# Patient Record
Sex: FEMALE | Race: OTHER | NOT HISPANIC OR LATINO | Employment: UNEMPLOYED | URBAN - METROPOLITAN AREA
[De-identification: names, ages, dates, MRNs, and addresses within clinical notes are randomized per-mention and may not be internally consistent; named-entity substitution may affect disease eponyms.]

---

## 2017-03-31 ENCOUNTER — HISTORICAL (OUTPATIENT)
Dept: INTERNAL MEDICINE | Facility: CLINIC | Age: 72
End: 2017-03-31

## 2017-04-18 ENCOUNTER — HISTORICAL (OUTPATIENT)
Dept: INTERNAL MEDICINE | Facility: CLINIC | Age: 72
End: 2017-04-18

## 2018-05-01 ENCOUNTER — HISTORICAL (OUTPATIENT)
Dept: INTERNAL MEDICINE | Facility: CLINIC | Age: 73
End: 2018-05-01

## 2018-09-27 ENCOUNTER — HISTORICAL (OUTPATIENT)
Dept: ADMINISTRATIVE | Facility: HOSPITAL | Age: 73
End: 2018-09-27

## 2019-06-28 ENCOUNTER — HISTORICAL (OUTPATIENT)
Dept: INTERNAL MEDICINE | Facility: CLINIC | Age: 74
End: 2019-06-28

## 2019-06-28 LAB
ABS NEUT (OLG): 5.13 X10(3)/MCL (ref 2.1–9.2)
ALBUMIN SERPL-MCNC: 3.7 GM/DL (ref 3.4–5)
ALBUMIN/GLOB SERPL: 1.1 RATIO (ref 1.1–2)
ALP SERPL-CCNC: 109 UNIT/L (ref 45–117)
ALT SERPL-CCNC: 16 UNIT/L (ref 12–78)
APPEARANCE, UA: CLEAR
AST SERPL-CCNC: 16 UNIT/L (ref 15–37)
BACTERIA #/AREA URNS AUTO: ABNORMAL /[HPF]
BASOPHILS # BLD AUTO: 0.06 X10(3)/MCL
BASOPHILS NFR BLD AUTO: 1 %
BILIRUB SERPL-MCNC: 0.7 MG/DL (ref 0.2–1)
BILIRUB UR QL STRIP: NEGATIVE
BILIRUBIN DIRECT+TOT PNL SERPL-MCNC: 0.2 MG/DL
BILIRUBIN DIRECT+TOT PNL SERPL-MCNC: 0.5 MG/DL
BUN SERPL-MCNC: 8 MG/DL (ref 7–18)
CALCIUM SERPL-MCNC: 9 MG/DL (ref 8.5–10.1)
CHLORIDE SERPL-SCNC: 98 MMOL/L (ref 98–107)
CHOLEST SERPL-MCNC: 161 MG/DL
CHOLEST/HDLC SERPL: 2.5 {RATIO} (ref 0–4.4)
CO2 SERPL-SCNC: 32 MMOL/L (ref 21–32)
COLOR UR: ABNORMAL
CREAT SERPL-MCNC: 0.7 MG/DL (ref 0.6–1.3)
EOSINOPHIL # BLD AUTO: 0.2 X10(3)/MCL
EOSINOPHIL NFR BLD AUTO: 2 %
ERYTHROCYTE [DISTWIDTH] IN BLOOD BY AUTOMATED COUNT: 12.2 % (ref 11.5–14.5)
EST. AVERAGE GLUCOSE BLD GHB EST-MCNC: 128 MG/DL
GLOBULIN SER-MCNC: 3.5 GM/ML (ref 2.3–3.5)
GLUCOSE (UA): NORMAL
GLUCOSE SERPL-MCNC: 107 MG/DL (ref 74–106)
HBA1C MFR BLD: 6.1 % (ref 4.2–6.3)
HCT VFR BLD AUTO: 42.1 % (ref 35–46)
HDLC SERPL-MCNC: 64 MG/DL
HGB BLD-MCNC: 14.1 GM/DL (ref 12–16)
HGB UR QL STRIP: NEGATIVE
HYALINE CASTS #/AREA URNS LPF: ABNORMAL /[LPF]
IMM GRANULOCYTES # BLD AUTO: 0.02 10*3/UL
IMM GRANULOCYTES NFR BLD AUTO: 0 %
KETONES UR QL STRIP: NEGATIVE
LDLC SERPL CALC-MCNC: 75 MG/DL (ref 0–130)
LEUKOCYTE ESTERASE UR QL STRIP: 500 LEU/UL
LYMPHOCYTES # BLD AUTO: 2.04 X10(3)/MCL
LYMPHOCYTES NFR BLD AUTO: 25 % (ref 13–40)
MCH RBC QN AUTO: 30.7 PG (ref 26–34)
MCHC RBC AUTO-ENTMCNC: 33.5 GM/DL (ref 31–37)
MCV RBC AUTO: 91.7 FL (ref 80–100)
MONOCYTES # BLD AUTO: 0.72 X10(3)/MCL
MONOCYTES NFR BLD AUTO: 9 % (ref 4–12)
NEUTROPHILS # BLD AUTO: 5.13 X10(3)/MCL
NEUTROPHILS NFR BLD AUTO: 63 X10(3)/MCL
NITRITE UR QL STRIP: NEGATIVE
PH UR STRIP: 7 [PH] (ref 4.5–8)
PLATELET # BLD AUTO: 343 X10(3)/MCL (ref 130–400)
PMV BLD AUTO: 9.2 FL (ref 7.4–10.4)
POTASSIUM SERPL-SCNC: 3.6 MMOL/L (ref 3.5–5.1)
PROT SERPL-MCNC: 7.2 GM/DL (ref 6.4–8.2)
PROT UR QL STRIP: NEGATIVE
RBC # BLD AUTO: 4.59 X10(6)/MCL (ref 4–5.2)
RBC #/AREA URNS AUTO: ABNORMAL /[HPF]
SODIUM SERPL-SCNC: 133 MMOL/L (ref 136–145)
SP GR UR STRIP: 1.01 (ref 1–1.03)
SQUAMOUS #/AREA URNS LPF: >100 /[LPF]
TRIGL SERPL-MCNC: 109 MG/DL
TSH SERPL-ACNC: 1.37 MIU/L (ref 0.36–3.74)
UROBILINOGEN UR STRIP-ACNC: NORMAL
VLDLC SERPL CALC-MCNC: 22 MG/DL
WBC # SPEC AUTO: 8.2 X10(3)/MCL (ref 4.5–11)
WBC #/AREA URNS AUTO: ABNORMAL /HPF

## 2019-07-23 ENCOUNTER — HISTORICAL (OUTPATIENT)
Dept: RADIOLOGY | Facility: HOSPITAL | Age: 74
End: 2019-07-23

## 2019-09-30 LAB
BILIRUB SERPL-MCNC: NEGATIVE MG/DL
BLOOD URINE, POC: NORMAL
CLARITY, POC UA: CLEAR
COLOR, POC UA: YELLOW
GLUCOSE UR QL STRIP: NEGATIVE
KETONES UR QL STRIP: NEGATIVE
LEUKOCYTE EST, POC UA: NEGATIVE
NITRITE, POC UA: NEGATIVE
PH, POC UA: 7
PROTEIN, POC: NORMAL
SPECIFIC GRAVITY, POC UA: 1
UROBILINOGEN, POC UA: NORMAL

## 2020-05-28 ENCOUNTER — HISTORICAL (OUTPATIENT)
Dept: LAB | Facility: HOSPITAL | Age: 75
End: 2020-05-28

## 2020-05-28 LAB
ABS NEUT (OLG): 3.9 X10(3)/MCL (ref 2.1–9.2)
APPEARANCE, UA: CLEAR
BACTERIA #/AREA URNS AUTO: ABNORMAL /HPF
BASOPHILS # BLD AUTO: 0.1 X10(3)/MCL (ref 0–0.2)
BASOPHILS NFR BLD AUTO: 1 %
BILIRUB UR QL STRIP: NEGATIVE
BUN SERPL-MCNC: 12 MG/DL (ref 7–18)
CALCIUM SERPL-MCNC: 9.1 MG/DL (ref 8.5–10.1)
CHLORIDE SERPL-SCNC: 98 MMOL/L (ref 98–107)
CHOLEST SERPL-MCNC: 174 MG/DL
CHOLEST/HDLC SERPL: 2.7 {RATIO} (ref 0–4.4)
CO2 SERPL-SCNC: 31 MMOL/L (ref 21–32)
COLOR UR: NORMAL
CREAT SERPL-MCNC: 0.8 MG/DL (ref 0.6–1.3)
CREAT/UREA NIT SERPL: 15
EOSINOPHIL # BLD AUTO: 0.3 X10(3)/MCL (ref 0–0.9)
EOSINOPHIL NFR BLD AUTO: 5 %
ERYTHROCYTE [DISTWIDTH] IN BLOOD BY AUTOMATED COUNT: 12.1 % (ref 11.5–14.5)
EST. AVERAGE GLUCOSE BLD GHB EST-MCNC: 108 MG/DL
GLUCOSE (UA): NEGATIVE
GLUCOSE SERPL-MCNC: 99 MG/DL (ref 74–106)
HBA1C MFR BLD: 5.4 % (ref 4.2–6.3)
HCT VFR BLD AUTO: 41 % (ref 35–46)
HDLC SERPL-MCNC: 64 MG/DL (ref 40–59)
HGB BLD-MCNC: 13.9 GM/DL (ref 12–16)
HGB UR QL STRIP: NEGATIVE
HYALINE CASTS #/AREA URNS LPF: ABNORMAL /LPF
IMM GRANULOCYTES # BLD AUTO: 0.03 10*3/UL
IMM GRANULOCYTES NFR BLD AUTO: 0 %
KETONES UR QL STRIP: NEGATIVE
LDLC SERPL CALC-MCNC: 95 MG/DL
LEUKOCYTE ESTERASE UR QL STRIP: NEGATIVE
LYMPHOCYTES # BLD AUTO: 2.2 X10(3)/MCL (ref 0.6–4.6)
LYMPHOCYTES NFR BLD AUTO: 31 %
MCH RBC QN AUTO: 30.5 PG (ref 26–34)
MCHC RBC AUTO-ENTMCNC: 33.9 GM/DL (ref 31–37)
MCV RBC AUTO: 89.9 FL (ref 80–100)
MONOCYTES # BLD AUTO: 0.7 X10(3)/MCL (ref 0.1–1.3)
MONOCYTES NFR BLD AUTO: 10 %
MUCOUS THREADS URNS QL MICRO: SLIGHT
NEUTROPHILS # BLD AUTO: 3.9 X10(3)/MCL (ref 2.1–9.2)
NEUTROPHILS NFR BLD AUTO: 54 %
NITRITE UR QL STRIP: NEGATIVE
PH UR STRIP: 7.5 [PH] (ref 4.5–8)
PLATELET # BLD AUTO: 324 X10(3)/MCL (ref 130–400)
PMV BLD AUTO: 9 FL (ref 7.4–10.4)
POTASSIUM SERPL-SCNC: 3.5 MMOL/L (ref 3.5–5.1)
PROT UR QL STRIP: NEGATIVE
RBC # BLD AUTO: 4.56 X10(6)/MCL (ref 4–5.2)
RBC #/AREA URNS AUTO: ABNORMAL /HPF
SODIUM SERPL-SCNC: 133 MMOL/L (ref 136–145)
SP GR UR STRIP: 1 (ref 1–1.03)
SQUAMOUS #/AREA URNS LPF: ABNORMAL /LPF
TRIGL SERPL-MCNC: 77 MG/DL
TSH SERPL-ACNC: 2.16 MIU/L (ref 0.36–3.74)
UROBILINOGEN UR STRIP-ACNC: NORMAL
VLDLC SERPL CALC-MCNC: 15 MG/DL
WBC # SPEC AUTO: 7.3 X10(3)/MCL (ref 4.5–11)
WBC #/AREA URNS AUTO: ABNORMAL /HPF

## 2020-07-27 ENCOUNTER — HISTORICAL (OUTPATIENT)
Dept: RADIOLOGY | Facility: HOSPITAL | Age: 75
End: 2020-07-27

## 2020-09-01 ENCOUNTER — HISTORICAL (OUTPATIENT)
Dept: INTERNAL MEDICINE | Facility: CLINIC | Age: 75
End: 2020-09-01

## 2020-09-01 LAB
ALBUMIN SERPL-MCNC: 3.6 GM/DL (ref 3.4–5)
ALBUMIN/GLOB SERPL: 1 RATIO (ref 1.1–2)
ALP SERPL-CCNC: 90 UNIT/L (ref 45–117)
ALT SERPL-CCNC: 17 UNIT/L (ref 12–78)
AST SERPL-CCNC: 20 UNIT/L (ref 15–37)
BILIRUB SERPL-MCNC: 0.4 MG/DL (ref 0.2–1)
BILIRUBIN DIRECT+TOT PNL SERPL-MCNC: 0.1 MG/DL (ref 0–0.2)
BILIRUBIN DIRECT+TOT PNL SERPL-MCNC: 0.3 MG/DL
BUN SERPL-MCNC: 12 MG/DL (ref 7–18)
CALCIUM SERPL-MCNC: 9 MG/DL (ref 8.5–10.1)
CHLORIDE SERPL-SCNC: 100 MMOL/L (ref 98–107)
CO2 SERPL-SCNC: 30 MMOL/L (ref 21–32)
CREAT SERPL-MCNC: 0.7 MG/DL (ref 0.6–1.3)
GLOBULIN SER-MCNC: 3.7 GM/ML (ref 2.3–3.5)
GLUCOSE SERPL-MCNC: 95 MG/DL (ref 74–106)
POTASSIUM SERPL-SCNC: 4.2 MMOL/L (ref 3.5–5.1)
PROT SERPL-MCNC: 7.3 GM/DL (ref 6.4–8.2)
SODIUM SERPL-SCNC: 136 MMOL/L (ref 136–145)

## 2020-09-24 ENCOUNTER — HISTORICAL (OUTPATIENT)
Dept: SURGERY | Facility: HOSPITAL | Age: 75
End: 2020-09-24

## 2020-12-22 ENCOUNTER — HISTORICAL (OUTPATIENT)
Dept: INTERNAL MEDICINE | Facility: CLINIC | Age: 75
End: 2020-12-22

## 2020-12-22 LAB
ABS NEUT (OLG): 2.95 X10(3)/MCL (ref 2.1–9.2)
ALBUMIN SERPL-MCNC: 4 GM/DL (ref 3.4–4.8)
ALBUMIN/GLOB SERPL: 1.2 RATIO (ref 1.1–2)
ALP SERPL-CCNC: 95 UNIT/L (ref 40–150)
ALT SERPL-CCNC: 14 UNIT/L (ref 0–55)
APPEARANCE, UA: ABNORMAL
AST SERPL-CCNC: 23 UNIT/L (ref 5–34)
BACTERIA #/AREA URNS AUTO: ABNORMAL /HPF
BASOPHILS # BLD AUTO: 0 X10(3)/MCL (ref 0–0.2)
BASOPHILS NFR BLD AUTO: 1 %
BILIRUB SERPL-MCNC: 0.6 MG/DL
BILIRUB UR QL STRIP: NEGATIVE
BILIRUBIN DIRECT+TOT PNL SERPL-MCNC: 0.3 MG/DL (ref 0–0.5)
BILIRUBIN DIRECT+TOT PNL SERPL-MCNC: 0.3 MG/DL (ref 0–0.8)
BUN SERPL-MCNC: 10 MG/DL (ref 9.8–20.1)
CALCIUM SERPL-MCNC: 9.2 MG/DL (ref 8.4–10.2)
CHLORIDE SERPL-SCNC: 96 MMOL/L (ref 98–107)
CHOLEST SERPL-MCNC: 180 MG/DL
CHOLEST/HDLC SERPL: 3 {RATIO} (ref 0–5)
CO2 SERPL-SCNC: 29 MMOL/L (ref 23–31)
COLOR UR: YELLOW
CREAT SERPL-MCNC: 0.73 MG/DL (ref 0.55–1.02)
EOSINOPHIL # BLD AUTO: 0.1 X10(3)/MCL (ref 0–0.9)
EOSINOPHIL NFR BLD AUTO: 2 %
ERYTHROCYTE [DISTWIDTH] IN BLOOD BY AUTOMATED COUNT: 12.1 % (ref 11.5–14.5)
EST. AVERAGE GLUCOSE BLD GHB EST-MCNC: 116.9 MG/DL
GLOBULIN SER-MCNC: 3.2 GM/DL (ref 2.4–3.5)
GLUCOSE (UA): NEGATIVE
GLUCOSE SERPL-MCNC: 140 MG/DL (ref 82–115)
HBA1C MFR BLD: 5.7 %
HCT VFR BLD AUTO: 40.2 % (ref 35–46)
HDLC SERPL-MCNC: 70 MG/DL (ref 35–60)
HGB BLD-MCNC: 13.2 GM/DL (ref 12–16)
HGB UR QL STRIP: NEGATIVE
HYALINE CASTS #/AREA URNS LPF: ABNORMAL /LPF
IMM GRANULOCYTES # BLD AUTO: 0.02 10*3/UL
IMM GRANULOCYTES NFR BLD AUTO: 0 %
KETONES UR QL STRIP: NEGATIVE
LDLC SERPL CALC-MCNC: 96 MG/DL (ref 50–140)
LEUKOCYTE ESTERASE UR QL STRIP: 25 LEU/UL
LYMPHOCYTES # BLD AUTO: 1.2 X10(3)/MCL (ref 0.6–4.6)
LYMPHOCYTES NFR BLD AUTO: 25 %
MCH RBC QN AUTO: 30.5 PG (ref 26–34)
MCHC RBC AUTO-ENTMCNC: 32.8 GM/DL (ref 31–37)
MCV RBC AUTO: 92.8 FL (ref 80–100)
MONOCYTES # BLD AUTO: 0.6 X10(3)/MCL (ref 0.1–1.3)
MONOCYTES NFR BLD AUTO: 12 %
NEUTROPHILS # BLD AUTO: 2.95 X10(3)/MCL (ref 2.1–9.2)
NEUTROPHILS NFR BLD AUTO: 59 %
NITRITE UR QL STRIP: NEGATIVE
PH UR STRIP: 6.5 [PH] (ref 4.5–8)
PLATELET # BLD AUTO: 338 X10(3)/MCL (ref 130–400)
PMV BLD AUTO: 9.4 FL (ref 7.4–10.4)
POTASSIUM SERPL-SCNC: 4.2 MMOL/L (ref 3.5–5.1)
PROT SERPL-MCNC: 7.2 GM/DL (ref 5.8–7.6)
PROT UR QL STRIP: NEGATIVE
RBC # BLD AUTO: 4.33 X10(6)/MCL (ref 4–5.2)
RBC #/AREA URNS AUTO: ABNORMAL /HPF
SODIUM SERPL-SCNC: 134 MMOL/L (ref 136–145)
SP GR UR STRIP: 1.01 (ref 1–1.03)
SQUAMOUS #/AREA URNS LPF: >100 /LPF
TRIGL SERPL-MCNC: 72 MG/DL (ref 37–140)
UROBILINOGEN UR STRIP-ACNC: NORMAL
VLDLC SERPL CALC-MCNC: 14 MG/DL
WBC # SPEC AUTO: 5 X10(3)/MCL (ref 4.5–11)
WBC #/AREA URNS AUTO: ABNORMAL /HPF

## 2020-12-24 LAB — FINAL CULTURE: NORMAL

## 2021-02-02 ENCOUNTER — HISTORICAL (OUTPATIENT)
Dept: ADMINISTRATIVE | Facility: HOSPITAL | Age: 76
End: 2021-02-02

## 2021-07-20 ENCOUNTER — HISTORICAL (OUTPATIENT)
Dept: INTERNAL MEDICINE | Facility: CLINIC | Age: 76
End: 2021-07-20

## 2021-07-20 LAB
ABS NEUT (OLG): 4.63 X10(3)/MCL (ref 2.1–9.2)
ALBUMIN SERPL-MCNC: 4 GM/DL (ref 3.4–4.8)
ALBUMIN/GLOB SERPL: 1.4 RATIO (ref 1.1–2)
ALP SERPL-CCNC: 78 UNIT/L (ref 40–150)
ALT SERPL-CCNC: 11 UNIT/L (ref 0–55)
AST SERPL-CCNC: 17 UNIT/L (ref 5–34)
BASOPHILS # BLD AUTO: 0 X10(3)/MCL (ref 0–0.2)
BASOPHILS NFR BLD AUTO: 0 %
BILIRUB SERPL-MCNC: 0.8 MG/DL
BILIRUBIN DIRECT+TOT PNL SERPL-MCNC: 0.3 MG/DL (ref 0–0.5)
BILIRUBIN DIRECT+TOT PNL SERPL-MCNC: 0.5 MG/DL (ref 0–0.8)
BUN SERPL-MCNC: 13.6 MG/DL (ref 9.8–20.1)
CALCIUM SERPL-MCNC: 9.7 MG/DL (ref 8.4–10.2)
CHLORIDE SERPL-SCNC: 99 MMOL/L (ref 98–107)
CHOLEST SERPL-MCNC: 183 MG/DL
CHOLEST/HDLC SERPL: 3 {RATIO} (ref 0–5)
CO2 SERPL-SCNC: 32 MMOL/L (ref 23–31)
CREAT SERPL-MCNC: 0.83 MG/DL (ref 0.55–1.02)
EOSINOPHIL # BLD AUTO: 0.2 X10(3)/MCL (ref 0–0.9)
EOSINOPHIL NFR BLD AUTO: 3 %
ERYTHROCYTE [DISTWIDTH] IN BLOOD BY AUTOMATED COUNT: 12.4 % (ref 11.5–14.5)
EST. AVERAGE GLUCOSE BLD GHB EST-MCNC: 111.2 MG/DL
GLOBULIN SER-MCNC: 2.9 GM/DL (ref 2.4–3.5)
GLUCOSE SERPL-MCNC: 101 MG/DL (ref 82–115)
HBA1C MFR BLD: 5.5 %
HCT VFR BLD AUTO: 41.6 % (ref 35–46)
HDLC SERPL-MCNC: 55 MG/DL (ref 35–60)
HGB BLD-MCNC: 13.9 GM/DL (ref 12–16)
IMM GRANULOCYTES # BLD AUTO: 0.02 10*3/UL
IMM GRANULOCYTES NFR BLD AUTO: 0 %
LDLC SERPL CALC-MCNC: 112 MG/DL (ref 50–140)
LYMPHOCYTES # BLD AUTO: 2 X10(3)/MCL (ref 0.6–4.6)
LYMPHOCYTES NFR BLD AUTO: 27 %
MCH RBC QN AUTO: 30.7 PG (ref 26–34)
MCHC RBC AUTO-ENTMCNC: 33.4 GM/DL (ref 31–37)
MCV RBC AUTO: 91.8 FL (ref 80–100)
MONOCYTES # BLD AUTO: 0.5 X10(3)/MCL (ref 0.1–1.3)
MONOCYTES NFR BLD AUTO: 7 %
NEUTROPHILS # BLD AUTO: 4.63 X10(3)/MCL (ref 2.1–9.2)
NEUTROPHILS NFR BLD AUTO: 62 %
NRBC BLD AUTO-RTO: 0 % (ref 0–0.2)
PLATELET # BLD AUTO: 292 X10(3)/MCL (ref 130–400)
PMV BLD AUTO: 10 FL (ref 7.4–10.4)
POTASSIUM SERPL-SCNC: 3.9 MMOL/L (ref 3.5–5.1)
PROT SERPL-MCNC: 6.9 GM/DL (ref 5.8–7.6)
RBC # BLD AUTO: 4.53 X10(6)/MCL (ref 4–5.2)
SODIUM SERPL-SCNC: 136 MMOL/L (ref 136–145)
TRIGL SERPL-MCNC: 79 MG/DL (ref 37–140)
TSH SERPL-ACNC: 1.44 UIU/ML (ref 0.35–4.94)
VLDLC SERPL CALC-MCNC: 16 MG/DL
WBC # SPEC AUTO: 7.4 X10(3)/MCL (ref 4.5–11)

## 2021-11-22 ENCOUNTER — HISTORICAL (OUTPATIENT)
Dept: INTERNAL MEDICINE | Facility: CLINIC | Age: 76
End: 2021-11-22

## 2021-11-22 LAB
DEPRECATED CALCIDIOL+CALCIFEROL SERPL-MC: 47.1 NG/ML (ref 30–80)
VIT B12 SERPL-MCNC: 852 PG/ML (ref 213–816)

## 2021-11-29 ENCOUNTER — HISTORICAL (OUTPATIENT)
Dept: ADMINISTRATIVE | Facility: HOSPITAL | Age: 76
End: 2021-11-29

## 2021-11-29 LAB
ABS NEUT (OLG): 6.65 X10(3)/MCL (ref 2.1–9.2)
ALBUMIN SERPL-MCNC: 4.4 GM/DL (ref 3.4–4.8)
ALBUMIN/GLOB SERPL: 1.2 RATIO (ref 1.1–2)
ALP SERPL-CCNC: 86 UNIT/L (ref 40–150)
ALT SERPL-CCNC: 15 UNIT/L (ref 0–55)
APTT PPP: 31 SECOND(S) (ref 23.3–37)
AST SERPL-CCNC: 29 UNIT/L (ref 5–34)
BASOPHILS # BLD AUTO: 0.1 X10(3)/MCL (ref 0–0.2)
BASOPHILS NFR BLD AUTO: 1 %
BILIRUB SERPL-MCNC: 0.7 MG/DL
BILIRUBIN DIRECT+TOT PNL SERPL-MCNC: 0.2 MG/DL (ref 0–0.5)
BILIRUBIN DIRECT+TOT PNL SERPL-MCNC: 0.5 MG/DL (ref 0–0.8)
BUN SERPL-MCNC: 13.8 MG/DL (ref 9.8–20.1)
CALCIUM SERPL-MCNC: 10.4 MG/DL (ref 8.7–10.5)
CHLORIDE SERPL-SCNC: 94 MMOL/L (ref 98–107)
CO2 SERPL-SCNC: 26 MMOL/L (ref 23–31)
CREAT SERPL-MCNC: 0.68 MG/DL (ref 0.55–1.02)
EOSINOPHIL # BLD AUTO: 0.1 X10(3)/MCL (ref 0–0.9)
EOSINOPHIL NFR BLD AUTO: 1 %
ERYTHROCYTE [DISTWIDTH] IN BLOOD BY AUTOMATED COUNT: 12.2 % (ref 11.5–14.5)
GLOBULIN SER-MCNC: 3.7 GM/DL (ref 2.4–3.5)
GLUCOSE SERPL-MCNC: 88 MG/DL (ref 82–115)
HCT VFR BLD AUTO: 43.5 % (ref 35–46)
HGB BLD-MCNC: 14.6 GM/DL (ref 12–16)
IMM GRANULOCYTES # BLD AUTO: 0.03 10*3/UL
IMM GRANULOCYTES NFR BLD AUTO: 0 %
INR PPP: 0.92 (ref 0.9–1.2)
LYMPHOCYTES # BLD AUTO: 2.2 X10(3)/MCL (ref 0.6–4.6)
LYMPHOCYTES NFR BLD AUTO: 23 %
MCH RBC QN AUTO: 30.7 PG (ref 26–34)
MCHC RBC AUTO-ENTMCNC: 33.6 GM/DL (ref 31–37)
MCV RBC AUTO: 91.6 FL (ref 80–100)
MONOCYTES # BLD AUTO: 0.5 X10(3)/MCL (ref 0.1–1.3)
MONOCYTES NFR BLD AUTO: 5 %
NEUTROPHILS # BLD AUTO: 6.65 X10(3)/MCL (ref 2.1–9.2)
NEUTROPHILS NFR BLD AUTO: 70 %
NRBC BLD AUTO-RTO: 0 % (ref 0–0.2)
PLATELET # BLD AUTO: 349 X10(3)/MCL (ref 130–400)
PMV BLD AUTO: 11 FL (ref 7.4–10.4)
POTASSIUM SERPL-SCNC: 4.7 MMOL/L (ref 3.5–5.1)
PROT SERPL-MCNC: 8.1 GM/DL (ref 5.8–7.6)
PROTHROMBIN TIME: 12.2 SECOND(S) (ref 11.9–14.4)
RBC # BLD AUTO: 4.75 X10(6)/MCL (ref 4–5.2)
SODIUM SERPL-SCNC: 131 MMOL/L (ref 136–145)
WBC # SPEC AUTO: 9.5 X10(3)/MCL (ref 4.5–11)

## 2022-02-17 ENCOUNTER — HISTORICAL (OUTPATIENT)
Dept: ADMINISTRATIVE | Facility: HOSPITAL | Age: 77
End: 2022-02-17

## 2022-04-10 ENCOUNTER — HISTORICAL (OUTPATIENT)
Dept: ADMINISTRATIVE | Facility: HOSPITAL | Age: 77
End: 2022-04-10
Payer: MEDICARE

## 2022-04-30 VITALS
OXYGEN SATURATION: 99 % | WEIGHT: 129.44 LBS | SYSTOLIC BLOOD PRESSURE: 140 MMHG | SYSTOLIC BLOOD PRESSURE: 137 MMHG | HEIGHT: 61 IN | DIASTOLIC BLOOD PRESSURE: 87 MMHG | HEIGHT: 63 IN | BODY MASS INDEX: 24.44 KG/M2 | WEIGHT: 136.25 LBS | DIASTOLIC BLOOD PRESSURE: 68 MMHG | BODY MASS INDEX: 24.14 KG/M2

## 2022-04-30 NOTE — H&P
Patient:   Kika Farias             MRN: [id number]            FIN: [id number]               Age:   74 years     Sex:  Female     :  1945   Associated Diagnoses:   None   Author:   Maria E Lu MD      HPI: PT here for cataract surgery of the left eye. Denies changes in health or medications since the patient was last seen in clinic.    ROS: Negative x 10      SLE: Visually significant cataract of the left eye           General NAP  HENT atraumatic  Eyes: cataract OS  Neck: nontender, no masses or thyromegaly  Respiratory: no distress on room air  Cardiovascular: regular rate  Gastrointestinal: no hepatomegaly   Lymphatics: no lymphadenopathy  Musculoskeletal: wnl      Assessment and plan   1.  Visually significant cataract of the left eye   - Covid testing verified: Negative  - Allergies: Verified  - Surgical site marked  -Consent: Verified  - Questions answered  - Ok to proceed with surgery     Maria E Lu MD  [employer] [facility] PGY-4

## 2022-04-30 NOTE — OP NOTE
Patient:   Kika Farias             MRN: [id number]            FIN: [id number]               Age:   74 years     Sex:  Female     :  1945   Associated Diagnoses:   None   Author:   Maria E Lu MD      OPERATIVE NOTE -- OPHTHALMOLOGY SERVICE    DATE: 2020    SURGEON: Maria E Lu MD    ATTENDING: IGNACIA Vargas MD    PRE-OPERATIVE DIAGNOSIS: Visually significant cataract, LEFT eye    POST-OPERATIVE DIAGNOSIS: Visually significant cataract, LEFT eye    PROCEDURES: Phacoemulsification with intraocular lens, LEFT  eye    IMPLANT: [id number] +28.00 SN [id number]    ANESTHESIA: MAC with topical lidocaine    COMPLICATIONS: None    ESTIMATED BLOOD LOSS: < 5 cc    INDICATION:    The patient has a history of painless progressive visual loss and difficulty with activities of daily living secondary to cataract formation. After a thorough discussion of the risks, benefits and alternatives to cataract surgery, including, but not limited to, the rare risks of infection, retinal detachment, need for additional surgery, loss of vision and even loss of the eye, the patient voices good understanding and desires to proceed. Written informed consent was confirmed and witnessed prior to surgery.    PROCEDURE IN DETAIL:    TheThe patients IOL calculations and lens selection were reviewed and confirmed. After verification and marking of the proper eye in the preop holding area, several sets of 1% tropicamide, 2.5% phenylephrine, and 1% cyclopentolate drops were then placed.    The patient was brought to the operating room in supine position where the eye was prepped and draped in standard sterile fashion using 5% betadine. A lid speculum placed. Topical tetracaine and preservative free lidocaine  was used in addition to the preoperative anesthesia. A paracentesis incision was created through which preservative free lidocaine was injected into the eye. At this time, viscoelastic was used to fill the anterior chamber  through the paracentesis wound. A 2.4mm keratome blade was used to create a temporal clear corneal incision. A cystotome and Utrata forceps was then used to fashion a continuous curvilinear capsulorrhexis. Hydrodissection with BSS was performed using a hydrodissection cannula. Phacoemulsification of the nucleus was carried out with a divide and conquer technique, and all remaining epinuclear and cortical material was removed. The eye was reformed using viscoelastic and the above listed intraocular lens was implanted into the capsular bag. At the time of lens insertion a small rent in one of the haptics was observed. The lens was manipulated and set in a central position. All remaining viscoelastic was removed from the eye.  The lens was investigated and both haptics were found to be in position and lens was found to be centered. All wounds were hydrated  watertight at the end of the case.    Drops of Vigamox and Pred Forte.were instilled and an eye shield placed over the eye. The patient tolerated the procedure well and was taken to the recovery area in stable condition. The patient was instructed to follow-up for routine post-operative care the following morning.    The attending surgeon was present, supervising, and assisting as necessary during the entire surgery.

## 2022-05-02 NOTE — HISTORICAL [facility] CERNER
This is a historical note converted from Siria. Formatting and pictures may have been removed.  Please reference Siria for original formatting and attached multimedia. Chief Complaint  Pt. reports same pain in her right arm.  History of Present Illness  72 year old female here today for F/U. pt previous pt D Manuel. Pt has HTN at goal. She reports right arm pain, she states she had a fracture 1 year ago and area still hurts with mild pain.  Review of Systems  All Systems Negative Except: See HPI  Physical Exam  Vitals & Measurements  T:?36.7? ?C (Oral)? HR:?67(Peripheral)? RR:?18? BP:?137/87?  HT:?160?cm? HT:?160?cm? WT:?61.8?kg? WT:?61.8?kg? BMI:?24.14?  General:? Alert and oriented, No acute distress, General health good  Eye:? Pupils are equal, round and reactive to light, Normal conjunctiva.?  HENT:? Normocephalic, Normal hearing, Oral mucosa is moist, No pharyngeal erythema.?  Neck:? Supple, Non-tender, No carotid bruit, No jugular venous distention, No lymphadenopathy, No thyromegaly.?  Respiratory:? Lungs are clear to auscultation, Respirations are non-labored, Breath sounds are equal, Symmetrical chest wall expansion.?  Cardiovascular:? Normal rate, Regular rhythm, No murmur, Good pulses equal in all extremities, Normal peripheral perfusion, No edema.?  Gastrointestinal:? Soft, Non-tender, Non-distended, Normal bowel sounds, No organomegaly.?  Musculoskeletal: Normal range of motion, Normal strength, No tenderness, No deformity, Normal gait.?  Integumentary:? Warm, Dry.?  Neurologic:? Alert, Oriented.?  Psychiatric:? Cooperative, Appropriate mood & affect.?  ?  ?  ?  Assessment/Plan  Hypertension  At? goal  Low sodium diet (Dash Diet)  Continue Medications as prescribed  Monitor Blood daily, report any consistent numbers greater than 140/90  Maintain healthy weight  Ordered:  Internal Referral to Internal Medicine, Refer to [facility] to establish PCP, *Est. 10/27/18 3:00:00 CDT, Future Visit?,  Hypertension  ?  Right arm pain  ?X-ray today  Ordered:  XR Forearm Right 2 Views, Routine, *Est. 09/27/18 3:00:00 CDT, Pain, None, Ambulatory, Rad Type, Order for future visit, Right arm pain, Not Scheduled, *Est. 09/27/18 3:00:00 CDT  ?  Orders:  amLODIPine, 5 mg = 1 tab(s), Oral, Daily, # 90 tab(s), 1 Refill(s), Pharmacy: [id number]  hydrochlorothiazide-losartan, 1 tab(s), Oral, Daily, # 90 tab(s), 1 Refill(s), Pharmacy: [id number]  ?  ?  Pt only speaks Italian enterputer used [id number] [id number]   Problem List/Past Medical History  Ongoing  Alteration in tissue perfusion  HTN (hypertension)  Hyperlipidemia  Hyperlipidemia  Hypertension  Hypertension  Onychomycosis  Osteoarthropathy  Visit for screening mammogram  Well adult exam  Historical  No qualifying data  Procedure/Surgical History  Cataract Extraction Phacoemulsification (Right) (10/13/2015)  Extracapsular cataract removal with insertion of intraocular lens prosthesis (1 stage procedure), manual or mechanical technique (eg, irrigation and aspiration or phacoemulsification) (10/13/2015)  Replacement of Right Lens with Synthetic Substitute, Percutaneous Approach (10/13/2015)  Procedure Cancelled Intraoperatively (Right) (10/08/2015)  Biopsy Gastrointestional (06/18/2014)  Closed [endoscopic] biopsy of large intestine (06/18/2014)  Colonoscopy (06/18/2014)  Colonoscopy, flexible, proximal to splenic flexure; with biopsy, single or multiple. (06/18/2014)  Tubal ligation (1992)   Medications  Artificial Tears, Eye-Both  aspirin 81 mg oral tablet, 81 mg= 1 tab(s), Oral, Daily,? ?Not taking  Calcium 600+D, 2 tab(s), Oral, Daily,? ?Not taking  COLLAGEN FOR HAIR,SKIN & NAILS, See Instructions  glucosamine,? ?Not taking  hydrochlorothiazide-losartan 12.5 mg-100 mg oral tablet, See Instructions, 6 refills  Mobic 7.5 mg oral tablet, 7.5 mg= 1 tab(s), Oral, Daily, PRN,? ?Not taking  Norvasc 5 mg oral tablet, 5 mg= 1 tab(s), Oral, Daily, 3 refills  One  A Day Women 50 Plus oral tablet, See Instructions  Silvadene, 1 sheldon, TOP, Once-NOW  Silvadene 1% topical cream, 1 sheldon, TOP, BID, 1 refills,? ?Not taking  simvastatin 10 mg oral tablet, 10 mg= 1 tab(s), Oral, Once a day (at bedtime), 3 refills  Allergies  No Known Allergies  Social History  Alcohol - Denies Alcohol Use, 06/18/2014  Never, 08/11/2015  Employment/School  Employed, Work/School description: . Activity level: Desk/Office. Operates hazardous equipment: No., 03/31/2016  Exercise  Self assessment: Fair condition., 03/31/2016  Home/Environment  Lives with Spouse. Living situation: Home/Independent. Alcohol abuse in household: No. Substance abuse in household: No. Smoker in household: No. Injuries/Abuse/Neglect in household: No. Feels unsafe at home: No. Family/Friends available for support: Yes. Concern for family members at home: No. Major illness in household: No. Financial concerns: No. TV/Computer concerns: No., 03/31/2016  Nutrition/Health  Regular, Wants to lose weight: No., 03/31/2016  Sexual  Substance Abuse - Denies Substance Abuse, 06/18/2014  Never, 08/11/2015  Tobacco - Denies Tobacco Use, 06/18/2014  Never smoker, N/A, 09/27/2018  Family History  Cardiac arrest.: Father.  Hypertension.: Mother and Father.  Stroke: Mother.  Immunizations  Vaccine Date Status   influenza virus vaccine, inactivated 09/27/2017 Given   Health Maintenance  Health Maintenance  ???Pending?(in the next year)  ??? ??OverDue  ??? ? ? ?Pneumococcal Vaccine due??10/09/15??and every 1??day(s)  ??? ? ? ?Aspirin Therapy for CVD Prevention due??07/27/16??and every 1??year(s)  ??? ? ? ?Geriatric Depression Screening due??09/29/17??and every 1??year(s)  ??? ? ? ?Advance Directive due??03/29/18??and every 1??year(s)  ??? ? ? ?Bone Density Screening due??04/08/18??and every 2??year(s)  ??? ??Due?  ??? ? ? ?ADL Screening due??09/27/18??and every 1??year(s)  ??? ? ? ?Alcohol Misuse Screening due??09/27/18??and  every 1??year(s)  ??? ? ? ?Cognitive Screening due??09/27/18??and every 1??year(s)  ??? ? ? ?Functional Assessment due??09/27/18??and every 1??year(s)  ??? ? ? ?Hypertension Management-Education due??09/27/18??and every 1??year(s)  ??? ? ? ?Tetanus Vaccine due??09/27/18??and every 10??year(s)  ??? ? ? ?Zoster Vaccine due??09/27/18??and every 100??year(s)  ??? ??Due In Future?  ??? ? ? ?Hypertension Management-BMP not due until??05/27/19??and every 1??year(s)  ??? ? ? ?Smoking Cessation not due until??06/26/19??and every 1??year(s)  ???Satisfied?(in the past 1 year)  ??? ??Satisfied?  ??? ? ? ?Blood Pressure Screening on??09/27/18.??Satisfied by Emery Conti LPN F.  ??? ? ? ?Body Mass Index Check on??09/27/18.??Satisfied by Emery Conti LPN F.  ??? ? ? ?Breast Cancer Screening ([facility]) on??05/01/18.??Satisfied by Lashell Love  ??? ? ? ?Depression Screening on??09/27/18.??Satisfied by Emery Conti LPN F.  ??? ? ? ?Diabetes Screening on??05/27/18.??Satisfied by Tegan Farley  ??? ? ? ?Fall Risk Assessment on??09/27/18.??Satisfied by Emery Conti LPN F.  ??? ? ? ?Hypertension Management-Blood Pressure on??09/27/18.??Satisfied by Emery Conti LPN F.  ??? ? ? ?Influenza Vaccine on??09/27/18.??Satisfied by Emery Conti LPN F.  ??? ? ? ?Lipid Screening on??03/27/18.??Satisfied by Julio César Calhounothy Christin  ??? ? ? ?Obesity Screening on??09/27/18.??Satisfied by Emery Conti LPN  ??? ? ? ?Smoking Cessation on??06/26/18.??Satisfied by Eulogio ASHBY, Lea Madrid  ?  ?

## 2022-07-11 ENCOUNTER — OFFICE VISIT (OUTPATIENT)
Dept: GYNECOLOGY | Facility: CLINIC | Age: 77
End: 2022-07-11
Payer: MEDICARE

## 2022-07-11 VITALS
BODY MASS INDEX: 26.23 KG/M2 | WEIGHT: 137.13 LBS | SYSTOLIC BLOOD PRESSURE: 130 MMHG | DIASTOLIC BLOOD PRESSURE: 69 MMHG | RESPIRATION RATE: 20 BRPM | TEMPERATURE: 98 F | HEART RATE: 64 BPM | OXYGEN SATURATION: 100 %

## 2022-07-11 DIAGNOSIS — N81.9 FEMALE GENITAL PROLAPSE, UNSPECIFIED TYPE: Primary | ICD-10-CM

## 2022-07-11 DIAGNOSIS — Z46.89 PESSARY MAINTENANCE: ICD-10-CM

## 2022-07-11 PROCEDURE — 99214 OFFICE O/P EST MOD 30 MIN: CPT | Mod: PBBFAC

## 2022-07-11 RX ORDER — AMLODIPINE BESYLATE 5 MG/1
5 TABLET ORAL DAILY
COMMUNITY
Start: 2022-06-24 | End: 2022-10-21 | Stop reason: SDUPTHER

## 2022-07-11 RX ORDER — ALENDRONATE SODIUM 70 MG/1
70 TABLET ORAL
COMMUNITY
Start: 2022-07-03 | End: 2023-03-21 | Stop reason: SDUPTHER

## 2022-07-11 RX ORDER — CONJUGATED ESTROGENS 0.62 MG/G
1 CREAM VAGINAL DAILY
Qty: 30 G | Refills: 6 | Status: SHIPPED | OUTPATIENT
Start: 2022-07-11 | End: 2023-03-13 | Stop reason: SDUPTHER

## 2022-07-11 RX ORDER — LOSARTAN POTASSIUM 100 MG/1
100 TABLET ORAL DAILY
COMMUNITY
Start: 2022-05-31 | End: 2022-10-26 | Stop reason: SDUPTHER

## 2022-07-11 RX ORDER — CONJUGATED ESTROGENS 0.62 MG/G
CREAM VAGINAL
COMMUNITY
Start: 2022-03-07 | End: 2022-07-11 | Stop reason: SDUPTHER

## 2022-07-11 RX ORDER — HYDROCHLOROTHIAZIDE 12.5 MG/1
12.5 TABLET ORAL DAILY
COMMUNITY
Start: 2022-05-31 | End: 2022-10-26 | Stop reason: SDUPTHER

## 2022-07-11 RX ORDER — OMEPRAZOLE 20 MG/1
20 CAPSULE, DELAYED RELEASE ORAL DAILY
COMMUNITY
Start: 2022-05-31 | End: 2023-03-01

## 2022-07-11 RX ORDER — IBUPROFEN 800 MG/1
800 TABLET ORAL EVERY 8 HOURS
COMMUNITY
Start: 2022-02-17

## 2022-07-11 RX ORDER — SIMVASTATIN 10 MG/1
10 TABLET, FILM COATED ORAL NIGHTLY
COMMUNITY
Start: 2022-04-18 | End: 2022-10-21 | Stop reason: SDUPTHER

## 2022-07-11 NOTE — PROGRESS NOTES
[facility] GYNECOLOGY CLINIC NOTE     Kika Farias is a 76 y.o.  with Stage III Ba prolapse managed with a #2 3/4 Gellhorn pessary. Patient is satisfied with the pessary and having no issues. She voids comfortable at home, denies incomplete emptying or symptoms of bladder outlet obstruction. Denies JAYSON. Denies vaginal bleeding, discharge, foul odor. She is compliant with Vaginal estrogen.    Gynecology  OB History        4    Para   3    Term   3            AB   1    Living   3       SAB        IAB        Ectopic        Multiple        Live Births                    Past Medical History:   Diagnosis Date    Hyperlipidemia     Hypertension       History reviewed. No pertinent surgical history.   Current Outpatient Medications   Medication Instructions    alendronate (FOSAMAX) 70 mg, Oral, Every 7 days    amLODIPine (NORVASC) 5 mg, Oral, Daily    hydroCHLOROthiazide (HYDRODIURIL) 12.5 mg, Oral, Daily    ibuprofen (ADVIL,MOTRIN) 800 mg, Oral, Every 8 hours    losartan (COZAAR) 100 mg, Oral, Daily    omeprazole (PRILOSEC) 20 mg, Oral, Daily    PREMARIN 1 g, Vaginal, Daily    simvastatin (ZOCOR) 10 mg, Oral, Nightly     Social History     Tobacco Use    Smoking status: Never Smoker    Smokeless tobacco: Never Used   Substance Use Topics    Alcohol use: Not Currently    Drug use: Never       Review of Systems  Pertinent items are noted in HPI.     Objective:     /69 (BP Location: Right arm, Patient Position: Sitting, BP Method: Medium (Automatic))   Pulse 64   Temp 98.4 °F (36.9 °C) (Oral)   Resp 20   Wt 62.2 kg (137 lb 2 oz)   SpO2 100%   BMI 26.23 kg/m²   Physical Exam:  Gen: Well-nourished, well-developed female appearing stated age. Alert, cooperative, in no acute distress.  Abdomen: Soft, non-tender, no masses.  Extrem: Extremities normal, atraumatic, non-tender calves.  Pelvic: Normal female genitalia without lesion, discharge or tenderness. Pessary removed and cleaned.  Speculum exam showed normal vaginal with no erosions or ulcerations. Pessary replaced without problems.  Note: RN chaperone present for entirety of exam.      Assessment:       76 y.o.  here for pessary check/cleaning. Doing well without issues, pessary removed, cleaned, and removed without issue.    1. Female genital prolapse, unspecified type  PREMARIN vaginal cream   2. Pessary maintenance  PREMARIN vaginal cream          Plan:       Problem List Items Addressed This Visit    None     Visit Diagnoses     Female genital prolapse, unspecified type    -  Primary    Relevant Medications    PREMARIN vaginal cream    Pessary maintenance        Relevant Medications    PREMARIN vaginal cream           Return to clinic in 6 months    Discussed patient and plan with Dr. Provost Irais Courtney MD, PGY-3  [facility]  2022 10:50 AM

## 2022-09-21 ENCOUNTER — HISTORICAL (OUTPATIENT)
Dept: ADMINISTRATIVE | Facility: HOSPITAL | Age: 77
End: 2022-09-21
Payer: MEDICARE

## 2022-09-30 ENCOUNTER — OFFICE VISIT (OUTPATIENT)
Dept: OPHTHALMOLOGY | Facility: CLINIC | Age: 77
End: 2022-09-30
Payer: MEDICARE

## 2022-09-30 VITALS — WEIGHT: 138 LBS | BODY MASS INDEX: 27.09 KG/M2 | HEIGHT: 60 IN

## 2022-09-30 DIAGNOSIS — Z96.1 PSEUDOPHAKIA OF BOTH EYES: ICD-10-CM

## 2022-09-30 DIAGNOSIS — H35.3131 EARLY DRY STAGE NONEXUDATIVE AGE-RELATED MACULAR DEGENERATION OF BOTH EYES: Primary | ICD-10-CM

## 2022-09-30 DIAGNOSIS — H04.123 DRY EYE SYNDROME OF BOTH EYES: ICD-10-CM

## 2022-09-30 PROCEDURE — 99213 OFFICE O/P EST LOW 20 MIN: CPT | Mod: PBBFAC,PO | Performed by: STUDENT IN AN ORGANIZED HEALTH CARE EDUCATION/TRAINING PROGRAM

## 2022-09-30 PROCEDURE — 92134 CPTRZ OPH DX IMG PST SGM RTA: CPT | Mod: PBBFAC,PO | Performed by: OPHTHALMOLOGY

## 2022-09-30 PROCEDURE — 92134 CPTRZ OPH DX IMG PST SGM RTA: CPT | Mod: PBBFAC,PO | Performed by: STUDENT IN AN ORGANIZED HEALTH CARE EDUCATION/TRAINING PROGRAM

## 2022-09-30 NOTE — PROGRESS NOTES
HPI     Macular Degeneration     Additional comments: OCT MAC, DFE           Dry Eye     Additional comments: Using AFT OU once a day          Last edited by Emily Thompson MA on 9/30/2022  8:38 AM.        OCT mac 9/30/2022  OD:218 Good foveal contour with few drusen is os intact  OS: 202 Good foveal contour with few drusen; is os disruption at fovea    Assessment /Plan     For exam results, see Encounter Report.    Early dry stage nonexudative age-related macular degeneration of both eyes    Pseudophakia of both eyes    Dry eye syndrome of both eyes      PRIOR TESTING  OCT mac 10/2021  OD:213 Good foveal contour with few drusen is os intact  OS: 206 Good foveal contour with few drusen; is os disruption at fovea    MRX 3/2021  -0.5 +2.50 x 6  -2.25 +3.25 x175  Add: +250    imer 3/30/21  OD: Flat SimK 40.99, steep simK 41.89, astig 1.8D at 6  OS; Flat SimK 40.22, Steep simK 42.44, astig 2.23D at 165     Assessment/Plan   1) PSK, OU  - most recently s/p CE/IOL OS (9/24/2020)  - Likely residual astigmatism ( Patient decided to get basic lens instead of toric lens offered )  - difficult MRx previously, so imer obtained 3/2021. With residual astigmatism OU  -Patient happy with current glasses and declines new refraction  - Pt w trace PCO, likely not affecting vision. monitor    2. KIKI 2/2 blephritis OU and lower eyleid laxity OU  -artificial tears 4-6 x/daily, WC, LS  - start nighttime denise    3. ARMD OU, mild  - Amsler grid daily  - No smoking, good diet and exercise encouraged  - OCT mac stable with no IRF/SRF  - annual DFE     4. Blepharoptosis OU  - not interested in surgery at this time, consider ptosis field in future    RTC 6 months OCT mac

## 2022-10-26 ENCOUNTER — OFFICE VISIT (OUTPATIENT)
Dept: INTERNAL MEDICINE | Facility: CLINIC | Age: 77
End: 2022-10-26
Payer: MEDICARE

## 2022-10-26 VITALS
TEMPERATURE: 98 F | RESPIRATION RATE: 18 BRPM | BODY MASS INDEX: 26.53 KG/M2 | DIASTOLIC BLOOD PRESSURE: 74 MMHG | HEIGHT: 60 IN | WEIGHT: 135.13 LBS | SYSTOLIC BLOOD PRESSURE: 137 MMHG | HEART RATE: 76 BPM

## 2022-10-26 DIAGNOSIS — E78.5 HYPERLIPIDEMIA, UNSPECIFIED HYPERLIPIDEMIA TYPE: ICD-10-CM

## 2022-10-26 DIAGNOSIS — N81.9 FEMALE GENITAL PROLAPSE, UNSPECIFIED TYPE: ICD-10-CM

## 2022-10-26 DIAGNOSIS — Z23 NEED FOR VACCINATION: Primary | ICD-10-CM

## 2022-10-26 DIAGNOSIS — H35.3131 EARLY DRY STAGE NONEXUDATIVE AGE-RELATED MACULAR DEGENERATION OF BOTH EYES: ICD-10-CM

## 2022-10-26 DIAGNOSIS — M81.0 AGE-RELATED OSTEOPOROSIS WITHOUT CURRENT PATHOLOGICAL FRACTURE: ICD-10-CM

## 2022-10-26 DIAGNOSIS — I10 HYPERTENSION, UNSPECIFIED TYPE: ICD-10-CM

## 2022-10-26 DIAGNOSIS — I10 PRIMARY HYPERTENSION: ICD-10-CM

## 2022-10-26 DIAGNOSIS — E78.2 MIXED HYPERLIPIDEMIA: ICD-10-CM

## 2022-10-26 DIAGNOSIS — Z12.11 ENCOUNTER FOR SCREENING FOR MALIGNANT NEOPLASM OF COLON: ICD-10-CM

## 2022-10-26 DIAGNOSIS — Z12.31 ENCOUNTER FOR SCREENING MAMMOGRAM FOR MALIGNANT NEOPLASM OF BREAST: ICD-10-CM

## 2022-10-26 DIAGNOSIS — R73.03 PREDIABETES: ICD-10-CM

## 2022-10-26 PROBLEM — K21.9 GASTROESOPHAGEAL REFLUX DISEASE: Status: ACTIVE | Noted: 2022-10-26

## 2022-10-26 PROBLEM — M17.12 OSTEOARTHRITIS OF LEFT KNEE: Status: ACTIVE | Noted: 2022-10-26

## 2022-10-26 PROCEDURE — 99214 OFFICE O/P EST MOD 30 MIN: CPT | Mod: S$PBB,,, | Performed by: NURSE PRACTITIONER

## 2022-10-26 PROCEDURE — 99214 PR OFFICE/OUTPT VISIT, EST, LEVL IV, 30-39 MIN: ICD-10-PCS | Mod: S$PBB,,, | Performed by: NURSE PRACTITIONER

## 2022-10-26 PROCEDURE — 99214 OFFICE O/P EST MOD 30 MIN: CPT | Mod: PBBFAC | Performed by: NURSE PRACTITIONER

## 2022-10-26 PROCEDURE — G0008 ADMIN INFLUENZA VIRUS VAC: HCPCS | Mod: PBBFAC

## 2022-10-26 PROCEDURE — 90662 IIV NO PRSV INCREASED AG IM: CPT | Mod: PBBFAC

## 2022-10-26 RX ORDER — SIMVASTATIN 10 MG/1
10 TABLET, FILM COATED ORAL NIGHTLY
Qty: 90 TABLET | Refills: 3 | Status: SHIPPED | OUTPATIENT
Start: 2022-10-26 | End: 2023-09-21 | Stop reason: SDUPTHER

## 2022-10-26 RX ORDER — AMLODIPINE BESYLATE 5 MG/1
5 TABLET ORAL DAILY
Qty: 90 TABLET | Refills: 3 | Status: SHIPPED | OUTPATIENT
Start: 2022-10-26 | End: 2023-02-15 | Stop reason: SDUPTHER

## 2022-10-26 RX ORDER — HYDROCHLOROTHIAZIDE 12.5 MG/1
12.5 TABLET ORAL DAILY
Qty: 90 TABLET | Refills: 3 | Status: SHIPPED | OUTPATIENT
Start: 2022-10-26 | End: 2023-02-15 | Stop reason: SDUPTHER

## 2022-10-26 RX ORDER — LOSARTAN POTASSIUM 100 MG/1
100 TABLET ORAL DAILY
Qty: 90 TABLET | Refills: 3 | Status: SHIPPED | OUTPATIENT
Start: 2022-10-26 | End: 2023-02-15 | Stop reason: SDUPTHER

## 2022-10-26 NOTE — ASSESSMENT & PLAN NOTE
At goal.  Refilled HCTZ and amlodipine.  Follow a low sodium (less than 2 grams of sodium per day), DASH diet.   Continue medications as prescribed.  Monitor blood pressure and report any consistent values greater than 140/90 and keep a log.  Maintain healthy weight with a BMI goal of <30.   Aerobic exercise for 150 minutes per week (or 5 days a week for 30 minutes each day).

## 2022-10-26 NOTE — ASSESSMENT & PLAN NOTE
/ Trig 79 / HDL - 55 / total chol - 183.  Refilled simvastatin.  Follow a low cholesterol, low saturated fat diet with less than 200 mg of cholesterol a day.   Avoid fried foods and high saturated fats (garcia, sausage, cookies, cakes, chips, cheese, whole milk, butter, mayonnaise, creamy dressings, gravy, stew, gumbo, boudin, cracklins and cream sauces).  Add flax seed or fish oil supplements to diet.   Increase dietary fiber.   Regular exercise improves cholesterol levels.  Physical activity 5 times a week for 30 minutes per day (or 150 minutes per week).   Stressed importance of dietary modifications.

## 2022-10-26 NOTE — ASSESSMENT & PLAN NOTE
Refilled fosamax.  Dexa scan previously ordered.  Take Calcium 600 mg twice a day and Vitamin D 2000 IU daily.  Weight bearing exercises 4-5 times per week to build bones.  Avoid soda and excessive alcohol.  Avoid falls, wear proper footwear, and use proper lighting when ambulating.  Smoking cessation encouraged.  Repeat Dexascan was due 7/2021-reordered.

## 2022-10-26 NOTE — PROGRESS NOTES
ALE Galan   [facility]  [address] [facility] [address]      PATIENT NAME: Kika Farias  : 1945  DATE: 10/26/22  MRN: [id number]      Billing Provider: ALE Galan  Level of Service:   Patient PCP Information       Provider PCP Type    Primary Doctor No General            Reason for Visit / Chief Complaint: Follow-up (Refills, wants flu vaccine)       History of Present Illness / Problem Focused Workflow     Kika Farias is a 76 y.o. [place] female presents to the clinic for prediabetes and HTN f/u. PMH HTN, HLD, prediabetes, macular degeneration, osteoporosis, left knee OA, uterine prolapse and ureteral carbuncle. Pt followed by [facility] urogyn, audiology and optho clinics.  #[id number], Rose, used during visit. Reports continued med compliance. Continues to follow dash diet. Denies any pessary complaints. Amendable to flu vaccine today. MMG and dexa scan reordered today d/t unable to contact pt. Denies chest pain, shortness of breath, cough, fever, headache, dizziness, weakness, abdominal pain, nausea, vomiting, diarrhea, constipation, dysuria, depression, anxiety, SI/HI.    Cervical Cancer Screening - No longer recommended per ACOG guidelines.  Breast Cancer Screening - Last Mammogram on 20. Birads 1. MMG again reordered. Follow up annually.  Colon Cancer Screening - Cologuard ordered. Will follow up annually.  Osteoporosis Screening - Dexa scan on 19. Results show osteoporosis. Repeat Dexa in 2 yrs (2021)- again reordered.  Vaccinations: Flu- 10/26/22 / Covid - 21 & 21 / Shingles #1- 20 & #2 -3/31/21 / Tetanus - UTD (19) pneumo 23- UTD (19) / Pneumo 13- 19  Labwork - UTD    Review of Systems     Review of Systems   Constitutional: Negative.    HENT: Negative.     Eyes: Negative.    Respiratory: Negative.     Cardiovascular: Negative.    Gastrointestinal: Negative.     Endocrine: Negative.    Genitourinary: Negative.    Musculoskeletal: Negative.    Integumentary:  Negative.   Neurological: Negative.    Psychiatric/Behavioral: Negative.        Medical / Social / Family History     Past Medical History:   Diagnosis Date    Hyperlipidemia     Hypertension        Past Surgical History:   Procedure Laterality Date    TUBAL LIGATION         Social History  Ms. Farias reports that she has never smoked. She has never used smokeless tobacco. She reports that she does not drink alcohol and does not use drugs.    Family History  's family history is not on file.    Medications and Allergies     Medications  Medication List with Changes/Refills   Current Medications    ALENDRONATE (FOSAMAX) 70 MG TABLET    Take 70 mg by mouth every 7 days.    IBUPROFEN (ADVIL,MOTRIN) 800 MG TABLET    Take 800 mg by mouth every 8 (eight) hours.    OMEPRAZOLE (PRILOSEC) 20 MG CAPSULE    Take 20 mg by mouth once daily.    PREMARIN VAGINAL CREAM    Place 1 g vaginally once daily.   Changed and/or Refilled Medications    Modified Medication Previous Medication    AMLODIPINE (NORVASC) 5 MG TABLET amLODIPine (NORVASC) 5 MG tablet       Take 1 tablet (5 mg total) by mouth once daily.    Take 1 tablet (5 mg total) by mouth once daily.    HYDROCHLOROTHIAZIDE (HYDRODIURIL) 12.5 MG TAB hydroCHLOROthiazide (HYDRODIURIL) 12.5 MG Tab       Take 1 tablet (12.5 mg total) by mouth once daily.    Take 12.5 mg by mouth once daily.    LOSARTAN (COZAAR) 100 MG TABLET losartan (COZAAR) 100 MG tablet       Take 1 tablet (100 mg total) by mouth once daily.    Take 100 mg by mouth once daily.    SIMVASTATIN (ZOCOR) 10 MG TABLET simvastatin (ZOCOR) 10 MG tablet       Take 1 tablet (10 mg total) by mouth every evening.    Take 1 tablet (10 mg total) by mouth every evening.         Allergies  Review of patient's allergies indicates:  No Known Allergies    Physical Examination   Vital Signs  Temp: 98.2 °F (36.8 °C)  Temp src:  Oral  Pulse: 76  Resp: 18  BP: 137/74  BP Location: Right arm  Patient Position: Sitting  Pain Score: 0-No pain  Height and Weight  Height: 5' (152.4 cm)  Weight: 61.3 kg (135 lb 2.3 oz)  BSA (Calculated - sq m): 1.61 sq meters  BMI (Calculated): 26.4  Weight in (lb) to have BMI = 25: 127.7]   Physical Exam  Vitals reviewed.   Constitutional:       Appearance: Normal appearance.   HENT:      Head: Normocephalic.   Eyes:      Pupils: Pupils are equal, round, and reactive to light.   Cardiovascular:      Rate and Rhythm: Normal rate and regular rhythm.      Heart sounds: Normal heart sounds.   Pulmonary:      Effort: Pulmonary effort is normal.      Breath sounds: Normal breath sounds.   Abdominal:      General: Bowel sounds are normal.      Palpations: Abdomen is soft.   Skin:     General: Skin is warm.   Neurological:      Mental Status: She is alert and oriented to person, place, and time.   Psychiatric:         Mood and Affect: Mood normal.         Speech: Speech normal.         Behavior: Behavior is cooperative.         Judgment: Judgment normal.         Results     Lab Results   Component Value Date    WBC 9.5 11/29/2021    RBC 4.75 11/29/2021    HGB 14.6 11/29/2021    HCT 43.5 11/29/2021    MCV 91.6 11/29/2021    MCH 30.7 11/29/2021    MCHC 33.6 11/29/2021    RDW 12.2 11/29/2021     11/29/2021    MPV 11.0 (H) 11/29/2021     CMP  Sodium Level   Date Value Ref Range Status   11/29/2021 131 (L) 136 - 145 mmol/L Final     Potassium Level   Date Value Ref Range Status   11/29/2021 4.7 3.5 - 5.1 mmol/L Final     Carbon Dioxide   Date Value Ref Range Status   11/29/2021 26 23 - 31 mmol/L Final     Blood Urea Nitrogen   Date Value Ref Range Status   11/29/2021 13.8 9.8 - 20.1 mg/dL Final     Creatinine   Date Value Ref Range Status   11/29/2021 0.68 0.55 - 1.02 mg/dL Final     Calcium Level Total   Date Value Ref Range Status   11/29/2021 10.4 8.7 - 10.5 mg/dL Final     Albumin Level   Date Value Ref Range  Status   11/29/2021 4.4 3.4 - 4.8 gm/dL Final     Bilirubin Total   Date Value Ref Range Status   11/29/2021 0.7 <<=1.5 mg/dL Final     Alkaline Phosphatase   Date Value Ref Range Status   11/29/2021 86 40 - 150 unit/L Final     Aspartate Aminotransferase   Date Value Ref Range Status   11/29/2021 29 5 - 34 unit/L Final     Alanine Aminotransferase   Date Value Ref Range Status   11/29/2021 15 0 - 55 unit/L Final     Estimated GFR-Non    Date Value Ref Range Status   11/29/2021 89 (L) >>=90 mL/min/1.73 m2 Final     Lab Results   Component Value Date    CHOL 183 07/20/2021     Lab Results   Component Value Date    HDL 55 07/20/2021     No results found for: LDLCALC  Lab Results   Component Value Date    TRIG 79 07/20/2021     No results found for: CHOLHDL  Lab Results   Component Value Date    TSH 1.4445 07/20/2021     Lab Results   Component Value Date    PHUR 6.5 12/22/2020    SPECGRAV 1.005 09/30/2019    PROTEINUA Negative 12/22/2020    GLUCUA Negative 12/22/2020    KETONESU Negative 12/22/2020    OCCULTUA Negative 12/22/2020    NITRITE Negative 12/22/2020    LEUKOCYTESUR 25 (A) 12/22/2020           Assessment and Plan (including Health Maintenance)     Plan: Virtual visit in 4 weeks and prn. Labs to be completed prior to appt.         Health Maintenance Due   Topic Date Due    Hepatitis C Screening  Never done    COVID-19 Vaccine (4 - Booster for Pfizer series) 04/14/2022    DEXA Scan  07/23/2022       Problem List Items Addressed This Visit          Ophtho    Early dry stage nonexudative age-related macular degeneration of both eyes    Current Assessment & Plan     Keep optho appts as scheduled.            Cardiac/Vascular    Hyperlipidemia    Current Assessment & Plan      / Trig 79 / HDL - 55 / total chol - 183.  Refilled simvastatin.  Follow a low cholesterol, low saturated fat diet with less than 200 mg of cholesterol a day.   Avoid fried foods and high saturated fats (garcia, sausage,  cookies, cakes, chips, cheese, whole milk, butter, mayonnaise, creamy dressings, gravy, stew, gumbo, boudin, cracklins and cream sauces).  Add flax seed or fish oil supplements to diet.   Increase dietary fiber.   Regular exercise improves cholesterol levels.  Physical activity 5 times a week for 30 minutes per day (or 150 minutes per week).   Stressed importance of dietary modifications.           Relevant Medications    simvastatin (ZOCOR) 10 MG tablet    Other Relevant Orders    Lipid Panel    Hypertension    Current Assessment & Plan     At goal.  Refilled HCTZ and amlodipine.  Follow a low sodium (less than 2 grams of sodium per day), DASH diet.   Continue medications as prescribed.  Monitor blood pressure and report any consistent values greater than 140/90 and keep a log.  Maintain healthy weight with a BMI goal of <30.   Aerobic exercise for 150 minutes per week (or 5 days a week for 30 minutes each day).           Relevant Medications    amLODIPine (NORVASC) 5 MG tablet    Other Relevant Orders    Comprehensive Metabolic Panel    CBC Auto Differential    Microalbumin/Creatinine Ratio, Urine    T4, Free    TSH    Urinalysis, Reflex to Urine Culture Urine, Clean Catch       Renal/    Female genital prolapse    Current Assessment & Plan     Keep urogyn/gyn appts as scheduled.  Report foul odor, discharge or problems with pessary immediately.              Endocrine    Prediabetes    Current Assessment & Plan     Previous A1C 5.5.  Will continue to monitor.  Avoid soda, simple sweets, and limit rice/pasta/bread/starches and consume brown options when possible.   Maintain healthy weight with BMI goal <30.   Perform aerobic exercise for 150 minutes per week (or 5 days a week for 30 minutes each day).   Examine feet daily.            Relevant Orders    Hemoglobin A1C       Orthopedic    Osteoporosis    Current Assessment & Plan     Refilled fosamax.  Dexa scan previously ordered.  Take Calcium 600 mg twice a day  and Vitamin D 2000 IU daily.  Weight bearing exercises 4-5 times per week to build bones.  Avoid soda and excessive alcohol.  Avoid falls, wear proper footwear, and use proper lighting when ambulating.  Smoking cessation encouraged.  Repeat Dexascan was due 7/2021-reordered.           Relevant Orders    DXA Bone Density Appendicular Skeleton     Other Visit Diagnoses       Need for vaccination    -  Primary    Relevant Orders    Influenza - Quadrivalent - High Dose (65+) (PF) (IM) (Completed)    Encounter for screening mammogram for malignant neoplasm of breast        Relevant Orders    Mammo Digital Screening Bilat    Encounter for screening for malignant neoplasm of colon        Relevant Orders    Cologuard Screening (Multitarget Stool DNA)            Health Maintenance Topics with due status: Not Due       Topic Last Completion Date    TETANUS VACCINE 04/04/2019    Lipid Panel 07/20/2021       Future Appointments   Date Time Provider Department Center   2/20/2023  8:45 AM RESIDENTS, [facility] [place]    3/28/2023  7:45 AM PROVIDERS, [facility] [place]            Signature:  ALE Galan  [facility]  [address] [address]    Date of encounter: 10/26/22

## 2022-10-26 NOTE — ASSESSMENT & PLAN NOTE
Previous A1C 5.5.  Will continue to monitor.  Avoid soda, simple sweets, and limit rice/pasta/bread/starches and consume brown options when possible.   Maintain healthy weight with BMI goal <30.   Perform aerobic exercise for 150 minutes per week (or 5 days a week for 30 minutes each day).   Examine feet daily.

## 2022-10-26 NOTE — ASSESSMENT & PLAN NOTE
Keep urogyn/gyn appts as scheduled.  Report foul odor, discharge or problems with pessary immediately.

## 2022-11-10 ENCOUNTER — HOSPITAL ENCOUNTER (OUTPATIENT)
Dept: RADIOLOGY | Facility: HOSPITAL | Age: 77
Discharge: HOME | End: 2022-11-10
Attending: NURSE PRACTITIONER
Payer: MEDICARE

## 2022-11-10 DIAGNOSIS — Z12.31 ENCOUNTER FOR SCREENING MAMMOGRAM FOR MALIGNANT NEOPLASM OF BREAST: ICD-10-CM

## 2022-11-10 DIAGNOSIS — M81.0 AGE-RELATED OSTEOPOROSIS WITHOUT CURRENT PATHOLOGICAL FRACTURE: ICD-10-CM

## 2022-11-10 PROCEDURE — 77067 SCR MAMMO BI INCL CAD: CPT | Mod: TC

## 2022-11-10 PROCEDURE — 77067 SCR MAMMO BI INCL CAD: CPT | Mod: 26,,, | Performed by: RADIOLOGY

## 2022-11-10 PROCEDURE — 77081 DXA BONE DENSITY APPENDICULR: CPT | Mod: TC

## 2022-11-10 PROCEDURE — 77063 BREAST TOMOSYNTHESIS BI: CPT | Mod: 26,,, | Performed by: RADIOLOGY

## 2022-11-10 PROCEDURE — 77067 MAMMO DIGITAL SCREENING BILAT WITH TOMO: ICD-10-PCS | Mod: 26,,, | Performed by: RADIOLOGY

## 2022-11-10 PROCEDURE — 77063 MAMMO DIGITAL SCREENING BILAT WITH TOMO: ICD-10-PCS | Mod: 26,,, | Performed by: RADIOLOGY

## 2022-11-16 ENCOUNTER — LAB VISIT (OUTPATIENT)
Dept: LAB | Facility: HOSPITAL | Age: 77
End: 2022-11-16
Attending: NURSE PRACTITIONER
Payer: MEDICARE

## 2022-11-16 DIAGNOSIS — I10 PRIMARY HYPERTENSION: ICD-10-CM

## 2022-11-16 DIAGNOSIS — R73.03 PREDIABETES: ICD-10-CM

## 2022-11-16 DIAGNOSIS — E78.2 MIXED HYPERLIPIDEMIA: ICD-10-CM

## 2022-11-16 LAB
ALBUMIN SERPL-MCNC: 3.9 GM/DL (ref 3.4–4.8)
ALBUMIN/GLOB SERPL: 1.5 RATIO (ref 1.1–2)
ALP SERPL-CCNC: 89 UNIT/L (ref 40–150)
ALT SERPL-CCNC: 11 UNIT/L (ref 0–55)
AST SERPL-CCNC: 19 UNIT/L (ref 5–34)
BASOPHILS # BLD AUTO: 0.06 X10(3)/MCL (ref 0–0.2)
BASOPHILS NFR BLD AUTO: 0.8 %
BILIRUBIN DIRECT+TOT PNL SERPL-MCNC: 0.7 MG/DL
BUN SERPL-MCNC: 12.3 MG/DL (ref 9.8–20.1)
CALCIUM SERPL-MCNC: 9.6 MG/DL (ref 8.4–10.2)
CHLORIDE SERPL-SCNC: 94 MMOL/L (ref 98–107)
CHOLEST SERPL-MCNC: 191 MG/DL
CHOLEST/HDLC SERPL: 3 {RATIO} (ref 0–5)
CO2 SERPL-SCNC: 29 MMOL/L (ref 23–31)
CREAT SERPL-MCNC: 0.78 MG/DL (ref 0.55–1.02)
EOSINOPHIL # BLD AUTO: 0.23 X10(3)/MCL (ref 0–0.9)
EOSINOPHIL NFR BLD AUTO: 3.3 %
ERYTHROCYTE [DISTWIDTH] IN BLOOD BY AUTOMATED COUNT: 12.4 % (ref 11.5–17)
EST. AVERAGE GLUCOSE BLD GHB EST-MCNC: 119.8 MG/DL
GFR SERPLBLD CREATININE-BSD FMLA CKD-EPI: >60 MLS/MIN/1.73/M2
GLOBULIN SER-MCNC: 2.6 GM/DL (ref 2.4–3.5)
GLUCOSE SERPL-MCNC: 97 MG/DL (ref 82–115)
HBA1C MFR BLD: 5.8 %
HCT VFR BLD AUTO: 39 % (ref 37–47)
HDLC SERPL-MCNC: 62 MG/DL (ref 35–60)
HGB BLD-MCNC: 13.2 GM/DL (ref 12–16)
IMM GRANULOCYTES # BLD AUTO: 0.04 X10(3)/MCL (ref 0–0.04)
IMM GRANULOCYTES NFR BLD AUTO: 0.6 %
LDLC SERPL CALC-MCNC: 113 MG/DL (ref 50–140)
LYMPHOCYTES # BLD AUTO: 2.23 X10(3)/MCL (ref 0.6–4.6)
LYMPHOCYTES NFR BLD AUTO: 31.5 %
MCH RBC QN AUTO: 30.3 PG (ref 27–31)
MCHC RBC AUTO-ENTMCNC: 33.8 MG/DL (ref 33–36)
MCV RBC AUTO: 89.4 FL (ref 80–94)
MONOCYTES # BLD AUTO: 0.69 X10(3)/MCL (ref 0.1–1.3)
MONOCYTES NFR BLD AUTO: 9.8 %
NEUTROPHILS # BLD AUTO: 3.8 X10(3)/MCL (ref 2.1–9.2)
NEUTROPHILS NFR BLD AUTO: 54 %
NRBC BLD AUTO-RTO: 0 %
PLATELET # BLD AUTO: 352 X10(3)/MCL (ref 130–400)
PMV BLD AUTO: 9.4 FL (ref 7.4–10.4)
POTASSIUM SERPL-SCNC: 4.6 MMOL/L (ref 3.5–5.1)
PROT SERPL-MCNC: 6.5 GM/DL (ref 5.8–7.6)
RBC # BLD AUTO: 4.36 X10(6)/MCL (ref 4.2–5.4)
SODIUM SERPL-SCNC: 133 MMOL/L (ref 136–145)
T4 FREE SERPL-MCNC: 1.21 NG/DL (ref 0.7–1.48)
TRIGL SERPL-MCNC: 81 MG/DL (ref 37–140)
TSH SERPL-ACNC: 1.53 UIU/ML (ref 0.35–4.94)
VLDLC SERPL CALC-MCNC: 16 MG/DL
WBC # SPEC AUTO: 7.1 X10(3)/MCL (ref 4.5–11.5)

## 2022-11-16 PROCEDURE — 83036 HEMOGLOBIN GLYCOSYLATED A1C: CPT

## 2022-11-16 PROCEDURE — 80061 LIPID PANEL: CPT

## 2022-11-16 PROCEDURE — 84443 ASSAY THYROID STIM HORMONE: CPT

## 2022-11-16 PROCEDURE — 36415 COLL VENOUS BLD VENIPUNCTURE: CPT

## 2022-11-16 PROCEDURE — 84439 ASSAY OF FREE THYROXINE: CPT

## 2022-11-16 PROCEDURE — 85025 COMPLETE CBC W/AUTO DIFF WBC: CPT

## 2022-11-16 PROCEDURE — 80053 COMPREHEN METABOLIC PANEL: CPT

## 2022-11-21 ENCOUNTER — TELEPHONE (OUTPATIENT)
Dept: INTERNAL MEDICINE | Facility: CLINIC | Age: 77
End: 2022-11-21
Payer: MEDICARE

## 2022-11-23 ENCOUNTER — TELEPHONE (OUTPATIENT)
Dept: INTERNAL MEDICINE | Facility: CLINIC | Age: 77
End: 2022-11-23
Payer: MEDICARE

## 2022-11-23 NOTE — TELEPHONE ENCOUNTER
Unable to contact pt via phone for virtual visit on today. Voicemail full, unable to leave message. Provider made aware.

## 2023-02-15 ENCOUNTER — HOSPITAL ENCOUNTER (EMERGENCY)
Facility: HOSPITAL | Age: 78
Discharge: HOME | End: 2023-02-15
Attending: STUDENT IN AN ORGANIZED HEALTH CARE EDUCATION/TRAINING PROGRAM
Payer: MEDICARE

## 2023-02-15 VITALS
WEIGHT: 141.13 LBS | RESPIRATION RATE: 16 BRPM | HEIGHT: 62 IN | BODY MASS INDEX: 25.97 KG/M2 | HEART RATE: 81 BPM | SYSTOLIC BLOOD PRESSURE: 172 MMHG | TEMPERATURE: 98 F | OXYGEN SATURATION: 97 % | DIASTOLIC BLOOD PRESSURE: 69 MMHG

## 2023-02-15 DIAGNOSIS — Z76.0 ISSUE OF REPEAT PRESCRIPTION: ICD-10-CM

## 2023-02-15 DIAGNOSIS — I10 HYPERTENSION, UNSPECIFIED TYPE: Primary | ICD-10-CM

## 2023-02-15 DIAGNOSIS — I10 ASYMPTOMATIC HYPERTENSION: ICD-10-CM

## 2023-02-15 DIAGNOSIS — S83.90XA SPRAIN OF KNEE, UNSPECIFIED LATERALITY, UNSPECIFIED LIGAMENT, INITIAL ENCOUNTER: ICD-10-CM

## 2023-02-15 PROCEDURE — 99283 EMERGENCY DEPT VISIT LOW MDM: CPT

## 2023-02-15 RX ORDER — HYDROCHLOROTHIAZIDE 12.5 MG/1
12.5 TABLET ORAL DAILY
Qty: 30 TABLET | Refills: 0 | Status: SHIPPED | OUTPATIENT
Start: 2023-02-15 | End: 2023-03-21 | Stop reason: SDUPTHER

## 2023-02-15 RX ORDER — DICLOFENAC SODIUM 10 MG/G
2 GEL TOPICAL DAILY
Qty: 50 G | Refills: 0 | Status: SHIPPED | OUTPATIENT
Start: 2023-02-15 | End: 2023-03-13 | Stop reason: SDUPTHER

## 2023-02-15 RX ORDER — AMLODIPINE BESYLATE 5 MG/1
5 TABLET ORAL DAILY
Qty: 30 TABLET | Refills: 0 | Status: SHIPPED | OUTPATIENT
Start: 2023-02-15 | End: 2023-03-21 | Stop reason: SDUPTHER

## 2023-02-15 RX ORDER — METHYLPREDNISOLONE 4 MG/1
TABLET ORAL
Qty: 1 EACH | Refills: 0 | Status: SHIPPED | OUTPATIENT
Start: 2023-02-15 | End: 2023-03-21 | Stop reason: ALTCHOICE

## 2023-02-15 RX ORDER — LOSARTAN POTASSIUM 100 MG/1
100 TABLET ORAL DAILY
Qty: 30 TABLET | Refills: 0 | Status: SHIPPED | OUTPATIENT
Start: 2023-02-15 | End: 2023-03-13 | Stop reason: SDUPTHER

## 2023-02-15 NOTE — ED PROVIDER NOTES
Encounter Date: 2/15/2023       History     Chief Complaint   Patient presents with    Knee Pain     CO RT KNEE PAIN X 2 MONTHS.  NO INJURY.  ALSO REQUESTING MED REFILL FOR HTN.  HAS APT COMING UP IN CLINIC SOON.      Patient reports to the ER with complaints of right knee pain that started after bending down to pick something up; pt reports pain with bending and prolonged standing ever since    The history is provided by the patient.   Knee Pain  This is a new problem. The current episode started more than 1 week ago. The problem has not changed since onset.Pertinent negatives include no chest pain, no abdominal pain, no headaches and no shortness of breath. The symptoms are aggravated by bending, twisting and standing. The symptoms are relieved by rest. She has tried rest for the symptoms. The treatment provided mild relief.   Review of patient's allergies indicates:  No Known Allergies  Past Medical History:   Diagnosis Date    Hyperlipidemia     Hypertension      Past Surgical History:   Procedure Laterality Date    TUBAL LIGATION       No family history on file.  Social History     Tobacco Use    Smoking status: Never    Smokeless tobacco: Never   Substance Use Topics    Alcohol use: Never    Drug use: Never     Review of Systems   Constitutional:  Negative for fever.   HENT:  Negative for sore throat.    Eyes: Negative.    Respiratory:  Negative for shortness of breath.    Cardiovascular:  Negative for chest pain.   Gastrointestinal:  Negative for abdominal pain and nausea.   Genitourinary:  Negative for dysuria.   Musculoskeletal:  Negative for back pain.   Skin:  Negative for rash.   Neurological:  Negative for weakness and headaches.   Hematological:  Does not bruise/bleed easily.   Psychiatric/Behavioral: Negative.       Physical Exam     Initial Vitals [02/15/23 1343]   BP Pulse Resp Temp SpO2   (!) 172/69 81 16 97.9 °F (36.6 °C) 97 %      MAP       --         Physical Exam    Vitals  reviewed.  Constitutional: She appears well-developed and well-nourished.   HENT:   Head: Normocephalic and atraumatic.   Eyes: Conjunctivae and EOM are normal. Pupils are equal, round, and reactive to light.   Neck: Neck supple.   Normal range of motion.  Cardiovascular:  Normal rate, regular rhythm and normal heart sounds.           Pulmonary/Chest: Breath sounds normal. No respiratory distress. She has no wheezes. She exhibits no tenderness.   Abdominal: Abdomen is soft. Bowel sounds are normal. She exhibits no distension. There is no abdominal tenderness. There is no rebound.   Musculoskeletal:         General: No edema.      Cervical back: Normal range of motion and neck supple.      Right knee: No swelling. Decreased range of motion. Tenderness present. Normal pulse.      Left knee: Normal.        Legs:      Neurological: She is alert and oriented to person, place, and time. She displays normal reflexes. No cranial nerve deficit or sensory deficit.   Skin: Skin is warm and dry.   Psychiatric: She has a normal mood and affect. Her behavior is normal. Judgment and thought content normal.       ED Course   Procedures  Labs Reviewed - No data to display       Imaging Results              X-Ray Knee 3 View Right (Final result)  Result time 02/15/23 15:36:22      Final result by Sandeep Clement MD (02/15/23 15:36:22)                   Impression:      No acute osseous process appreciated.      Electronically signed by: Sandeep Clement  Date:    02/15/2023  Time:    15:36               Narrative:    EXAMINATION:  XR KNEE 3 VIEW RIGHT    CLINICAL HISTORY:  Pain, unspecified    TECHNIQUE:  AP, lateral, and Merchant views of the right knee.    COMPARISON:  Radiography 02/15/2017    FINDINGS:  No acute fracture identified.  Joint alignments are maintained with mild underlying degenerative changes.  Suspect small knee effusion.  Mild vascular calcifications.                                       Medications - No data to  display        APC / Resident Notes:   I was not physically present during the history, exam or disposition of this patient. I was available at all times for consultation. (Zmora)                   Clinical Impression:   Final diagnoses:  [I10] Asymptomatic hypertension  [Z76.0] Issue of repeat prescription  [S83.90XA] Sprain of knee, unspecified laterality, unspecified ligament, initial encounter        ED Disposition Condition    Discharge Stable          ED Prescriptions       Medication Sig Dispense Start Date End Date Auth. Provider    methylPREDNISolone (MEDROL DOSEPACK) 4 mg tablet Dispense and take as directed on packaging 1 each 2/15/2023 3/8/2023 OLIVIA Canas    diclofenac sodium (VOLTAREN ARTHRITIS PAIN) 1 % Gel Apply 2 g topically once daily. for 7 days 50 g 2/15/2023 2/22/2023 OLIVIA Canas    amLODIPine (NORVASC) 5 MG tablet Take 1 tablet (5 mg total) by mouth once daily. 30 tablet 2/15/2023 3/17/2023 OLIVIA Canas    hydroCHLOROthiazide (HYDRODIURIL) 12.5 MG Tab Take 1 tablet (12.5 mg total) by mouth once daily. 30 tablet 2/15/2023 3/17/2023 OLIVIA Canas    losartan (COZAAR) 100 MG tablet Take 1 tablet (100 mg total) by mouth once daily. 30 tablet 2/15/2023 3/17/2023 OLIVIA Canas          Follow-up Information       Follow up With Specialties Details Why Contact Info    ALE Bhardwaj Nurse Practitioner   [address] [facility]  Internal Medicine Clinic  [place] [id number] [phone]      discharge info    Discussed all pertinent ED information, results, diagnosis and treatment plan; All questions and concerns were addressed at this time. Patient voices understanding of information and instructions. Patient is comfortable with plan and discharge    discharge followup    If your symptoms become WORSE or you DO NOT IMPROVE and you are unable to reach your health care provider, you should RETURN to the emergency department             Eliezer Andrew  PA  02/15/23 1640       Mariano Stein MD  02/15/23 0288

## 2023-03-01 RX ORDER — OMEPRAZOLE 20 MG/1
CAPSULE, DELAYED RELEASE ORAL
Qty: 90 CAPSULE | Refills: 0 | Status: SHIPPED | OUTPATIENT
Start: 2023-03-01 | End: 2023-06-05

## 2023-03-01 NOTE — TELEPHONE ENCOUNTER
Contact pt for a F2F clinic visit. Has not been r/s'd from no show in Nov. Pt does not need any labs. Thanks

## 2023-03-13 ENCOUNTER — OFFICE VISIT (OUTPATIENT)
Dept: GYNECOLOGY | Facility: CLINIC | Age: 78
End: 2023-03-13
Payer: MEDICARE

## 2023-03-13 VITALS
HEART RATE: 82 BPM | RESPIRATION RATE: 20 BRPM | BODY MASS INDEX: 25.76 KG/M2 | DIASTOLIC BLOOD PRESSURE: 74 MMHG | OXYGEN SATURATION: 98 % | HEIGHT: 62 IN | TEMPERATURE: 98 F | SYSTOLIC BLOOD PRESSURE: 135 MMHG | WEIGHT: 140 LBS

## 2023-03-13 DIAGNOSIS — Z46.89 PESSARY MAINTENANCE: ICD-10-CM

## 2023-03-13 DIAGNOSIS — N95.2 VAGINAL ATROPHY: ICD-10-CM

## 2023-03-13 DIAGNOSIS — N84.1 CERVICAL POLYP: ICD-10-CM

## 2023-03-13 DIAGNOSIS — I10 HYPERTENSION, UNSPECIFIED TYPE: ICD-10-CM

## 2023-03-13 DIAGNOSIS — N81.9 FEMALE GENITAL PROLAPSE, UNSPECIFIED TYPE: Primary | ICD-10-CM

## 2023-03-13 PROCEDURE — 99213 OFFICE O/P EST LOW 20 MIN: CPT | Mod: PBBFAC

## 2023-03-13 RX ORDER — CONJUGATED ESTROGENS 0.62 MG/G
1 CREAM VAGINAL DAILY
Qty: 30 G | Refills: 6 | Status: SHIPPED | OUTPATIENT
Start: 2023-03-13

## 2023-03-13 RX ORDER — LOSARTAN POTASSIUM 100 MG/1
100 TABLET ORAL DAILY
Qty: 30 TABLET | Refills: 0 | Status: SHIPPED | OUTPATIENT
Start: 2023-03-13 | End: 2023-03-21 | Stop reason: SDUPTHER

## 2023-03-13 RX ORDER — DICLOFENAC SODIUM 10 MG/G
2 GEL TOPICAL DAILY
Qty: 50 G | Refills: 0 | Status: SHIPPED | OUTPATIENT
Start: 2023-03-13 | End: 2023-03-21 | Stop reason: SDUPTHER

## 2023-03-13 NOTE — PROGRESS NOTES
[facility] OB/GYN CLINIC NOTE  [facility]  [address]  [place] [address]  Phone: [phone]  Fax: [phone]    Subjective:     Kika Farias is a 76 y.o.  with Stage III Ba prolapse managed with a #2 3/4 Gellhorn pessary. She was last seen 2022 with no issues and presents today for a pessary check.    Patient is satisfied with the pessary and having no issues. She voids comfortable at home, denies incomplete emptying or symptoms of bladder outlet obstruction. Denies JAYSON. Denies vaginal bleeding, discharge, foul odor. She is compliant with vaginal estrogen, using once per week. Today she requests refill of her BP and OA meds as she does not have a PCP.    Allergies: NKDA    MedHx:   Past Medical History:   Diagnosis Date    Hyperlipidemia     Hypertension      SurgHx:   Past Surgical History:   Procedure Laterality Date    TUBAL LIGATION       Medications:     Current Outpatient Medications:     alendronate (FOSAMAX) 70 MG tablet, Take 70 mg by mouth every 7 days., Disp: , Rfl:     amLODIPine (NORVASC) 5 MG tablet, Take 1 tablet (5 mg total) by mouth once daily., Disp: 30 tablet, Rfl: 0    hydroCHLOROthiazide (HYDRODIURIL) 12.5 MG Tab, Take 1 tablet (12.5 mg total) by mouth once daily., Disp: 30 tablet, Rfl: 0    ibuprofen (ADVIL,MOTRIN) 800 MG tablet, Take 800 mg by mouth every 8 (eight) hours., Disp: , Rfl:     losartan (COZAAR) 100 MG tablet, Take 1 tablet (100 mg total) by mouth once daily., Disp: 30 tablet, Rfl: 0    omeprazole (PRILOSEC) 20 MG capsule, Take 1 capsule by mouth once daily, Disp: 90 capsule, Rfl: 0    PREMARIN vaginal cream, Place 1 g vaginally once daily., Disp: 30 g, Rfl: 6    simvastatin (ZOCOR) 10 MG tablet, Take 1 tablet (10 mg total) by mouth every evening., Disp: 90 tablet, Rfl: 3    diclofenac sodium (VOLTAREN ARTHRITIS PAIN) 1 % Gel, Apply 2 g topically once daily. for 7 days, Disp: 50 g, Rfl: 0    FM Hx: Denies hx of ovarian, uterine, endometrial, or colon cancer. Denies  "history of bleeding or coagulation disorders.  History reviewed. No pertinent family history.    Social Hx: Denies tobacco, alcohol and illicit drug usage.  Social History     Socioeconomic History    Marital status: Single   Tobacco Use    Smoking status: Never    Smokeless tobacco: Never   Substance and Sexual Activity    Alcohol use: Never    Drug use: Never    Sexual activity: Yes     Partners: Male     Birth control/protection: See Surgical Hx     Social Determinants of Health     Transportation Needs: No Transportation Needs    Lack of Transportation (Medical): No    Lack of Transportation (Non-Medical): No   Housing Stability: Low Risk     Unable to Pay for Housing in the Last Year: No    Number of Places Lived in the Last Year: 1    Unstable Housing in the Last Year: No       Review of Systems  Denies fevers, chills, headache, blurry vision, nausea, vomiting, dizziness, or syncope.   Denies chest pain, shortness of breath, RUQ pain, or calf pain.    Objective:     Vitals:    03/13/23 0818   BP: 135/74   BP Location: Left arm   Patient Position: Sitting   BP Method: Small (Automatic)   Pulse: 82   Resp: 20   Temp: 97.7 °F (36.5 °C)   TempSrc: Oral   SpO2: 98%   Weight: 63.5 kg (140 lb)   Height: 5' 2" (1.575 m)     Body mass index is 25.61 kg/m².    Physical Exam:     General: alert and oriented, in no acute distress  Lungs: clear to auscultation bilaterally, no conversational dyspnea  Heart: regular rate and rhythm  Abdomen: Soft, non-distended, non tender to palpation, no involuntary guarding, no rebound tenderness  Extremities: Normal, atraumatic, non-edematous, No cords or calf tenderness, No significant calf/ankle edema  External genitalia: Normal female genitalia for age and menstrual status without lesion, discharge or tenderness. Normal appearing urethral meatus. Normal appearing external anus.  Bimanual Exam: No inguinal lymphadenopathy noted bilaterally. Vagina with adequate capacity. No adnexal " fullness/tenderness. Normal urethra. Normal bladder  Speculum Exam: Vaginal mucosa normal in appearance for age and menstrual status, pale and thin with loss of rugae however no excoriations or fissures. No masses/lesions. Cervix well visualized, smooth in contour. Small 1cm cervical polyp noted on posterior cervix, not friable or bleeding, regular appearing. Os normal in appearance, no blood or discharge coming from the os    Note: RN chaperone present for entirety of exam.     Imaging  No images are attached to the encounter.  Assessment/Plan:    Kika Farias is a 77 y.o.  with Stage III Ba prolapse managed with a #2 3/4 Gellhorn pessary.    Pelvic organ prolapse:  Happy with current management  Exam reassuring  Continue vaginal estrogen cream, refill sent  Encourage patient to increase to 2-3 nights per week  Health maintenance  Pap: N/A, age > 65 with history of normal Pap smears  Mammogram 2022: Results were BIRADS 1  Colonoscopy: none referral to PCP  Bone density: DXA 10/2022 with evidence of known osteoporosis  Diclofenac and Losartan refilled  Referral sent to PCP  RTC in 6 months for pessary check.    Future Appointments   Date Time Provider Department Center   3/21/2023  7:15 AM ALE Bhardwaj [facility] JAMAAL [place]   3/28/2023  7:45 AM PROVIDERS, J[facility]IE [place]   2023  8:45 AM RESIDENTS, [facility] GYN [facility] [place]       Patient and plan were discussed with Dr. Kimbrough.    Belen Bassett MD  [employer] [facility] PGY3

## 2023-03-21 ENCOUNTER — OFFICE VISIT (OUTPATIENT)
Dept: INTERNAL MEDICINE | Facility: CLINIC | Age: 78
End: 2023-03-21
Payer: MEDICARE

## 2023-03-21 VITALS
TEMPERATURE: 99 F | HEART RATE: 80 BPM | DIASTOLIC BLOOD PRESSURE: 66 MMHG | SYSTOLIC BLOOD PRESSURE: 138 MMHG | WEIGHT: 141 LBS | HEIGHT: 62 IN | RESPIRATION RATE: 20 BRPM | BODY MASS INDEX: 25.95 KG/M2

## 2023-03-21 DIAGNOSIS — R73.03 PREDIABETES: Chronic | ICD-10-CM

## 2023-03-21 DIAGNOSIS — I10 PRIMARY HYPERTENSION: Chronic | ICD-10-CM

## 2023-03-21 DIAGNOSIS — I10 HYPERTENSION, UNSPECIFIED TYPE: ICD-10-CM

## 2023-03-21 DIAGNOSIS — M17.11 PRIMARY OSTEOARTHRITIS OF RIGHT KNEE: Chronic | ICD-10-CM

## 2023-03-21 DIAGNOSIS — Z00.00 WELLNESS EXAMINATION: ICD-10-CM

## 2023-03-21 DIAGNOSIS — E78.2 MIXED HYPERLIPIDEMIA: Primary | Chronic | ICD-10-CM

## 2023-03-21 DIAGNOSIS — K21.9 GASTROESOPHAGEAL REFLUX DISEASE WITHOUT ESOPHAGITIS: Chronic | ICD-10-CM

## 2023-03-21 DIAGNOSIS — N81.9 FEMALE GENITAL PROLAPSE, UNSPECIFIED TYPE: Chronic | ICD-10-CM

## 2023-03-21 DIAGNOSIS — M81.0 AGE-RELATED OSTEOPOROSIS WITHOUT CURRENT PATHOLOGICAL FRACTURE: Chronic | ICD-10-CM

## 2023-03-21 PROBLEM — E78.5 HYPERLIPIDEMIA: Chronic | Status: ACTIVE | Noted: 2022-10-26

## 2023-03-21 PROBLEM — M17.12 OSTEOARTHRITIS OF LEFT KNEE: Chronic | Status: ACTIVE | Noted: 2022-10-26

## 2023-03-21 PROBLEM — H35.3131 EARLY DRY STAGE NONEXUDATIVE AGE-RELATED MACULAR DEGENERATION OF BOTH EYES: Chronic | Status: ACTIVE | Noted: 2022-09-30

## 2023-03-21 PROCEDURE — 99214 PR OFFICE/OUTPT VISIT, EST, LEVL IV, 30-39 MIN: ICD-10-PCS | Mod: S$PBB,,, | Performed by: NURSE PRACTITIONER

## 2023-03-21 PROCEDURE — 99214 OFFICE O/P EST MOD 30 MIN: CPT | Mod: PBBFAC | Performed by: NURSE PRACTITIONER

## 2023-03-21 PROCEDURE — 99214 OFFICE O/P EST MOD 30 MIN: CPT | Mod: S$PBB,,, | Performed by: NURSE PRACTITIONER

## 2023-03-21 RX ORDER — HYDROCHLOROTHIAZIDE 12.5 MG/1
12.5 TABLET ORAL DAILY
Qty: 90 TABLET | Refills: 3 | Status: SHIPPED | OUTPATIENT
Start: 2023-03-21 | End: 2024-03-28

## 2023-03-21 RX ORDER — DICLOFENAC SODIUM 10 MG/G
2 GEL TOPICAL 3 TIMES DAILY PRN
Qty: 100 G | Refills: 3 | Status: SHIPPED | OUTPATIENT
Start: 2023-03-21 | End: 2023-09-21

## 2023-03-21 RX ORDER — LOSARTAN POTASSIUM 100 MG/1
100 TABLET ORAL DAILY
Qty: 90 TABLET | Refills: 3 | Status: SHIPPED | OUTPATIENT
Start: 2023-03-21 | End: 2024-02-06

## 2023-03-21 RX ORDER — ALENDRONATE SODIUM 70 MG/1
70 TABLET ORAL
Qty: 12 TABLET | Refills: 3 | Status: SHIPPED | OUTPATIENT
Start: 2023-03-21 | End: 2024-01-25

## 2023-03-21 RX ORDER — AMLODIPINE BESYLATE 5 MG/1
5 TABLET ORAL DAILY
Qty: 90 TABLET | Refills: 3 | Status: SHIPPED | OUTPATIENT
Start: 2023-03-21

## 2023-03-21 NOTE — PROGRESS NOTES
[place], ALE   [facility]  [address] [facility] [address]      PATIENT NAME: Kika Farias  : 1945  DATE: 3/21/23  MRN: [id number]      Reason for Visit / Chief Complaint: Follow-up (Test results, needs 90 days supply on refills)       History of Present Illness / Problem Focused Workflow     Kika Farias is a 77 y.o. [place] female presents to the clinic for HTN and prediabetes f/u. PMH HTN, HLD, prediabetes, macular degeneration, osteoporosis, left knee OA, uterine prolapse and ureteral carbuncle. Pt followed by [facility] GYN, audiology and optho clinics. Next GYN visit 23, and next optho visit 3/28/23.    Today, entire visit completed with  Sreedhar #[id number]. Pt reports med compliance. Denies any problems with pessary. Pt reports worsening achy, throbbing right knee pain, 8/10, today, which is keeping her awake at night. . Presented to ED for evaluation on 2/15/23 and was rx'd medrol dose pack and voltaren gel with some improvement. Is requesting refill on volateran gel. Denies chest pain, shortness of breath, cough, fever, headache, dizziness, weakness, abdominal pain, nausea, vomiting, diarrhea, constipation, dysuria, depression, anxiety, SI/HI.    Review of Systems     Review of Systems     See HPI for details    Constitutional: Denies Change in appetite. Denies Chills. Denies Fever. Denies Night sweats.  Eye: Denies Blurred vision. Denies Discharge. Denies Eye pain.  ENT: Denies Decreased hearing. Denies Sore throat. Denies Swollen glands.  Respiratory: Denies Cough. Denies Shortness of breath. Denies Shortness of breath with exertion. Denies Wheezing.  Cardiovascular: Denies Chest pain at rest. Denies Chest pain with exertion. Denies Irregular heartbeat. Denies Palpitations. Denies Edema.  Gastrointestinal: Denies Abdominal pain. Denies Diarrhea. Denies Nausea. Denies Vomiting. Denies Hematemesis or  Hematochezia.  Genitourinary: Denies Dysuria. Denies Urinary frequency. Denies Urinary urgency. Denies Blood in urine.  Endocrine: Denies Cold intolerance. Denies Excessive thirst. Denies Heat intolerance. Denies Weight loss. Denies Weight gain.  Musculoskeletal: Admits Painful joints. Denies Weakness.  Integumentary: Denies Rash. Denies Itching. Denies Dry skin.  Neurologic: Denies Dizziness. Denies Fainting. Denies Headache.  Psychiatric: Denies Depression. Denies Anxiety. Denies Suicidal/Homicidal ideations.    All Other ROS: Negative except as stated in HPI.     Medical / Surgical / Social / Family History       ----------------------------  Hyperlipidemia  Hypertension     Past Surgical History:   Procedure Laterality Date    TUBAL LIGATION         Social History     Socioeconomic History    Marital status: Single   Tobacco Use    Smoking status: Never    Smokeless tobacco: Never   Substance and Sexual Activity    Alcohol use: Never    Drug use: Never    Sexual activity: Yes     Partners: Male     Birth control/protection: See Surgical Hx     Social Determinants of Health     Transportation Needs: No Transportation Needs    Lack of Transportation (Medical): No    Lack of Transportation (Non-Medical): No   Housing Stability: Low Risk     Unable to Pay for Housing in the Last Year: No    Number of Places Lived in the Last Year: 1    Unstable Housing in the Last Year: No        History reviewed. No pertinent family history.     Medications and Allergies     Medications  Current Outpatient Medications   Medication Instructions    alendronate (FOSAMAX) 70 mg, Oral, Every 7 days    amLODIPine (NORVASC) 5 mg, Oral, Daily    diclofenac sodium (VOLTAREN ARTHRITIS PAIN) 2 g, Topical (Top), 3 times daily PRN    hydroCHLOROthiazide (HYDRODIURIL) 12.5 mg, Oral, Daily    ibuprofen (ADVIL,MOTRIN) 800 mg, Oral, Every 8 hours    losartan (COZAAR) 100 mg, Oral, Daily    omeprazole (PRILOSEC) 20 MG capsule Take 1 capsule by mouth  "once daily    PREMARIN 1 g, Vaginal, Daily    simvastatin (ZOCOR) 10 mg, Oral, Nightly         Allergies  Review of patient's allergies indicates:  No Known Allergies    Physical Examination     /66 (BP Location: Right arm, Patient Position: Sitting, BP Method: Medium (Automatic))   Pulse 80   Temp 98.5 °F (36.9 °C) (Oral)   Resp 20   Ht 5' 2.01" (1.575 m)   Wt 64 kg (141 lb)   BMI 25.78 kg/m²     Physical Exam  Musculoskeletal:      Right knee: Swelling and crepitus present. Decreased range of motion. Tenderness present.       General: Alert and oriented, No acute distress.  Head: Normocephalic, Atraumatic.  Eye: Pupils are equal, round and reactive to light, Extraocular movements are intact, Sclera non-icteric.  Ears/Nose/Throat: Normal, Mucosa moist,Clear.  Neck/Thyroid: Supple, Non-tender, No carotid bruit, No lymphadenopathy, No JVD, Full range of motion.  Respiratory: Clear to auscultation bilaterally; No wheezes, rales or rhonchi,Non-labored respirations, Symmetrical chest wall expansion.  Cardiovascular: Regular rate and rhythm, S1/S2 normal, No murmurs, rubs or gallops.  Gastrointestinal: Soft, Non-tender, Non-distended, Normal bowel sounds, No palpable organomegaly.  Integumentary: Warm, Dry, Intact, No suspicious lesions or rashes.  Extremities: No clubbing, cyanosis or edema  Neurologic: No focal deficits, Cranial Nerves II-XII are grossly intact, Motor strength normal upper and lower extremities, Sensory exam intact.  Psychiatric: Normal interaction, Coherent speech, Euthymic mood, Appropriate affect       Results     Lab Results   Component Value Date    WBC 7.1 11/16/2022    HGB 13.2 11/16/2022    HCT 39.0 11/16/2022     11/16/2022    CHOL 191 11/16/2022    TRIG 81 11/16/2022    ALT 11 11/16/2022    AST 19 11/16/2022     (L) 11/16/2022    K 4.6 11/16/2022    CREATININE 0.78 11/16/2022    BUN 12.3 11/16/2022    CO2 29 11/16/2022    TSH 1.5322 11/16/2022    INR 0.92 11/29/2021 "    HGBA1C 5.8 11/16/2022     Narrative & Impression  EXAMINATION:  DEXA BONE DENSITY APPENDICULAR SKELETON     CLINICAL HISTORY:  Age-related osteoporosis without current pathological fracture     COMPARISON:  23 July 2019     FINDINGS:  BMD     T-score     1.209 0.1.  Lumbar Spine (L1-L4).  Previous BMD 1.140     0.688 -2.5.  Total Left Femur.  Previous BMD 0.680     0.768 -1.9.  Total Right Femur.  Previous BMD 0.733     0.728 -2.2.  Mean Femur     Impression:     Osteoporosis based on the left hip.  Significantly increased fracture risk.        Electronically signed by: Sreekanth Farooq  Date:                                            11/10/2022  Time:                                           08:53           Exam Ended: 11/10/22 08:05 Last Resulted: 11/10/22 08:53           Narrative & Impression      - MAMMO DIGITAL SCREENING BILAT WITH NICOLLE     BILATERAL DIGITAL SCREENING MAMMOGRAM 3D/2D WITH CAD: 11/10/2022  HISTORY: 76-year-old woman presents for screening mammogram.       COMPARISONS: Comparison is made to exams dated:  7/27/2020 mammogram, 7/23/2019 mammogram, 5/1/2018 mammogram - [facility], 4/18/2017 mammogram, 4/8/2016 mammogram - [facility], and 3/6/2012 mammogram - [facility].       TECHNIQUE: Digital mammography views were performed with tomosynthesis. Current study was evaluated with a Computer Aided Detection (CAD) system.      BREAST COMPOSITION: The breasts are heterogeneously dense, which may obscure small masses.       FINDINGS:   No suspicious mass, asymmetry, distortion, or calcification is identified.         IMPRESSION: NEGATIVE  Right Breast: Negative (BI-RADS 1)  Left Breast: Negative (BI-RADS 1)     Recommendations:  Recommend continued annual screening mammography (with Digital Breast Tomosynthesis), according to American College of Radiology guidelines.           Julio Kaye M.D.            lm/:11/10/2022 13:50:35           letter sent: Mammography Normal    Mammogram BI-RADS: 1 Negative    Narrative & Impression  EXAMINATION:  XR KNEE 3 VIEW RIGHT     CLINICAL HISTORY:  Pain, unspecified     TECHNIQUE:  AP, lateral, and Merchant views of the right knee.     COMPARISON:  Radiography 02/15/2017     FINDINGS:  No acute fracture identified.  Joint alignments are maintained with mild underlying degenerative changes.  Suspect small knee effusion.  Mild vascular calcifications.     Impression:     No acute osseous process appreciated.        Electronically signed by: Sandeep Clement  Date:                                            02/15/2023  Time:                                           15:36    Assessment and Plan (including Health Maintenance)     Plan:     1. Mixed hyperlipidemia    Continue zocor.  Follow a low cholesterol, low saturated fat diet with less than 200 mg of cholesterol a day.   Avoid fried foods and high saturated fats (garcia, sausage, cookies, cakes, chips, cheese, whole milk, butter, mayonnaise, creamy dressings, gravy and cream sauces).  Add flax seed or fish oil supplements to diet.   Increase dietary fiber.   Regular exercise improves cholesterol levels.  Physical activity 5 times a week for 30 minutes per day (or 150 minutes per week).   Stressed importance of dietary modifications.      2. Primary hypertension  At goal.  Continue losartan, HCTZ and amlodipine.  Follow a low sodium (less than 2 grams of sodium per day), DASH diet.   Continue medications as prescribed.  Monitor blood pressure and report any consistent values greater than 140/90 and keep a log.  Maintain healthy weight with a BMI goal of <30.   Aerobic exercise for 150 minutes per week (or 5 days a week for 30 minutes each day).      3. Female genital prolapse, unspecified type  Keep gyn visits with as scheduled.  ED precautions.    4. Prediabetes  A1C 5.8 at goal.  Desires continued monitoring with dietary modifications.  Avoid soda, simple sweets,  and limit rice/pasta/bread/starches and consume brown options when possible.   Maintain healthy weight with BMI goal <30.   Perform aerobic exercise for 150 minutes per week (or 5 days a week for 30 minutes each day).   Examine feet daily.     - Hemoglobin A1C; Future  - Comprehensive Metabolic Panel; Future    5. Gastroesophageal reflux disease without esophagitis  Refilled omeprazole.  Avoid spicy, acidic, fried food and alcohol.   Eat 2-3 hours before bed.  Avoid tight fitting clothes and chew food thoroughly.   Reduce caffeine intake, avoid soda.   Take meds as prescribed.       6. Primary osteoarthritis of right knee  Referral to ortho to eval/treat.  HEP encouraged.  Perform knee exercises daily.   Avoid activities than increase knee pain or stiffness.   Apply heating pad, ice pack, and BioFreeze/topical capsaicin as needed; alternate every 15-20 minutes.   Continue tylenol arthritis and voltaren gel as needed.    - Ambulatory referral/consult to Orthopedics; Future    7. Age-related osteoporosis without current pathological fracture    Continue Calcium 600mg twice per day and Vitamin D 2000IU daily. Continue fosamax.  Take meds as prescribed.  Maintain a healthy BMI of <30.  Weight bearing exercise such as walking or resistance training to build bones 5 times a week.  Perform strengthening, range of motion and low-impact aerobic exercises (walking, water aerobics, cycling) as recommended to control pain and improve joint function.  Avoid soda and excessive alcohol.  Use assistive devices as needed for ambulation and fall prevention.  Avoid falls by assessing home for loose cords and rugs, wearing proper footwear, and using proper lighting in rooms.  Repeat DEXA in 10/2024          Problem List Items Addressed This Visit          Cardiac/Vascular    Hyperlipidemia - Primary (Chronic)    Hypertension (Chronic)    Relevant Medications    amLODIPine (NORVASC) 5 MG tablet       Renal/    Female genital prolapse  (Chronic)       Endocrine    Prediabetes (Chronic)    Relevant Orders    Hemoglobin A1C    Comprehensive Metabolic Panel       GI    Gastroesophageal reflux disease (Chronic)       Orthopedic    Osteoporosis (Chronic)    Primary osteoarthritis of right knee (Chronic)    Relevant Orders    Ambulatory referral/consult to Orthopedics     Other Visit Diagnoses       Wellness examination        Relevant Orders    Follow-up primary physician              Health Maintenance Due   Topic Date Due    Hepatitis C Screening  Never done    COVID-19 Vaccine (4 - Booster for Pfizer series) 04/14/2022     Schedule Saturday Medicare Wellness Visit.  Follow up in about 6 months (around 9/21/2023) for Follow up. Labs one week prior to appt..        Signature:  ALE Galan  [facility]  [address] [facility] [address]

## 2023-03-27 ENCOUNTER — OFFICE VISIT (OUTPATIENT)
Dept: ORTHOPEDICS | Facility: CLINIC | Age: 78
End: 2023-03-27
Payer: MEDICARE

## 2023-03-27 VITALS — BODY MASS INDEX: 25.65 KG/M2 | WEIGHT: 139.38 LBS | HEIGHT: 62 IN

## 2023-03-27 DIAGNOSIS — M17.11 PRIMARY OSTEOARTHRITIS OF RIGHT KNEE: Chronic | ICD-10-CM

## 2023-03-27 PROCEDURE — 99203 OFFICE O/P NEW LOW 30 MIN: CPT | Mod: 25,,, | Performed by: REHABILITATION UNIT

## 2023-03-27 PROCEDURE — 20610 DRAIN/INJ JOINT/BURSA W/O US: CPT | Mod: RT,,, | Performed by: REHABILITATION UNIT

## 2023-03-27 PROCEDURE — 99203 PR OFFICE/OUTPT VISIT, NEW, LEVL III, 30-44 MIN: ICD-10-PCS | Mod: 25,,, | Performed by: REHABILITATION UNIT

## 2023-03-27 PROCEDURE — 20610 LARGE JOINT ASPIRATION/INJECTION: R KNEE: ICD-10-PCS | Mod: RT,,, | Performed by: REHABILITATION UNIT

## 2023-03-27 RX ORDER — BETAMETHASONE SODIUM PHOSPHATE AND BETAMETHASONE ACETATE 3; 3 MG/ML; MG/ML
12 INJECTION, SUSPENSION INTRA-ARTICULAR; INTRALESIONAL; INTRAMUSCULAR; SOFT TISSUE
Status: DISCONTINUED | OUTPATIENT
Start: 2023-03-27 | End: 2023-03-27 | Stop reason: HOSPADM

## 2023-03-27 RX ORDER — LIDOCAINE HYDROCHLORIDE 20 MG/ML
4 INJECTION, SOLUTION INFILTRATION; PERINEURAL
Status: DISCONTINUED | OUTPATIENT
Start: 2023-03-27 | End: 2023-03-27 | Stop reason: HOSPADM

## 2023-03-27 RX ADMIN — BETAMETHASONE SODIUM PHOSPHATE AND BETAMETHASONE ACETATE 12 MG: 3; 3 INJECTION, SUSPENSION INTRA-ARTICULAR; INTRALESIONAL; INTRAMUSCULAR; SOFT TISSUE at 10:03

## 2023-03-27 RX ADMIN — LIDOCAINE HYDROCHLORIDE 4 MG: 20 INJECTION, SOLUTION INFILTRATION; PERINEURAL at 10:03

## 2023-03-27 NOTE — PROGRESS NOTES
Subjective:      Patient ID: Kika Farias is a 77 y.o. female.    Chief Complaint: Knee Pain (rt knee pain ongoing for about a month. states she kneeled down and after that she has had started to have increase pain. she has been having swelling. mornings she is unable to put pressure.)    HPI:   Kika Farias is a 77 y.o. female who presents today for initial evaluation of her right knee.  She reports around 1 month history of right knee pain.  No prior issues.  Pain is localized anteriorly and medially.  She was kneeling down and when she arose started having increased pain.  Occasional mechanical issues.  She is had persistent swelling.  Pain is worse with weight-bearing activities.  She has not had any therapy or injections.  She is been taking some medications following an ER visit.  No sensory or motor changes distally.   was used.    Past Medical History:   Diagnosis Date    Hyperlipidemia     Hypertension      Past Surgical History:   Procedure Laterality Date    TUBAL LIGATION       Social History     Socioeconomic History    Marital status: Single   Tobacco Use    Smoking status: Never    Smokeless tobacco: Never   Substance and Sexual Activity    Alcohol use: Never    Drug use: Never    Sexual activity: Yes     Partners: Male     Birth control/protection: See Surgical Hx     Social Determinants of Health     Transportation Needs: No Transportation Needs    Lack of Transportation (Medical): No    Lack of Transportation (Non-Medical): No   Housing Stability: Low Risk     Unable to Pay for Housing in the Last Year: No    Number of Places Lived in the Last Year: 1    Unstable Housing in the Last Year: No         Current Outpatient Medications:     alendronate (FOSAMAX) 70 MG tablet, Take 1 tablet (70 mg total) by mouth every 7 days., Disp: 12 tablet, Rfl: 3    amLODIPine (NORVASC) 5 MG tablet, Take 1 tablet (5 mg total) by mouth once daily., Disp: 90 tablet, Rfl: 3    diclofenac sodium  "(VOLTAREN ARTHRITIS PAIN) 1 % Gel, Apply 2 g topically 3 (three) times daily as needed (pain)., Disp: 100 g, Rfl: 3    hydroCHLOROthiazide (HYDRODIURIL) 12.5 MG Tab, Take 1 tablet (12.5 mg total) by mouth once daily., Disp: 90 tablet, Rfl: 3    ibuprofen (ADVIL,MOTRIN) 800 MG tablet, Take 800 mg by mouth every 8 (eight) hours., Disp: , Rfl:     losartan (COZAAR) 100 MG tablet, Take 1 tablet (100 mg total) by mouth once daily., Disp: 90 tablet, Rfl: 3    omeprazole (PRILOSEC) 20 MG capsule, Take 1 capsule by mouth once daily, Disp: 90 capsule, Rfl: 0    PREMARIN vaginal cream, Place 1 g vaginally once daily., Disp: 30 g, Rfl: 6    simvastatin (ZOCOR) 10 MG tablet, Take 1 tablet (10 mg total) by mouth every evening., Disp: 90 tablet, Rfl: 3  Review of patient's allergies indicates:  No Known Allergies    Ht 5' 2" (1.575 m)   Wt 63.2 kg (139 lb 6.4 oz)   BMI 25.50 kg/m²     Comprehensive review of systems completed and negative except as per HPI.        Objective:   Head: Normocephalic, without obvious abnormality, atraumatic  Eyes: conjunctivae/corneas clear. EOM's intact  Ears: normal external appearance  Nose: Nares normal. Septum midline. Mucosa normal. No drainage  Throat: normal findings: lips normal without lesions  Lungs: unlabored breathing on room air  Chest wall: symmetric chest rise  Heart: regular rate and rhythm  Pulses: 2+ and symmetric  Skin: Skin color, texture, turgor normal. No rashes or lesions  Neurologic: Grossly normal    right KNEE:     Alignment: neutral  Appearance: skin in intact without lesion  Effusion: small  Gait: antalgic  Straight Leg Raise: negative  Log Roll: negative  Hip ROM: full and painless  Ankle ROM: full and painless   Knee ROM:  0 - 120  Tenderness: medial joint line    Patellar Mobility: decreased  Patellar grind: negative  PF Crepitus: negative        Hui Test: negative   Valgus Stress @ 0 deg: stable  Valgus Stress @ 30 deg: stable  Varus Stress @ 0 deg: " stable  Varus Stress @ 30 deg: stable  Lachman: stable  Ant Drawer: negative   Post Drawer: negative  Posterior Sag Sign: negative  Neurological deficits: SILT dp/sp/t distributions  Motor: 5/5 EHL/FHL/TA/GS    Warm well perfused lower extremity with capillary refill less than 2 seconds and sensation intact to light touch in the terminal nerve distributions. Calf soft and easily compressible without clinical sign of DVT. No palpable popliteal lymphadenopathy    Assessment:     Imaging:   X-Ray Knee 3 View Right  Narrative: EXAMINATION:  XR KNEE 3 VIEW RIGHT    CLINICAL HISTORY:  Pain, unspecified    TECHNIQUE:  AP, lateral, and Merchant views of the right knee.    COMPARISON:  Radiography 02/15/2017    FINDINGS:  No acute fracture identified.  Joint alignments are maintained with mild underlying degenerative changes.  Suspect small knee effusion.  Mild vascular calcifications.  Impression: No acute osseous process appreciated.    Electronically signed by: Sandeep Clement  Date:    02/15/2023  Time:    15:36    Radiographs of the right knee reviewed showing no acute fractures or dislocations.  Mild degenerative changes.    Large Joint Aspiration/Injection: R knee    Date/Time: 3/27/2023 10:15 AM  Performed by: Te Delgado MD  Authorized by: Te Delgado MD     Consent Done?:  Yes (Verbal)  Indications:  Pain  Site marked: the procedure site was marked    Timeout: prior to procedure the correct patient, procedure, and site was verified    Prep: patient was prepped and draped in usual sterile fashion    Local anesthesia used?: No      Details:  Needle Size:  21 G  Ultrasonic Guidance for needle placement?: No    Approach:  Lateral  Location:  Knee  Site:  R knee  Medications:  12 mg betamethasone acetate-betamethasone sodium phosphate 6 mg/mL; 4 mg LIDOcaine HCL 20 mg/ml (2%) 20 mg/mL (2 %)  Patient tolerance:  Patient tolerated the procedure well with no immediate complications          1. Primary  osteoarthritis of right knee          Plan:          Imaging and exam findings discussed with the patient.  She has acute right knee pain following minimal trauma.  She has mild degenerative changes on radiographs.  We discussed options.  We will proceed with corticosteroid injection which was provided as above.  She tolerated this well.  Post-injection care discussed.  She will follow up in 6-8 weeks for repeat evaluation.  Anti-inflammatories as needed.  Ice her knee.  Low-impact exercises.  All questions were answered and she was in agreement with the plan.

## 2023-04-13 ENCOUNTER — HOSPITAL ENCOUNTER (EMERGENCY)
Facility: HOSPITAL | Age: 78
Discharge: HOME | End: 2023-04-13
Attending: EMERGENCY MEDICINE
Payer: MEDICARE

## 2023-04-13 VITALS
RESPIRATION RATE: 16 BRPM | OXYGEN SATURATION: 99 % | DIASTOLIC BLOOD PRESSURE: 76 MMHG | HEIGHT: 61 IN | HEART RATE: 78 BPM | BODY MASS INDEX: 26.03 KG/M2 | WEIGHT: 137.88 LBS | TEMPERATURE: 99 F | SYSTOLIC BLOOD PRESSURE: 143 MMHG

## 2023-04-13 DIAGNOSIS — S01.81XA FACIAL LACERATION, INITIAL ENCOUNTER: ICD-10-CM
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
## 2023-04-13 PROCEDURE — 12011 RPR F/E/E/N/L/M 2.5 CM/<: CPT

## 2023-04-13 PROCEDURE — 25000003 PHARM REV CODE 250: Performed by: PHYSICIAN ASSISTANT

## 2023-04-13 PROCEDURE — 99284 EMERGENCY DEPT VISIT MOD MDM: CPT | Mod: 25

## 2023-04-13 RX ORDER — ACETAMINOPHEN AND CODEINE PHOSPHATE 300; 30 MG/1; MG/1
1 TABLET ORAL
Status: COMPLETED | OUTPATIENT
Start: 2023-04-13 | End: 2023-04-13

## 2023-04-13 RX ORDER — LIDOCAINE HYDROCHLORIDE 10 MG/ML
5 INJECTION, SOLUTION EPIDURAL; INFILTRATION; INTRACAUDAL; PERINEURAL
Status: COMPLETED | OUTPATIENT
Start: 2023-04-13 | End: 2023-04-13

## 2023-04-13 RX ORDER — ACETAMINOPHEN AND CODEINE PHOSPHATE 300; 30 MG/1; MG/1
1 TABLET ORAL EVERY 6 HOURS PRN
Qty: 12 TABLET | Refills: 0 | Status: SHIPPED | OUTPATIENT
Start: 2023-04-13 | End: 2023-04-23

## 2023-04-13 RX ORDER — AMOXICILLIN AND CLAVULANATE POTASSIUM 875; 125 MG/1; MG/1
1 TABLET, FILM COATED ORAL 2 TIMES DAILY
Qty: 14 TABLET | Refills: 0 | Status: SHIPPED | OUTPATIENT
Start: 2023-04-13 | End: 2023-09-21 | Stop reason: ALTCHOICE

## 2023-04-13 RX ADMIN — ACETAMINOPHEN AND CODEINE PHOSPHATE 1 TABLET: 300; 30 TABLET ORAL at 01:04

## 2023-04-13 RX ADMIN — LIDOCAINE HYDROCHLORIDE 50 MG: 10 INJECTION, SOLUTION EPIDURAL; INFILTRATION; INTRACAUDAL; PERINEURAL at 03:04

## 2023-04-13 RX ADMIN — BACITRACIN ZINC, NEOMYCIN, POLYMYXIN B SULFAT 1 EACH: 5000; 3.5; 4 OINTMENT TOPICAL at 03:04

## 2023-04-13 NOTE — ED PROVIDER NOTES
Encounter Date: 4/13/2023       History     Chief Complaint   Patient presents with    Fall     Tripped and fell this am.  C/O sm cut to nose and back of head hurting.  No LOC.  No other complaints.      Patient reports to the ER with complaints of face and head pain after a trip and fall while walking at [facility]; pt denies LOC    The history is provided by the patient.   Fall  The accident occurred just prior to arrival. The fall occurred while walking. She fell from a height of 1 to 2 ft. She landed on A hard floor. The volume of blood lost was minimal. The point of impact was the face. The pain is at a severity of 4/10. She was Ambulatory at the scene. There was No entrapment after the fall. There was No drug use involved in the accident. There was No alcohol use involved in the accident. Associated symptoms include headaches. Pertinent negatives include no back pain, no visual change, no fever, no abdominal pain, no bowel incontinence, no nausea, no hematuria and no loss of consciousness.   Review of patient's allergies indicates:  No Known Allergies  Past Medical History:   Diagnosis Date    Hyperlipidemia     Hypertension      Past Surgical History:   Procedure Laterality Date    TUBAL LIGATION       No family history on file.  Social History     Tobacco Use    Smoking status: Never    Smokeless tobacco: Never   Substance Use Topics    Alcohol use: Never    Drug use: Never     Review of Systems   Constitutional:  Negative for fever.   HENT:  Negative for sore throat.    Eyes: Negative.    Respiratory:  Negative for shortness of breath.    Cardiovascular:  Negative for chest pain.   Gastrointestinal:  Negative for abdominal pain, bowel incontinence and nausea.   Genitourinary:  Negative for dysuria and hematuria.   Musculoskeletal:  Negative for back pain.   Skin:  Negative for rash.   Neurological:  Positive for headaches. Negative for loss of consciousness and weakness.   Hematological:  Does not bruise/bleed  "easily.   Psychiatric/Behavioral: Negative.       Physical Exam     Initial Vitals [04/13/23 1238]   BP Pulse Resp Temp SpO2   (!) 157/74 80 18 98.8 °F (37.1 °C) 99 %      MAP       --         Physical Exam    Vitals reviewed.  Constitutional: She appears well-developed and well-nourished.   HENT:   Head: Normocephalic. Head is with contusion. Head is without laceration.       Nose: Nose lacerations present.       Laceration present in the shape of a "V" at the bridge of nose; at the area where her glasses sit on the nose   Eyes: Conjunctivae and EOM are normal. Pupils are equal, round, and reactive to light.   Neck: Neck supple. No crepitus.   Normal range of motion.  Cardiovascular:  Normal rate, regular rhythm and normal heart sounds.           Pulmonary/Chest: Breath sounds normal. No respiratory distress. She has no wheezes. She exhibits no tenderness.   Abdominal: Abdomen is soft. Bowel sounds are normal. There is no abdominal tenderness.   Musculoskeletal:         General: Normal range of motion.      Cervical back: Normal range of motion and neck supple. Tenderness present. No lacerations or crepitus. Normal range of motion.      Thoracic back: Normal.        Back:      Neurological: She is alert and oriented to person, place, and time. She displays normal reflexes. No cranial nerve deficit or sensory deficit.   Skin: Skin is warm and dry.   Psychiatric: She has a normal mood and affect. Her behavior is normal. Judgment and thought content normal.       ED Course   Lac Repair    Date/Time: 4/13/2023 3:03 PM  Performed by: OLIVIA Canas  Authorized by: OLIVIA Canas     Consent:     Consent obtained:  Verbal    Consent given by:  Patient    Risks, benefits, and alternatives were discussed: yes      Risks discussed:  Infection, pain, poor wound healing, poor cosmetic result, need for additional repair, nerve damage and vascular damage    Alternatives discussed:  No treatment and delayed " treatment  Universal protocol:     Procedure explained and questions answered to patient or proxy's satisfaction: yes      Relevant documents present and verified: yes      Test results available: yes      Imaging studies available: yes      Required blood products, implants, devices, and special equipment available: yes      Site/side marked: yes      Immediately prior to procedure, a time out was called: yes      Patient identity confirmed:  Verbally with patient  Anesthesia:     Anesthesia method:  Local infiltration    Local anesthetic:  Lidocaine 1% w/o epi  Laceration details:     Location:  Face    Face location:  Nose    Length (cm):  2  Pre-procedure details:     Preparation:  Patient was prepped and draped in usual sterile fashion and imaging obtained to evaluate for foreign bodies  Exploration:     Hemostasis achieved with:  Direct pressure    Imaging outcome: foreign body not noted      Wound exploration: entire depth of wound visualized      Contaminated: no    Treatment:     Area cleansed with:  Povidone-iodine    Amount of cleaning:  Standard    Irrigation solution:  Sterile saline    Irrigation method:  Pressure wash    Debridement:  None    Undermining:  None  Skin repair:     Repair method:  Sutures    Suture size:  5-0    Suture material:  Prolene    Suture technique:  Simple interrupted    Number of sutures:  3  Approximation:     Approximation:  Close  Repair type:     Repair type:  Simple  Post-procedure details:     Dressing:  Antibiotic ointment and non-adherent dressing    Procedure completion:  Tolerated well, no immediate complications  Labs Reviewed - No data to display       Imaging Results              CT Maxillofacial Without Contrast (Final result)  Result time 04/13/23 14:36:27      Final result by Lio Loredo MD (04/13/23 14:36:27)                   Impression:      Subtle nondisplaced fracture of the tip nasal bone    Otherwise unremarkable      Electronically signed  by: Lio Loredo  Date:    04/13/2023  Time:    14:36               Narrative:    EXAMINATION:  CT MAXILLOFACIAL WITHOUT CONTRAST    CLINICAL HISTORY:  fall onto face/head - laceration present at bridge of nose;    TECHNIQUE:  Low dose axial images, sagittal and coronal reformations were obtained through the face.  Contrast was not administered. Automatic exposure control is utilized to reduce patient radiation exposure.    COMPARISON:  None    FINDINGS:  The mandible is intact.  The maxilla is intact.    The zygoma is intact bilaterally.    The medial and lateral pterygoids are intact.    The orbits are intact.  No orbital fracture is seen.    There appears to be a subtle nondisplaced fracture of the tip the nasal bone.    The paranasal sinuses appear normal..    No periorbital soft tissue swelling is seen.  No facial soft tissue swelling is seen.    The frontal bone is intact.    There are some soft tissue dermal calcifications seen which appear to be chronic in nature.                                       CT Cervical Spine Without Contrast (Final result)  Result time 04/13/23 14:30:45      Final result by Lio Loredo MD (04/13/23 14:30:45)                   Impression:      Loss of the normal lordotic curve of the cervical spine most likely related to spasm but otherwise unremarkable with no evidence of acute fracture or dislocation seen    Incidental note is made of some degenerative changes in the lower cervical spine      Electronically signed by: Lio Loredo  Date:    04/13/2023  Time:    14:30               Narrative:    EXAMINATION:  CT CERVICAL SPINE WITHOUT CONTRAST    CLINICAL HISTORY:  fall onto neck/back of head;    TECHNIQUE:  Low dose axial images, sagittal and coronal reformations were performed though the cervical spine.  Contrast was not administered. Automatic exposure control is utilized to reduce patient radiation exposure.    COMPARISON:  None    FINDINGS:  The  vertebral body heights are well maintained. There is some loss of the normal lordotic curve cervical spine most likely related to spasm. No fracture is seen. No dislocation is seen. The odontoid and lateral masses appear grossly unremarkable.  There are some degenerative changes seen in the lower cervical spine which appear to be chronic.                                       CT Head Without Contrast (Final result)  Result time 04/13/23 14:35:42      Final result by Boo Brown MD (04/13/23 14:35:42)                   Impression:      No acute intracranial abnormality identified.  Findings of chronic microvascular ischemic disease.      Electronically signed by: Boo Brown  Date:    04/13/2023  Time:    14:35               Narrative:    EXAMINATION:  CT HEAD WITHOUT CONTRAST    CLINICAL HISTORY:  fall onto back of head;    TECHNIQUE:  Low dose axial images were obtained through the head.  Coronal and sagittal reformations were also performed. Contrast was not administered.    Automatic exposure control was utilized to reduce the patient's radiation dose.    DLP= 1539    COMPARISON:  08/06/2015    FINDINGS:  No acute intracranial hemorrhage, edema or mass. No acute parenchymal abnormality.    Diffuse cerebral atrophy with concordant ventricular enlargement.    Scattered hypodensities throughout the deep periventricular white matter.    The osseous structures are normal.    The mastoid air cells are clear.    The auditory canals are patent bilaterally.    The globes and orbital contents are normal bilaterally.    The visualized maxillary, ethmoid and sphenoid sinuses are clear.                                       Medications   acetaminophen-codeine 300-30mg per tablet 1 tablet (1 tablet Oral Given 4/13/23 1346)   LIDOcaine (PF) 10 mg/ml (1%) injection 50 mg (50 mg Infiltration Given by Provider 4/13/23 9507)   neomycin-bacitracnZn-polymyxnB packet (1 each Topical (Top) Given 4/13/23 3476)                  ED Course as of 04/13/23 1558   Thu Apr 13, 2023   1554 Discussed with Dr Aviles (ENT) who recommended discharge on Augmentin, with followup in ENT Clinic next week - will call patient with appt [AL]      ED Course User Index  [AL] OLIVIA Canas                 Clinical Impression:   Final diagnoses:  [W19.XXXA] Fall, initial encounter (Primary)  [S01.81XA] Facial laceration, initial encounter        ED Disposition Condition    Discharge Stable          ED Prescriptions       Medication Sig Dispense Start Date End Date Auth. Provider    amoxicillin-clavulanate 875-125mg (AUGMENTIN) 875-125 mg per tablet Take 1 tablet by mouth 2 (two) times daily. 14 tablet 4/13/2023 -- OLIVIA Canas    acetaminophen-codeine 300-30mg (TYLENOL #3) 300-30 mg Tab Take 1 tablet by mouth every 6 (six) hours as needed. 12 tablet 4/13/2023 4/23/2023 OLIVIA Canas          Follow-up Information       Follow up With Specialties Details Why Contact Info    discharge followup  In 1 week For suture removal If your symptoms become WORSE or you DO NOT IMPROVE and you are unable to reach your health care provider, you should RETURN to the emergency department    discharge info    Discussed all pertinent ED information, results, diagnosis and treatment plan; All questions and concerns were addressed at this time. Patient voices understanding of information and instructions. Patient is comfortable with plan and discharge             OLIVIA Canas  04/13/23 1558

## 2023-05-10 ENCOUNTER — OFFICE VISIT (OUTPATIENT)
Dept: OTOLARYNGOLOGY | Facility: CLINIC | Age: 78
End: 2023-05-10
Payer: MEDICARE

## 2023-05-10 VITALS
HEART RATE: 76 BPM | DIASTOLIC BLOOD PRESSURE: 70 MMHG | SYSTOLIC BLOOD PRESSURE: 121 MMHG | HEIGHT: 61 IN | BODY MASS INDEX: 25.86 KG/M2 | WEIGHT: 137 LBS | TEMPERATURE: 98 F

## 2023-05-10 DIAGNOSIS — S01.21XA NASAL LACERATION, INITIAL ENCOUNTER: ICD-10-CM

## 2023-05-10 DIAGNOSIS — S02.2XXB OPEN FRACTURE OF NASAL BONE, INITIAL ENCOUNTER: Primary | ICD-10-CM

## 2023-05-10 PROCEDURE — 99213 OFFICE O/P EST LOW 20 MIN: CPT | Mod: PBBFAC | Performed by: OTOLARYNGOLOGY

## 2023-05-10 NOTE — PROGRESS NOTES
Patient Name: Kika Farias   YOB: 1945     Chief Complaint: nasal trauma       History of Present Illness:  May 10, 2023:   77-year-old  female presents today for follow-up for nasal trauma.  History was obtained with the aid of an .  The patient had fallen approximately 4 weeks ago on her face and was seen in the emergency room.  She sustained nasal laceration and nasal fracture.  The laceration was repaired in the emergency room.  Review of the CT scan obtained on April 13, 2023, shows presence of a minimally displaced nasal fracture.  No other fractures are noted. Patient also has left septal deviation which appears to be pre-existing.  Patient's primary concern today is to see what she needs to do to take care of her wound.  She did have some nasal swelling which has resolved.  She does not feel that there is any change in the overall appearance of her nose and she has not noticed any change in her nasal breathing.  For the most part she breathes well through her nose.  In regards to the wound she does have 1 remaining suture.  She does have some mild tenderness when she wears glasses.  No wound drainage.    She does not have a prior history of nasal trauma.    Past Medical History:  Past Medical History:   Diagnosis Date    Hyperlipidemia     Hypertension      Past Surgical History:   Procedure Laterality Date    TUBAL LIGATION         Review of Systems:  Unremarkable except as mentioned above.    Current Medications:  Current Outpatient Medications   Medication Sig    alendronate (FOSAMAX) 70 MG tablet Take 1 tablet (70 mg total) by mouth every 7 days.    amLODIPine (NORVASC) 5 MG tablet Take 1 tablet (5 mg total) by mouth once daily.    hydroCHLOROthiazide (HYDRODIURIL) 12.5 MG Tab Take 1 tablet (12.5 mg total) by mouth once daily.    ibuprofen (ADVIL,MOTRIN) 800 MG tablet Take 800 mg by mouth every 8 (eight) hours.    losartan (COZAAR) 100 MG tablet Take 1 tablet (100 mg  "total) by mouth once daily.    omeprazole (PRILOSEC) 20 MG capsule Take 1 capsule by mouth once daily    PREMARIN vaginal cream Place 1 g vaginally once daily.    simvastatin (ZOCOR) 10 MG tablet Take 1 tablet (10 mg total) by mouth every evening.    amoxicillin-clavulanate 875-125mg (AUGMENTIN) 875-125 mg per tablet Take 1 tablet by mouth 2 (two) times daily. (Patient not taking: Reported on 5/10/2023)    diclofenac sodium (VOLTAREN ARTHRITIS PAIN) 1 % Gel Apply 2 g topically 3 (three) times daily as needed (pain). (Patient not taking: Reported on 5/10/2023)     No current facility-administered medications for this visit.        Allergies:  Review of patient's allergies indicates:  No Known Allergies     Physical Exam:  Vital signs:   Vitals:    05/10/23 0900   BP: 121/70   BP Location: Right arm   Patient Position: Sitting   BP Method: Medium (Automatic)   Pulse: 76   Temp: 98.1 °F (36.7 °C)   TempSrc: Oral   Weight: 62.1 kg (137 lb)   Height: 5' 1" (1.549 m)   General:  Well-developed well-nourished female in no acute distress.  Voice is normal.  Head and face:  Normocephalic.  No facial lesions.  No temporomandibular joint tenderness or click.  Nose:  Patient does have a healing wound on the nasal dorsum.  There is 1 nylon suture in place which was removed.  Wound is healing well.  It is proximally 1/2 cm in length.  There is no tenderness for wound drainage.  Minimal edema.  Intranasally she does have left septal deviation.  No septal perforation or septal hematoma.  Noncongested.  Secretions are clear.  Nasal bones are stable.  Nasal dorsum appears to be straight.  Eyes:  Extraocular muscles intact.  No nystagmus.  No exophthalmos or enophthalmos.  Neurologic:  Alert and oriented.  Cranial nerves 2-12 are grossly normal.        Assessment/Plan:  77-year-old female status post fall sustaining a nasal fracture and nasal laceration.  Nasal fracture is healing well and intervention is not needed.  Nasal laceration " is also healing well.      Plan:  No intervention is planned for the nasal fracture.    For her nasal laceration I did discuss with her that over the next several months will undergo remodeling.  The only recommendation at this time would be for her to place Aquaphor b.i.d. to help moisturize and do this for at least the next month.    She will be seen for follow-up p.r.n..      Yony Ohara M.D.

## 2023-06-05 RX ORDER — OMEPRAZOLE 20 MG/1
20 CAPSULE, DELAYED RELEASE ORAL DAILY PRN
Qty: 90 CAPSULE | Refills: 0 | Status: SHIPPED | OUTPATIENT
Start: 2023-06-05 | End: 2023-09-21 | Stop reason: SDUPTHER

## 2023-06-23 ENCOUNTER — HOSPITAL ENCOUNTER (EMERGENCY)
Facility: HOSPITAL | Age: 78
Discharge: HOME | End: 2023-06-23
Attending: EMERGENCY MEDICINE
Payer: MEDICARE

## 2023-06-23 VITALS
RESPIRATION RATE: 18 BRPM | SYSTOLIC BLOOD PRESSURE: 128 MMHG | WEIGHT: 135 LBS | HEIGHT: 62 IN | DIASTOLIC BLOOD PRESSURE: 64 MMHG | OXYGEN SATURATION: 100 % | BODY MASS INDEX: 24.84 KG/M2 | HEART RATE: 80 BPM | TEMPERATURE: 98 F

## 2023-06-23 DIAGNOSIS — M25.512 ACUTE PAIN OF LEFT SHOULDER: Primary | ICD-10-CM

## 2023-06-23 PROCEDURE — 99283 EMERGENCY DEPT VISIT LOW MDM: CPT

## 2023-06-23 RX ORDER — ACETAMINOPHEN AND CODEINE PHOSPHATE 300; 30 MG/1; MG/1
1 TABLET ORAL EVERY 8 HOURS PRN
Qty: 9 TABLET | Refills: 0 | Status: SHIPPED | OUTPATIENT
Start: 2023-06-23 | End: 2023-09-21 | Stop reason: ALTCHOICE

## 2023-06-23 RX ORDER — DICLOFENAC SODIUM 10 MG/G
2 GEL TOPICAL DAILY
Qty: 100 G | Refills: 0 | Status: SHIPPED | OUTPATIENT
Start: 2023-06-23 | End: 2023-09-21

## 2023-06-23 NOTE — ED PROVIDER NOTES
Encounter Date: 6/23/2023       History     Chief Complaint   Patient presents with    Arm Pain     Pt complaint of pain to left upper arm and unable to lift arm that is worsening since yesterday. Pt denies and injury     77 y.o. female with a history of osteoporosis, arthritis, and hypertension presents to Emergency Department with a chief complaint of atraumatic L shoulder pain. Symptoms began on yesterday and have been constant since onset. Associated symptoms include none. Symptoms are aggravated with movement and exertion and there are no alleviating factors. The patient denies CP, SOB, fever, chills, abdominal pain, or dizziness. No other reported symptoms at this time      The history is provided by the patient. A  was used.   Illness   The current episode started yesterday. The problem occurs continuously. The problem has been unchanged. The symptoms are aggravated by activity and movement. Pertinent negatives include no fever, no decreased vision, no double vision, no photophobia, no abdominal pain, no nausea, no vomiting, no rhinorrhea, no sore throat, no stridor, no swollen glands, no shortness of breath, no URI and no wheezing. She has received no recent medical care.   Review of patient's allergies indicates:  No Known Allergies  Past Medical History:   Diagnosis Date    GERD (gastroesophageal reflux disease)     Hyperlipidemia     Hypertension     Osteoporosis     Unspecified osteoarthritis, unspecified site      Past Surgical History:   Procedure Laterality Date    TUBAL LIGATION       No family history on file.  Social History     Tobacco Use    Smoking status: Never    Smokeless tobacco: Never   Substance Use Topics    Alcohol use: Never    Drug use: Never     Review of Systems   Constitutional:  Negative for chills and fever.   HENT:  Negative for rhinorrhea and sore throat.    Eyes:  Negative for double vision, photophobia and visual disturbance.   Respiratory:  Negative for  shortness of breath, wheezing and stridor.    Cardiovascular:  Negative for chest pain, palpitations and leg swelling.   Gastrointestinal:  Negative for abdominal pain, nausea and vomiting.   Musculoskeletal:  Positive for arthralgias. Negative for back pain and gait problem.   Skin:  Negative for color change and wound.   Neurological:  Negative for dizziness, facial asymmetry, speech difficulty and weakness.   All other systems reviewed and are negative.    Physical Exam     Initial Vitals [06/23/23 1152]   BP Pulse Resp Temp SpO2   128/64 80 18 98.2 °F (36.8 °C) 100 %      MAP       --         Physical Exam    Nursing note and vitals reviewed.  Constitutional: She appears well-developed and well-nourished. She is not diaphoretic.  Non-toxic appearance. No distress.   HENT:   Head: Normocephalic and atraumatic.   Right Ear: External ear normal.   Left Ear: External ear normal.   Nose: Nose normal.   Mouth/Throat: Oropharynx is clear and moist. No oropharyngeal exudate.   Eyes: Conjunctivae and EOM are normal. Pupils are equal, round, and reactive to light.   Neck: Neck supple. No JVD present.   Normal range of motion.  Cardiovascular:  Normal rate, regular rhythm, S1 normal, S2 normal, normal heart sounds and normal pulses.           Pulmonary/Chest: Effort normal and breath sounds normal. No stridor. No respiratory distress. She has no wheezes. She has no rales.   Abdominal: Abdomen is soft. Bowel sounds are normal. She exhibits no distension. There is no abdominal tenderness. There is no guarding.   Musculoskeletal:         General: Tenderness present. Normal range of motion.      Right shoulder: Normal.      Left shoulder: Tenderness present. No swelling or deformity. Normal range of motion. Normal strength. Normal pulse.      Cervical back: Normal range of motion and neck supple.      Comments: Tenderness noted to L shoulder. Full 5/5 ROM noted. CMS intact. All other adjacent joints otherwise normal.         Neurological: She is alert and oriented to person, place, and time. She has normal strength. No sensory deficit. GCS score is 15. GCS eye subscore is 4. GCS verbal subscore is 5. GCS motor subscore is 6.   Skin: Skin is warm and dry. Capillary refill takes less than 2 seconds. No rash noted. No erythema.   Psychiatric: She has a normal mood and affect. Thought content normal.       ED Course   Procedures  Labs Reviewed - No data to display       Imaging Results    None          Medications - No data to display  Medical Decision Making:   Initial Assessment:   Patient awake, alert, has non-labored breathing, and follows commands appropriately. Patient arrived to ED due to L shoulder pain. Denies injury/trauma. Full 5/5 ROM noted. NAD noted.   Differential Diagnosis:   Shoulder Pain, Arthritis, Joint Pain   Clinical Tests:   Radiological Study: Ordered  ED Management:  Co-morbidities and/or factors adding to the complexity or risk for the patient?: none  Differential diagnoses: Shoulder Pain, Arthritis, Joint Pain   Decision to obtain previous or outside records?: I did not review records.   Chart Review (nursing home, outside records, CareEverywhere): none  Review of RX medications/new RX prescribed by me?: Prescribed short course of Tylenol #3. Cautioned on side effects.   Labs/imaging/other tests obtained/considered (risk/benefits of testing discussed): Ordered Xray L shoulder. Patient declined imaging.   Labs/tests intepretation: none  My independent imaging interpretation: none  Treatment/interventions, IV fluids, IV medications: none  Complex management (IV controlled substances, went to OR, DNR, meds requiring monitoring, transfer, etc)?: none  Workup/treatment affected by social determinants of health?:none   Point of care US done/interpretation: none  Consults/radiologist/EMS/social work/family discussion/alternate history: none  Advanced care planning/end of life discussion: none  Shared decision making:  Discussed plan of care and interventions with patient. Agreed to and aware of plan of care. Comfortable being discharged home.   ETOH/smoking/drug cessation discussion: none  Dispo: Patient discharged home. Patient denies new or additional complaints; no further tests indicated at this time. Verbalized understanding of instructions. No emergent or apparent distress noted prior to discharge. To follow up with PCP in 1 week as needed. Strict ER return precautions given.                ED Course as of 06/23/23 1243   Fri Jun 23, 2023   1235 Patient declined shoulder xray. Reports she only wants pain medication and does not want to wait 1 hour for results.  [JA]      ED Course User Index  [JA] Lexie Day NP                 Clinical Impression:   Final diagnoses:  [M25.512] Acute pain of left shoulder (Primary)        ED Disposition Condition    Discharge Stable          ED Prescriptions       Medication Sig Dispense Start Date End Date Auth. Provider    acetaminophen-codeine 300-30mg (TYLENOL #3) 300-30 mg Tab Take 1 tablet by mouth every 8 (eight) hours as needed (Take as needed every 8 hours for pain.). 9 tablet 6/23/2023 -- Lexie Day NP          Follow-up Information       Follow up With Specialties Details Why Contact Info    ALE Bhardwaj Nurse Practitioner Call in 1 week If symptoms worsen, As needed [address] [facility]  Internal Medicine Clinic  [facility] [id number]  [phone]      [facility] - Emergency Dept Emergency Medicine Go to  If symptoms worsen, As needed [id number]  [facility] [id number] [phone]             Lexie Day NP  06/23/23 4314

## 2023-06-23 NOTE — DISCHARGE INSTRUCTIONS
¡Rossy por dejarnos cuidar de ti hoy! Nuestro objetivo es brindarle delicia atención garrick y mantenerlo cómodo e informado. Si tiene alguna pregunta antes de irse, estaré encantado de tratar de responderla.    Aquí hay algunos consejos después de bailey visita:      Bailey visita al departamento de emergencias NO es delicia atención definitiva; jovan un seguimiento con bailey médico de atención primaria y/o especialista dentro de 1 semana. Regrese si tiene algún empeoramiento en bailey condición o si tiene alguna otra inquietud.    Si le recetaron MEDICAMENTOS OPIÁCEOS PARA EL DOLOR: comprenda que estos medicamentos pueden ser adictivos, puede surtir menos de la receta para la que se escribió, no tiene que dax la receta completa. Puede desechar lo que no utilice. Busque ayuda si siente que tiene problemas con la adicción. No conduzca ni opere maquinaria pesada si está tomando medicamentos sedantes. No mezcle estos medicamentos con alcohol.

## 2023-06-23 NOTE — ED TRIAGE NOTES
Pt complaint of pain to left upper arm and unable to lift arm that is worsening since yesterday. Pt denies and injury

## 2023-06-28 ENCOUNTER — OFFICE VISIT (OUTPATIENT)
Dept: ORTHOPEDICS | Facility: CLINIC | Age: 78
End: 2023-06-28
Payer: MEDICARE

## 2023-06-28 ENCOUNTER — HOSPITAL ENCOUNTER (OUTPATIENT)
Dept: RADIOLOGY | Facility: CLINIC | Age: 78
Discharge: HOME | End: 2023-06-28
Attending: REHABILITATION UNIT
Payer: MEDICARE

## 2023-06-28 VITALS
WEIGHT: 135 LBS | HEIGHT: 62 IN | BODY MASS INDEX: 24.84 KG/M2 | DIASTOLIC BLOOD PRESSURE: 69 MMHG | HEART RATE: 80 BPM | SYSTOLIC BLOOD PRESSURE: 132 MMHG

## 2023-06-28 DIAGNOSIS — M25.512 ACUTE PAIN OF LEFT SHOULDER: Primary | ICD-10-CM

## 2023-06-28 DIAGNOSIS — M25.512 LEFT SHOULDER PAIN, UNSPECIFIED CHRONICITY: ICD-10-CM

## 2023-06-28 PROCEDURE — 20610 LARGE JOINT ASPIRATION/INJECTION: L SUBACROMIAL BURSA: ICD-10-PCS | Mod: LT,,, | Performed by: REHABILITATION UNIT

## 2023-06-28 PROCEDURE — 99213 PR OFFICE/OUTPT VISIT, EST, LEVL III, 20-29 MIN: ICD-10-PCS | Mod: 25,,, | Performed by: REHABILITATION UNIT

## 2023-06-28 PROCEDURE — 99213 OFFICE O/P EST LOW 20 MIN: CPT | Mod: 25,,, | Performed by: REHABILITATION UNIT

## 2023-06-28 PROCEDURE — 73030 X-RAY EXAM OF SHOULDER: CPT | Mod: LT,,, | Performed by: REHABILITATION UNIT

## 2023-06-28 PROCEDURE — 20610 DRAIN/INJ JOINT/BURSA W/O US: CPT | Mod: LT,,, | Performed by: REHABILITATION UNIT

## 2023-06-28 PROCEDURE — 73030 XR SHOULDER COMPLETE 2 OR MORE VIEWS LEFT: ICD-10-PCS | Mod: LT,,, | Performed by: REHABILITATION UNIT

## 2023-06-28 RX ORDER — LIDOCAINE HYDROCHLORIDE 20 MG/ML
6 INJECTION, SOLUTION INFILTRATION; PERINEURAL
Status: DISCONTINUED | OUTPATIENT
Start: 2023-06-28 | End: 2023-06-28 | Stop reason: HOSPADM

## 2023-06-28 RX ORDER — BETAMETHASONE SODIUM PHOSPHATE AND BETAMETHASONE ACETATE 3; 3 MG/ML; MG/ML
6 INJECTION, SUSPENSION INTRA-ARTICULAR; INTRALESIONAL; INTRAMUSCULAR; SOFT TISSUE
Status: DISCONTINUED | OUTPATIENT
Start: 2023-06-28 | End: 2023-06-28 | Stop reason: HOSPADM

## 2023-06-28 RX ADMIN — LIDOCAINE HYDROCHLORIDE 6 MG: 20 INJECTION, SOLUTION INFILTRATION; PERINEURAL at 10:06

## 2023-06-28 RX ADMIN — BETAMETHASONE SODIUM PHOSPHATE AND BETAMETHASONE ACETATE 6 MG: 3; 3 INJECTION, SUSPENSION INTRA-ARTICULAR; INTRALESIONAL; INTRAMUSCULAR; SOFT TISSUE at 10:06

## 2023-06-28 NOTE — PROGRESS NOTES
Subjective:      Patient ID: Kika Farias is a 77 y.o. female.    Chief Complaint: Pain of the Left Shoulder (Left shoulder pain. Been hurting for 3 days. Pain is severe at times. Taking pain meds that have been helping. Pain radiates down into bicep. Hard for her to lift arm without support. No prior sx.   Translation services were used #[id number])    HPI:   Kika Farias is a 77 y.o. female who presents today for initial evaluation of her left shoulder.  She is Israeli-speaking only and  was used for the entirety of the visit.  She reports that she developed left shoulder pain that started this past Friday.  It was severe.  She denies any traumatic or inciting event.  She ultimately went to the emergency department.  She was prescribed pain medications.  She did not have an x-ray completed of her shoulder as she did not want to wait for the results.  She reports that over these past few days with her pain medication she has seen significant improvement in her shoulder pain and function.  Pain is lateral.  She has a stinging pain to the medial aspect of her upper arm but no sensory or motor changes distally.  I have seen her previously for her right knee.  She was provided with an injection and is doing well from this.    Past Medical History:   Diagnosis Date    GERD (gastroesophageal reflux disease)     Hyperlipidemia     Hypertension     Osteoporosis     Unspecified osteoarthritis, unspecified site      Past Surgical History:   Procedure Laterality Date    TUBAL LIGATION       Social History     Socioeconomic History    Marital status: Single   Tobacco Use    Smoking status: Never    Smokeless tobacco: Never   Substance and Sexual Activity    Alcohol use: Never    Drug use: Never    Sexual activity: Yes     Partners: Male     Birth control/protection: See Surgical Hx     Social Determinants of Health     Transportation Needs: No Transportation Needs    Lack of Transportation (Medical): No    Lack of  "Transportation (Non-Medical): No   Housing Stability: Low Risk     Unable to Pay for Housing in the Last Year: No    Number of Places Lived in the Last Year: 1    Unstable Housing in the Last Year: No         Current Outpatient Medications:     acetaminophen-codeine 300-30mg (TYLENOL #3) 300-30 mg Tab, Take 1 tablet by mouth every 8 (eight) hours as needed (Take as needed every 8 hours for pain.)., Disp: 9 tablet, Rfl: 0    alendronate (FOSAMAX) 70 MG tablet, Take 1 tablet (70 mg total) by mouth every 7 days., Disp: 12 tablet, Rfl: 3    amLODIPine (NORVASC) 5 MG tablet, Take 1 tablet (5 mg total) by mouth once daily., Disp: 90 tablet, Rfl: 3    amoxicillin-clavulanate 875-125mg (AUGMENTIN) 875-125 mg per tablet, Take 1 tablet by mouth 2 (two) times daily. (Patient not taking: Reported on 5/10/2023), Disp: 14 tablet, Rfl: 0    diclofenac sodium (VOLTAREN ARTHRITIS PAIN) 1 % Gel, Apply 2 g topically 3 (three) times daily as needed (pain). (Patient not taking: Reported on 5/10/2023), Disp: 100 g, Rfl: 3    diclofenac sodium (VOLTAREN) 1 % Gel, Apply 2 g topically once daily., Disp: 100 g, Rfl: 0    hydroCHLOROthiazide (HYDRODIURIL) 12.5 MG Tab, Take 1 tablet (12.5 mg total) by mouth once daily., Disp: 90 tablet, Rfl: 3    ibuprofen (ADVIL,MOTRIN) 800 MG tablet, Take 800 mg by mouth every 8 (eight) hours., Disp: , Rfl:     losartan (COZAAR) 100 MG tablet, Take 1 tablet (100 mg total) by mouth once daily., Disp: 90 tablet, Rfl: 3    omeprazole (PRILOSEC) 20 MG capsule, Take 1 capsule (20 mg total) by mouth daily as needed (acid reflux)., Disp: 90 capsule, Rfl: 0    PREMARIN vaginal cream, Place 1 g vaginally once daily., Disp: 30 g, Rfl: 6    simvastatin (ZOCOR) 10 MG tablet, Take 1 tablet (10 mg total) by mouth every evening., Disp: 90 tablet, Rfl: 3  Review of patient's allergies indicates:  No Known Allergies    /69   Pulse 80   Ht 5' 2" (1.575 m)   Wt 61.2 kg (135 lb)   BMI 24.69 kg/m²     Comprehensive " review of systems completed and negative except as per HPI.        Objective:   Head: Normocephalic, without obvious abnormality, atraumatic  Eyes: conjunctivae/corneas clear. EOM's intact  Ears: normal external appearance  Nose: Nares normal. Septum midline. Mucosa normal. No drainage  Throat: normal findings: lips normal without lesions  Lungs: unlabored breathing on room air  Chest wall: symmetric chest rise  Heart: regular rate and rhythm  Pulses: 2+ and symmetric  Skin: Skin color, texture, turgor normal. No rashes or lesions  Neurologic: Grossly normal    left SHOULDER    Appearance:   normal    Cervical Spine:   No ttp; full, painless ROM; negative Spurlings    Tenderness:   Minimal diffusely    AROM:   , Abduction 170, ER 70, IR L2    PROM:  same    Pain:  AROM: Negative  PROM:  Negative  End ROM: Negative  Supraspinatus strength testing: Negative  External rotation strength testing: Negative  Marcie-scapular: Negative  Virtually all provacative maneuvers Negative    Strength:  Supraspinatus: intact  External rotation: intact  Marcie-scapular: intact    Provocative Maneuvers:     Rotator Cuff/Biceps/AC Joint  Neer's Sign: Negative  Hawkin's Test: Positive  Painful arc: Negative  Belly Press: Negative  Bear Hugger Test: Negative  Hornblower's Sign: Negative  Speed's Test: Negative  Yergeson's Test: Negative  Cross Arm Abduction: Negative    Pulses: Palpable radial pulse    Neurological deficits: None    The patient has a warm and well-perfused upper extremity with capillary refill less than 2 seconds. Sensation is intact to light touch in terminal nerve distributions. 5/5 ain/pin/uln. The patient has no palpable epitrochlear lymphadenopathy.      Assessment:     Imaging:   Four view radiographs of the left shoulder obtained today personally reviewed showing no acute fractures or dislocations.  Moderate AC arthritis with no significant glenohumeral arthritis.  Small calcific deposit at the cuff insertion  site.  Humeral head remains seated centrally within the glenoid.    Large Joint Aspiration/Injection: L subacromial bursa    Date/Time: 6/28/2023 10:15 AM  Performed by: Te Delgado MD  Authorized by: Te Delgado MD     Consent Done?:  Yes (Verbal)  Indications:  Pain  Site marked: the procedure site was marked    Timeout: prior to procedure the correct patient, procedure, and site was verified    Prep: patient was prepped and draped in usual sterile fashion    Local anesthesia used?: No      Details:  Needle Size:  22 G  Ultrasonic Guidance for needle placement?: No    Approach:  Posterior  Location:  Shoulder  Site:  L subacromial bursa  Medications:  6 mg LIDOcaine HCL 20 mg/ml (2%) 20 mg/mL (2 %); 6 mg betamethasone acetate-betamethasone sodium phosphate 6 mg/mL  Patient tolerance:  Patient tolerated the procedure well with no immediate complications        1. Acute pain of left shoulder          Plan:       Orders Placed This Encounter    X-Ray Shoulder 2 or More Views Left     Imaging and exam findings discussed with the patient.  She had acute exacerbation of left shoulder pain.  No trauma or inciting event.  She went to the emergency department and was given some pain medication.  Over the next couple days she reports significant improvement in her pain and function.  Today she has good range of motion and strength with minimal pain.  She has some findings of cuff tendinitis/impingement.  We discussed options.  We will provide a steroid injection as above.  She tolerated this well and post-injection care was discussed.  She will resume her activities as able and follow up with me as needed.  She will take anti-inflammatories as medically able.  All of her questions were answered and she was happy and in agreement with the plan.

## 2023-07-11 ENCOUNTER — OFFICE VISIT (OUTPATIENT)
Dept: OPHTHALMOLOGY | Facility: CLINIC | Age: 78
End: 2023-07-11
Payer: MEDICARE

## 2023-07-11 VITALS — WEIGHT: 134.94 LBS | BODY MASS INDEX: 24.83 KG/M2 | HEIGHT: 62 IN

## 2023-07-11 DIAGNOSIS — Z96.1 PSEUDOPHAKIA OF BOTH EYES: ICD-10-CM

## 2023-07-11 DIAGNOSIS — H35.3131 EARLY DRY STAGE NONEXUDATIVE AGE-RELATED MACULAR DEGENERATION OF BOTH EYES: Primary | ICD-10-CM

## 2023-07-11 DIAGNOSIS — H04.123 DRY EYE SYNDROME OF BOTH EYES: ICD-10-CM

## 2023-07-11 PROCEDURE — 92134 CPTRZ OPH DX IMG PST SGM RTA: CPT | Mod: PBBFAC,PO

## 2023-07-11 PROCEDURE — 92134 CPTRZ OPH DX IMG PST SGM RTA: CPT | Mod: PBBFAC,PO | Performed by: OPHTHALMOLOGY

## 2023-07-11 PROCEDURE — 99213 OFFICE O/P EST LOW 20 MIN: CPT | Mod: PBBFAC,PO

## 2023-07-11 NOTE — PROGRESS NOTES
HPI     age-related macular degeneration     Additional comments: OCT MAC, DFE           Dry Eye     Additional comments: Patient states OU burning, tearing, glare bothersome   even with sunglasses, and mild pain all of the time. Using AFT OU x 6   times a day. Patient states that ointment that we gave her at last visit   helped but she is out now.            Comments    Early dry stage nonexudative age-related macular degeneration, eyes dry           Last edited by Emily Thompson MA on 7/11/2023  9:32 AM.            Assessment /Plan     For exam results, see Encounter Report.    There are no diagnoses linked to this encounter.      OCT MAC 07/11/2023  OD: , intact foveal contour, no IRF/SRF  OS: , intact foveal contour, disruption of the OS junction subfoveally       PRIOR TESTING    OCT mac 9/30/2022  OD:218 Good foveal contour with few drusen is os intact  OS: 202 Good foveal contour with few drusen; is os disruption at fovea  OCT mac 10/2021  OD:213 Good foveal contour with few drusen is os intact  OS: 206 Good foveal contour with few drusen; is os disruption at fovea    MRX 3/2021  -0.5 +2.50 x 6  -2.25 +3.25 x175  Add: +250    imer 3/30/21  OD: Flat SimK 40.99, steep simK 41.89, astig 1.8D at 6  OS; Flat SimK 40.22, Steep simK 42.44, astig 2.23D at 165     Assessment/Plan   1) PSK, OU  - most recently s/p CE/IOL OS (9/24/2020)  - Likely residual astigmatism ( Patient decided to get basic lens instead of toric lens offered )  - difficult MRx previously, so imer obtained 3/2021. With residual astigmatism OU  - Patient happy with current glasses and declines new refraction  - Pt w trace PCO, likely not affecting vision. monitor    2. KIKI 2/2 blephritis OU and lower eyleid laxity OU  - artificial tears 4-6 x/daily, WC, LS  - start nighttime denise    3. ARMD OU, mild  - Amsler grid daily  - No smoking, good diet and exercise encouraged  - OCT mac stable with no IRF/SRF  - annual DFE     4.  Blepharoptosis OU  - not interested in surgery at this time, consider ptosis field in future    RTC 6 months OCT mac

## 2023-09-11 ENCOUNTER — OFFICE VISIT (OUTPATIENT)
Dept: GYNECOLOGY | Facility: CLINIC | Age: 78
End: 2023-09-11
Payer: MEDICARE

## 2023-09-11 VITALS
HEART RATE: 76 BPM | HEIGHT: 62 IN | TEMPERATURE: 98 F | SYSTOLIC BLOOD PRESSURE: 115 MMHG | DIASTOLIC BLOOD PRESSURE: 65 MMHG | BODY MASS INDEX: 24.73 KG/M2 | RESPIRATION RATE: 20 BRPM | WEIGHT: 134.38 LBS | OXYGEN SATURATION: 100 %

## 2023-09-11 DIAGNOSIS — Z12.31 ENCOUNTER FOR SCREENING MAMMOGRAM FOR MALIGNANT NEOPLASM OF BREAST: ICD-10-CM

## 2023-09-11 DIAGNOSIS — N89.8 VAGINAL DISCHARGE: Primary | ICD-10-CM

## 2023-09-11 DIAGNOSIS — Z12.39 ENCOUNTER FOR BREAST CANCER SCREENING OTHER THAN MAMMOGRAM: ICD-10-CM

## 2023-09-11 LAB
CLUE CELLS VAG QL WET PREP: ABNORMAL
T VAGINALIS VAG QL WET PREP: ABNORMAL
WBC #/AREA VAG WET PREP: ABNORMAL
YEAST SPEC QL WET PREP: ABNORMAL

## 2023-09-11 PROCEDURE — 99214 OFFICE O/P EST MOD 30 MIN: CPT | Mod: PBBFAC

## 2023-09-11 PROCEDURE — 87210 SMEAR WET MOUNT SALINE/INK: CPT

## 2023-09-11 NOTE — PROGRESS NOTES
[facility] OB/GYN CLINIC NOTE  OU  [address]  [place] [address]  Phone: [phone]  Fax: [phone]    Subjective:     Kika Farias is a 76 y.o.  with Stage III Ba prolapse managed with a #2 3/4 Gellhorn pessary. She was last seen 3/2023 with no issues and presents today for a pessary check.    Patient is satisfied with the pessary. She has noted an odor and white discharge recently. She voids comfortable at home, denies incomplete emptying or symptoms of bladder outlet obstruction. Denies JAYSON. Denies vaginal bleeding. She is compliant with vaginal estrogen.     Allergies: NKDA    MedHx:   Past Medical History:   Diagnosis Date    GERD (gastroesophageal reflux disease)     Hyperlipidemia     Hypertension     Osteoporosis     Unspecified osteoarthritis, unspecified site      SurgHx:   Past Surgical History:   Procedure Laterality Date    TUBAL LIGATION       Medications:     Current Outpatient Medications:     alendronate (FOSAMAX) 70 MG tablet, Take 1 tablet (70 mg total) by mouth every 7 days., Disp: 12 tablet, Rfl: 3    amLODIPine (NORVASC) 5 MG tablet, Take 1 tablet (5 mg total) by mouth once daily., Disp: 90 tablet, Rfl: 3    hydroCHLOROthiazide (HYDRODIURIL) 12.5 MG Tab, Take 1 tablet (12.5 mg total) by mouth once daily., Disp: 90 tablet, Rfl: 3    ibuprofen (ADVIL,MOTRIN) 800 MG tablet, Take 800 mg by mouth every 8 (eight) hours., Disp: , Rfl:     losartan (COZAAR) 100 MG tablet, Take 1 tablet (100 mg total) by mouth once daily., Disp: 90 tablet, Rfl: 3    omeprazole (PRILOSEC) 20 MG capsule, Take 1 capsule (20 mg total) by mouth daily as needed (acid reflux)., Disp: 90 capsule, Rfl: 0    PREMARIN vaginal cream, Place 1 g vaginally once daily., Disp: 30 g, Rfl: 6    simvastatin (ZOCOR) 10 MG tablet, Take 1 tablet (10 mg total) by mouth every evening., Disp: 90 tablet, Rfl: 3    acetaminophen-codeine 300-30mg (TYLENOL #3) 300-30 mg Tab, Take 1 tablet by mouth every 8 (eight) hours as needed  (Take as needed every 8 hours for pain.). (Patient not taking: Reported on 9/11/2023), Disp: 9 tablet, Rfl: 0    amoxicillin-clavulanate 875-125mg (AUGMENTIN) 875-125 mg per tablet, Take 1 tablet by mouth 2 (two) times daily. (Patient not taking: Reported on 5/10/2023), Disp: 14 tablet, Rfl: 0    diclofenac sodium (VOLTAREN ARTHRITIS PAIN) 1 % Gel, Apply 2 g topically 3 (three) times daily as needed (pain). (Patient not taking: Reported on 5/10/2023), Disp: 100 g, Rfl: 3    diclofenac sodium (VOLTAREN) 1 % Gel, Apply 2 g topically once daily. (Patient not taking: Reported on 9/11/2023), Disp: 100 g, Rfl: 0    FM Hx: Denies hx of ovarian, uterine, endometrial, or colon cancer. Denies history of bleeding or coagulation disorders.  History reviewed. No pertinent family history.    Social Hx: Denies tobacco, alcohol and illicit drug usage.  Social History     Socioeconomic History    Marital status: Single   Tobacco Use    Smoking status: Never    Smokeless tobacco: Never   Substance and Sexual Activity    Alcohol use: Never    Drug use: Never    Sexual activity: Yes     Partners: Male     Birth control/protection: See Surgical Hx     Social Determinants of Health     Transportation Needs: No Transportation Needs (10/26/2022)    PRAPARE - Transportation     Lack of Transportation (Medical): No     Lack of Transportation (Non-Medical): No   Housing Stability: Low Risk  (10/26/2022)    Housing Stability Vital Sign     Unable to Pay for Housing in the Last Year: No     Number of Places Lived in the Last Year: 1     Unstable Housing in the Last Year: No       Review of Systems  Denies fevers, chills, headache, blurry vision, nausea, vomiting, dizziness, or syncope.   Denies chest pain, shortness of breath, RUQ pain, or calf pain.    Objective:     Vitals:    09/11/23 0850   BP: 115/65   BP Location: Right forearm   Patient Position: Sitting   BP Method: Medium (Automatic)   Pulse: 76   Resp: 20   Temp: 98.1 °F (36.7 °C)  "  TempSrc: Oral   SpO2: 100%   Weight: 61 kg (134 lb 6.4 oz)   Height: 5' 2" (1.575 m)     Body mass index is 24.58 kg/m².    Physical Exam:     General: alert and oriented, in no acute distress  Lungs: clear to auscultation bilaterally, no conversational dyspnea  Heart: regular rate and rhythm  Abdomen: Soft, non-distended, non tender to palpation, no involuntary guarding, no rebound tenderness  Extremities: Normal, atraumatic, non-edematous, No cords or calf tenderness, No significant calf/ankle edema  External genitalia: Normal female genitalia for age and menstrual status without lesion, discharge or tenderness. Normal appearing urethral meatus. Normal appearing external anus.  Bimanual Exam: No inguinal lymphadenopathy noted bilaterally. Vagina with adequate capacity. No adnexal fullness/tenderness. Normal urethra. Normal bladder  Speculum Exam: Vaginal mucosa normal in appearance for age and menstrual status, pale and thin with loss of rugae however no excoriations or fissures. No masses/lesions. Cervix well visualized, smooth in contour. Small 1cm cervical polyp noted on posterior cervix, not friable or bleeding, regular appearing. Os normal in appearance, no blood or discharge coming from the os    Note: RN chaperone present for entirety of exam.     Imaging  No images are attached to the encounter.  Assessment/Plan:    Kika Farias is a 77 y.o.  with Stage III Ba prolapse managed with a #2 3/4 Gellhorn pessary.    Pelvic organ prolapse:  Happy with current management  Exam reassuring  Continue vaginal estrogen cream, refill sent  Encourage patient to use estrogen 2-3 nights per week  Health maintenance  Pap: N/A, age > 65 with history of normal Pap smears  Mammogram 2022: Results were BIRADS 1  Bone density: DXA 10/2022 with evidence of known osteoporosis    RTC in 6 months for pessary check.    Future Appointments   Date Time Provider Department Center   2023  7:00 AM Lian Hill FNP " [facility] INTMED [place]   1/19/2024  8:30 AM PROVID[facility], [facility] [place]   3/11/2024  9:45 AM RESIDENTS, JO ANN GYN [facility] GYN [place]   9/12/2024  8:30 AM Zuri Darden, ANP [facility] GYN [facility]       Patient and plan were discussed with Dr. Kimbrough.    Keyana Oropeza MD MPH   [facility], PGY-3\

## 2023-09-21 ENCOUNTER — OFFICE VISIT (OUTPATIENT)
Dept: INTERNAL MEDICINE | Facility: CLINIC | Age: 78
End: 2023-09-21
Payer: MEDICARE

## 2023-09-21 VITALS
BODY MASS INDEX: 24.44 KG/M2 | RESPIRATION RATE: 20 BRPM | HEART RATE: 81 BPM | DIASTOLIC BLOOD PRESSURE: 70 MMHG | WEIGHT: 132.81 LBS | SYSTOLIC BLOOD PRESSURE: 142 MMHG | HEIGHT: 62 IN | TEMPERATURE: 99 F

## 2023-09-21 DIAGNOSIS — M81.0 AGE-RELATED OSTEOPOROSIS WITHOUT CURRENT PATHOLOGICAL FRACTURE: Chronic | ICD-10-CM

## 2023-09-21 DIAGNOSIS — I10 PRIMARY HYPERTENSION: Chronic | ICD-10-CM

## 2023-09-21 DIAGNOSIS — R73.03 PREDIABETES: Chronic | ICD-10-CM

## 2023-09-21 DIAGNOSIS — Z23 NEED FOR VACCINATION: Primary | ICD-10-CM

## 2023-09-21 DIAGNOSIS — K21.9 GASTROESOPHAGEAL REFLUX DISEASE WITHOUT ESOPHAGITIS: ICD-10-CM

## 2023-09-21 DIAGNOSIS — E78.5 HYPERLIPIDEMIA, UNSPECIFIED HYPERLIPIDEMIA TYPE: ICD-10-CM

## 2023-09-21 DIAGNOSIS — E78.2 MIXED HYPERLIPIDEMIA: Chronic | ICD-10-CM

## 2023-09-21 LAB — HBA1C MFR BLD: 6.1 %

## 2023-09-21 PROCEDURE — 99214 PR OFFICE/OUTPT VISIT, EST, LEVL IV, 30-39 MIN: ICD-10-PCS | Mod: S$PBB,,, | Performed by: NURSE PRACTITIONER

## 2023-09-21 PROCEDURE — 83036 HEMOGLOBIN GLYCOSYLATED A1C: CPT | Mod: PBBFAC | Performed by: NURSE PRACTITIONER

## 2023-09-21 PROCEDURE — 99214 OFFICE O/P EST MOD 30 MIN: CPT | Mod: S$PBB,,, | Performed by: NURSE PRACTITIONER

## 2023-09-21 PROCEDURE — 99214 OFFICE O/P EST MOD 30 MIN: CPT | Mod: PBBFAC | Performed by: NURSE PRACTITIONER

## 2023-09-21 PROCEDURE — 90662 IIV NO PRSV INCREASED AG IM: CPT | Mod: PBBFAC

## 2023-09-21 RX ORDER — CALCIUM CARBONATE 600 MG
600 TABLET ORAL ONCE
COMMUNITY

## 2023-09-21 RX ORDER — SIMVASTATIN 10 MG/1
10 TABLET, FILM COATED ORAL NIGHTLY
Qty: 90 TABLET | Refills: 3 | Status: SHIPPED | OUTPATIENT
Start: 2023-09-21

## 2023-09-21 RX ORDER — OMEPRAZOLE 20 MG/1
20 CAPSULE, DELAYED RELEASE ORAL DAILY PRN
Qty: 90 CAPSULE | Refills: 0 | Status: SHIPPED | OUTPATIENT
Start: 2023-09-21

## 2023-09-21 NOTE — PROGRESS NOTES
Lian Hill, ALE   [facility]  [address] [facility] [address]      PATIENT NAME: Kika Farias  : 1945  DATE: 23  MRN: [id number]      Reason for Visit / Chief Complaint: Follow-up (Refills, wants flu vaccine)       History of Present Illness / Problem Focused Workflow     Kika Farias is a 77 y.o. [place] female presents to the clinic for HTN and prediabetes f/u. PMH HTN, HLD, prediabetes, macular degeneration, osteoporosis, left knee OA, uterine prolapse and ureteral carbuncle. Pt followed by [facility] GYN, audiology and optho clinics.     , Alix, #[id number] used during entire visit. Since last visit, pt was seen in ED for left shoulder pain and was referred to ortho. She was seen and evaluated and received a CSI with significant improvement. Today, pt presents for f/u. BP borderline; did not take meds as she wanted to see what the reading would be. Reports daily med compliance. Attempts low sodium diet. Reports intermittent gas and bloating, associated with certain foods. Amendable to flu vaccine today. Denies chest pain, shortness of breath, cough, fever, headache, dizziness, weakness, abdominal pain, nausea, vomiting, diarrhea, constipation, dysuria, depression, anxiety, SI/HI.    Review of Systems     Review of Systems     See HPI for details    Constitutional: Denies Change in appetite. Denies Chills. Denies Fever. Denies Night sweats.  Eye: Denies Blurred vision. Denies Discharge. Denies Eye pain.  ENT: Denies Decreased hearing. Denies Sore throat. Denies Swollen glands.  Respiratory: Denies Cough. Denies Shortness of breath. Denies Shortness of breath with exertion. Denies Wheezing.  Cardiovascular: Denies Chest pain at rest. Denies Chest pain with exertion. Denies Irregular heartbeat. Denies Palpitations. Denies Edema.  Gastrointestinal: Denies Abdominal pain. Denies Diarrhea. Denies Nausea. Denies Vomiting.  Denies Hematemesis or Hematochezia.  Genitourinary: Denies Dysuria. Denies Urinary frequency. Denies Urinary urgency. Denies Blood in urine.  Endocrine: Denies Cold intolerance. Denies Excessive thirst. Denies Heat intolerance. Denies Weight loss. Denies Weight gain.  Musculoskeletal: Denies Painful joints. Denies Weakness.  Integumentary: Denies Rash. Denies Itching. Denies Dry skin.  Neurologic: Denies Dizziness. Denies Fainting. Denies Headache.  Psychiatric: Denies Depression. Denies Anxiety. Denies Suicidal/Homicidal ideations.    All Other ROS: Negative except as stated in HPI.     Medical / Surgical / Social / Family History       ----------------------------  GERD (gastroesophageal reflux disease)  Hyperlipidemia  Hypertension  Osteoporosis  Unspecified osteoarthritis, unspecified site     Past Surgical History:   Procedure Laterality Date    TUBAL LIGATION         Social History     Socioeconomic History    Marital status: Single   Tobacco Use    Smoking status: Never     Passive exposure: Never    Smokeless tobacco: Never   Substance and Sexual Activity    Alcohol use: Never    Drug use: Never    Sexual activity: Yes     Partners: Male     Birth control/protection: See Surgical Hx     Social Determinants of Health     Food Insecurity: No Food Insecurity (9/21/2023)    Hunger Vital Sign     Worried About Running Out of Food in the Last Year: Never true     Ran Out of Food in the Last Year: Never true   Transportation Needs: No Transportation Needs (10/26/2022)    PRAPARE - Transportation     Lack of Transportation (Medical): No     Lack of Transportation (Non-Medical): No   Stress: No Stress Concern Present (9/21/2023)    [employer] [place] [employer] - Occupational Stress Questionnaire     Feeling of Stress : Not at all   Housing Stability: Low Risk  (10/26/2022)    Housing Stability Vital Sign     Unable to Pay for Housing in the Last Year: No     Number of Places Lived in the Last Year: 1      "Unstable Housing in the Last Year: No        History reviewed. No pertinent family history.     Medications and Allergies     Medications  Current Outpatient Medications   Medication Instructions    alendronate (FOSAMAX) 70 mg, Oral, Every 7 days    amLODIPine (NORVASC) 5 mg, Oral, Daily    calcium carbonate (OS-GERALDINE) 600 mg, Oral, Once    collagen/biotin/ascorbic acid (COLLAGEN 1500 PLUS C ORAL) Oral    hydroCHLOROthiazide (HYDRODIURIL) 12.5 mg, Oral, Daily    ibuprofen (ADVIL,MOTRIN) 800 mg, Oral, Every 8 hours    losartan (COZAAR) 100 mg, Oral, Daily    omeprazole (PRILOSEC) 20 mg, Oral, Daily PRN    PREMARIN 1 g, Vaginal, Daily    simvastatin (ZOCOR) 10 mg, Oral, Nightly         Allergies  Review of patient's allergies indicates:  No Known Allergies    Physical Examination     BP (!) 142/70 (BP Location: Right arm, Patient Position: Sitting, BP Method: Medium (Manual))   Pulse 81   Temp 98.7 °F (37.1 °C) (Oral)   Resp 20   Ht 5' 2.01" (1.575 m)   Wt 60.2 kg (132 lb 12.8 oz)   BMI 24.28 kg/m²     Physical Exam    General: Alert and oriented, No acute distress.  Head: Normocephalic, Atraumatic.  Eye: Pupils are equal, round and reactive to light, Extraocular movements are intact, Sclera non-icteric.  Ears/Nose/Throat: Normal, Mucosa moist,Clear.  Neck/Thyroid: Supple, Non-tender, No carotid bruit, No lymphadenopathy, No JVD, Full range of motion.  Respiratory: Clear to auscultation bilaterally; No wheezes, rales or rhonchi,Non-labored respirations, Symmetrical chest wall expansion.  Cardiovascular: Regular rate and rhythm, S1/S2 normal, No murmurs, rubs or gallops.  Gastrointestinal: Soft, Non-tender, Non-distended, Normal bowel sounds, No palpable organomegaly.  Musculoskeletal: Normal range of motion.  Integumentary: Warm, Dry, Intact, No suspicious lesions or rashes.  Extremities: No clubbing, cyanosis or edema  Neurologic: No focal deficits, Cranial Nerves II-XII are grossly intact, Motor strength normal " upper and lower extremities, Sensory exam intact.  Psychiatric: Normal interaction, Coherent speech, Appropriate affect       Results     Lab Results   Component Value Date    WBC 7.1 11/16/2022    HGB 13.2 11/16/2022    HCT 39.0 11/16/2022     11/16/2022    CHOL 191 11/16/2022    TRIG 81 11/16/2022    ALT 11 11/16/2022    AST 19 11/16/2022     (L) 11/16/2022    K 4.6 11/16/2022    CREATININE 0.78 11/16/2022    BUN 12.3 11/16/2022    CO2 29 11/16/2022    TSH 1.5322 11/16/2022    INR 0.92 11/29/2021    HGBA1C 5.8 11/16/2022         Assessment and Plan (including Health Maintenance)     Plan:     1. Prediabetes  POC A1C 6.1 at goal. Previous A1C 5.8  Desires continued monitoring with dietary modifications.  Avoid soda, simple sweets, and limit rice/pasta/bread/starches and consume brown options when possible.   Maintain healthy weight with BMI goal <30.   Perform aerobic exercise for 150 minutes per week (or 5 days a week for 30 minutes each day).   Examine feet daily.   - Hemoglobin A1C; Future  - Lipid Panel; Future  - Microalbumin/Creatinine Ratio, Urine; Future  - Urinalysis, Reflex to Urine Culture; Future  - POCT HEMOGLOBIN A1C    2. Primary hypertension  Borderline; did not take meds this am.  Continue losartan, HCTZ and amlodipine.  Follow a low sodium (less than 2 grams of sodium per day), DASH diet.   Continue medications as prescribed.  Monitor blood pressure and report any consistent values greater than 140/90 and keep a log.  Maintain healthy weight with a BMI goal of <30.   Aerobic exercise for 150 minutes per week (or 5 days a week for 30 minutes each day).  - CBC Auto Differential; Future  - Comprehensive Metabolic Panel; Future  - Hemoglobin A1C; Future  - Lipid Panel; Future  - Microalbumin/Creatinine Ratio, Urine; Future  - TSH; Future  - Urinalysis, Reflex to Urine Culture; Future    3. Age-related osteoporosis without current pathological fracture  Continue Calcium 600mg twice per day  and Vitamin D 2000IU daily. Continue fosamax.  Take meds as prescribed.  Maintain a healthy BMI of <30.  Weight bearing exercise such as walking or resistance training to build bones 5 times a week.  Perform strengthening, range of motion and low-impact aerobic exercises (walking, water aerobics, cycling) as recommended to control pain and improve joint function.  Avoid soda and excessive alcohol.  Use assistive devices as needed for ambulation and fall prevention.  Avoid falls by assessing home for loose cords and rugs, wearing proper footwear, and using proper lighting in rooms.  Repeat DEXA in 11/2024.    4. Need for vaccination  - Influenza - Quadrivalent - High Dose (65+) (PF) (IM)    5. GERD  Refilled protonix prn.  Avoid spicy, acidic, fried food and alcohol.   Eat 2-3 hours before bed.  Avoid tight fitting clothes and chew food thoroughly.   Reduce caffeine intake, avoid soda.   Take meds as prescribed.       Problem List Items Addressed This Visit          Cardiac/Vascular    Hyperlipidemia (Chronic)    Relevant Medications    simvastatin (ZOCOR) 10 MG tablet    Other Relevant Orders    Lipid Panel    Hypertension (Chronic)    Relevant Orders    CBC Auto Differential    Comprehensive Metabolic Panel    Hemoglobin A1C    Lipid Panel    Microalbumin/Creatinine Ratio, Urine    TSH    Urinalysis, Reflex to Urine Culture       Endocrine    Osteoporosis (Chronic)    Prediabetes (Chronic)    Relevant Orders    Hemoglobin A1C    Lipid Panel    Microalbumin/Creatinine Ratio, Urine    Urinalysis, Reflex to Urine Culture    POCT HEMOGLOBIN A1C (Completed)       GI    Gastroesophageal reflux disease (Chronic)    Relevant Medications    omeprazole (PRILOSEC) 20 MG capsule     Other Visit Diagnoses       Need for vaccination    -  Primary    Relevant Orders    Influenza - Quadrivalent - High Dose (65+) (PF) (IM) (Completed)              Health Maintenance Due   Topic Date Due    Hepatitis C Screening  Never done     COVID-19 Vaccine (4 - Pfizer series) 04/14/2022       Follow up in about 5 months (around 2/21/2024) for Follow up, Lab Results.        Signature:  ALE Galan  [facility]  [address] [address]

## 2023-10-19 ENCOUNTER — OFFICE VISIT (OUTPATIENT)
Dept: INTERNAL MEDICINE | Facility: CLINIC | Age: 78
End: 2023-10-19
Payer: MEDICARE

## 2023-10-19 VITALS
WEIGHT: 130 LBS | SYSTOLIC BLOOD PRESSURE: 129 MMHG | HEIGHT: 62 IN | TEMPERATURE: 98 F | HEART RATE: 76 BPM | RESPIRATION RATE: 20 BRPM | DIASTOLIC BLOOD PRESSURE: 67 MMHG | BODY MASS INDEX: 23.92 KG/M2

## 2023-10-19 DIAGNOSIS — G47.09 OTHER INSOMNIA: ICD-10-CM

## 2023-10-19 DIAGNOSIS — I10 PRIMARY HYPERTENSION: Primary | Chronic | ICD-10-CM

## 2023-10-19 PROCEDURE — 99214 OFFICE O/P EST MOD 30 MIN: CPT | Mod: S$PBB,,, | Performed by: NURSE PRACTITIONER

## 2023-10-19 PROCEDURE — 99214 OFFICE O/P EST MOD 30 MIN: CPT | Mod: PBBFAC | Performed by: NURSE PRACTITIONER

## 2023-10-19 PROCEDURE — 99214 PR OFFICE/OUTPT VISIT, EST, LEVL IV, 30-39 MIN: ICD-10-PCS | Mod: S$PBB,,, | Performed by: NURSE PRACTITIONER

## 2023-10-19 RX ORDER — HYDROXYZINE PAMOATE 25 MG/1
25 CAPSULE ORAL NIGHTLY PRN
Qty: 15 CAPSULE | Refills: 0 | Status: SHIPPED | OUTPATIENT
Start: 2023-10-19

## 2023-10-19 NOTE — PROGRESS NOTES
Lian Hill, ALE   [facility]  [address] [facility] [address]      PATIENT NAME: Kika Farias  : 1945  DATE: 10/19/23  MRN: [id number]      Reason for Visit / Chief Complaint: Hypertension       History of Present Illness / Problem Focused Workflow     Kika Farias is a 77 y.o. [facility] female presents to the clinic for HTN f/u. PMH HTN, HLD, prediabetes, macular degeneration, osteoporosis, left knee OA, uterine prolapse and ureteral carbuncle. Pt followed by [facility] GYN, audiology and optho clinics.    Hypertension: Patient here for follow-up of elevated blood pressure. She is not exercising and is adherent to low salt diet.  Blood pressure is well controlled at home. Cardiac symptoms chest pain, chest pressure/discomfort, claudication, dyspnea, exertional chest pressure/discomfort, fatigue, irregular heart beat, lower extremity edema, near-syncope, orthopnea, palpitations, paroxysmal nocturnal dyspnea, syncope, and tachypnea. Patient denies none.  Cardiovascular risk factors: advanced age (older than 55 for men, 65 for women), dyslipidemia, and hypertension. Use of agents associated with hypertension: none. History of target organ damage: none. Reports med compliance. Will go home to [place] early Dec - early Feb. States is not sleeping well d/t waking up multiple times nightly worrying about her son who has health problems. Is concerned that she cannot help him. Denies chest pain, shortness of breath, cough, fever, headache, dizziness, weakness, abdominal pain, nausea, vomiting, diarrhea, constipation, dysuria, depression, anxiety, SI/HI.      , Prudence, #[id number], used during visit today.       Review of Systems     Review of Systems     See HPI for details    Constitutional: Denies Change in appetite. Denies Chills. Denies Fever. Denies Night sweats.  Eye: Denies Blurred vision. Denies Discharge. Denies Eye pain.  ENT:  Denies Decreased hearing. Denies Sore throat. Denies Swollen glands.  Respiratory: Denies Cough. Denies Shortness of breath. Denies Shortness of breath with exertion. Denies Wheezing.  Cardiovascular: Denies Chest pain at rest. Denies Chest pain with exertion. Denies Irregular heartbeat. Denies Palpitations. Denies Edema.  Gastrointestinal: Denies Abdominal pain. Denies Diarrhea. Denies Nausea. Denies Vomiting. Denies Hematemesis or Hematochezia.  Genitourinary: Denies Dysuria. Denies Urinary frequency. Denies Urinary urgency. Denies Blood in urine.  Endocrine: Denies Cold intolerance. Denies Excessive thirst. Denies Heat intolerance. Denies Weight loss. Denies Weight gain.  Musculoskeletal: Denies Painful joints. Denies Weakness.  Integumentary: Denies Rash. Denies Itching. Denies Dry skin.  Neurologic: Denies Dizziness. Denies Fainting. Denies Headache.  Psychiatric: Denies Depression. Denies Anxiety. Denies Suicidal/Homicidal ideations.    All Other ROS: Negative except as stated in HPI.     Medical / Surgical / Social / Family History       ----------------------------  GERD (gastroesophageal reflux disease)  Hyperlipidemia  Hypertension  Osteoporosis  Unspecified osteoarthritis, unspecified site     Past Surgical History:   Procedure Laterality Date    TUBAL LIGATION         Social History     Socioeconomic History    Marital status: Single   Tobacco Use    Smoking status: Never     Passive exposure: Never    Smokeless tobacco: Never   Substance and Sexual Activity    Alcohol use: Never    Drug use: Never    Sexual activity: Yes     Partners: Male     Birth control/protection: See Surgical Hx     Social Determinants of Health     Food Insecurity: No Food Insecurity (9/21/2023)    Hunger Vital Sign     Worried About Running Out of Food in the Last Year: Never true     Ran Out of Food in the Last Year: Never true   Transportation Needs: No Transportation Needs (10/26/2022)    PRAPARE - Transportation     Lack of  "Transportation (Medical): No     Lack of Transportation (Non-Medical): No   Stress: No Stress Concern Present (9/21/2023)    [employer] [place] [employer]ational [employer] - Occupational Stress Questionnaire     Feeling of Stress : Not at all   Social Connections: Moderately Integrated (10/19/2023)    Social Connection and Isolation Panel [NHANES]     Frequency of Communication with Friends and Family: Three times a week     Frequency of Social Gatherings with Friends and Family: Twice a week     Attends [employer] Services: 1 to 4 times per year     Active Member of Clubs or Organizations: No     Attends Club or Organization Meetings: Never     Marital Status:    Housing Stability: Low Risk  (10/26/2022)    Housing Stability Vital Sign     Unable to Pay for Housing in the Last Year: No     Number of Places Lived in the Last Year: 1     Unstable Housing in the Last Year: No        History reviewed. No pertinent family history.     Medications and Allergies     Medications  Current Outpatient Medications   Medication Instructions    alendronate (FOSAMAX) 70 mg, Oral, Every 7 days    amLODIPine (NORVASC) 5 mg, Oral, Daily    calcium carbonate (OS-GERALDINE) 600 mg, Oral, Once    collagen/biotin/ascorbic acid (COLLAGEN 1500 PLUS C ORAL) Oral    hydroCHLOROthiazide (HYDRODIURIL) 12.5 mg, Oral, Daily    hydrOXYzine pamoate (VISTARIL) 25 mg, Oral, Nightly PRN    ibuprofen (ADVIL,MOTRIN) 800 mg, Oral, Every 8 hours    losartan (COZAAR) 100 mg, Oral, Daily    omeprazole (PRILOSEC) 20 mg, Oral, Daily PRN    PREMARIN 1 g, Vaginal, Daily    simvastatin (ZOCOR) 10 mg, Oral, Nightly         Allergies  Review of patient's allergies indicates:  No Known Allergies    Physical Examination     /67 (BP Location: Right arm, Patient Position: Sitting, BP Method: Medium (Automatic))   Pulse 76   Temp 98.1 °F (36.7 °C) (Oral)   Resp 20   Ht 5' 2.01" (1.575 m)   Wt 59 kg (130 lb)   BMI 23.77 kg/m²     Physical Exam    General: Alert " and oriented, No acute distress.  Head: Normocephalic, Atraumatic.  Eye: Pupils are equal, round and reactive to light, Extraocular movements are intact, Sclera non-icteric.  Ears/Nose/Throat: Normal, Mucosa moist,Clear.  Neck/Thyroid: Supple, Non-tender, No carotid bruit, No lymphadenopathy, No JVD, Full range of motion.  Respiratory: Clear to auscultation bilaterally; No wheezes, rales or rhonchi,Non-labored respirations, Symmetrical chest wall expansion.  Cardiovascular: Regular rate and rhythm, S1/S2 normal, No murmurs, rubs or gallops.  Gastrointestinal: Soft, Non-tender, Non-distended, Normal bowel sounds, No palpable organomegaly.  Musculoskeletal: Normal range of motion.  Integumentary: Warm, Dry, Intact, No suspicious lesions or rashes.  Extremities: No clubbing, cyanosis or edema  Neurologic: No focal deficits, Cranial Nerves II-XII are grossly intact, Motor strength normal upper and lower extremities, Sensory exam intact.  Psychiatric: Normal interaction, Coherent speech, Appropriate affect       Results     Lab Results   Component Value Date    WBC 7.1 11/16/2022    HGB 13.2 11/16/2022    HCT 39.0 11/16/2022     11/16/2022    CHOL 191 11/16/2022    TRIG 81 11/16/2022    ALT 11 11/16/2022    AST 19 11/16/2022     (L) 11/16/2022    K 4.6 11/16/2022    CREATININE 0.78 11/16/2022    BUN 12.3 11/16/2022    CO2 29 11/16/2022    TSH 1.5322 11/16/2022    INR 0.92 11/29/2021    HGBA1C 5.8 11/16/2022         Assessment and Plan (including Health Maintenance)     Plan:     1. Primary hypertension  At goal.  Continue losartan, HCTZ and amlodipine.  Follow a low sodium (less than 2 grams of sodium per day), DASH diet.   Continue medications as prescribed.  Monitor blood pressure and report any consistent values greater than 140/90 and keep a log.  Maintain healthy weight with a BMI goal of <30.   Aerobic exercise for 150 minutes per week (or 5 days a week for 30 minutes each day).    2. Insomnia  Rx  vistaril prn at bedtime.  Avoid caffeine, alcohol and stimulants.  Do not use illicit drugs.  Practice positive phrases and repeat throughout the day, yoga, lavender scents or Chamomile tea to promote relaxation.  Set healthy boundaries, avoid people and conversations that increase stress.  Power down electronic devices at least one hour prior to bedtime.  Keep room dark; use eye mask or relaxation sound machine to promote rest.  Sleep hygiene refers to actions that tend to improve and maintain good sleep:  Sleep as long as necessary to feel rested (usually seven to eight hours for adults) and then get out of bed  Maintain a regular sleep schedule, particularly a regular wake-up time in the morning  Avoid smoking or other nicotine intake, particularly during the evening  Avoid daytime naps, especially if they are longer than 20 to 30 minutes or occur late in the day.    Problem List Items Addressed This Visit          Cardiac/Vascular    Hypertension - Primary (Chronic)       Other    Other insomnia         Health Maintenance Due   Topic Date Due    Hepatitis C Screening  Never done    COVID-19 Vaccine (4 - 2023-24 season) 09/01/2023     RTC 3-4 weeks f/u insomnia.  Follow up in about 4 months (around 2/19/2024) for Follow up, Lab Results, Prediabetes, HTN.        Signature:  ALE Galan  [facility]  [address] [address]

## 2023-11-30 ENCOUNTER — OFFICE VISIT (OUTPATIENT)
Dept: OPHTHALMOLOGY | Facility: CLINIC | Age: 78
End: 2023-11-30
Payer: MEDICARE

## 2023-11-30 VITALS — WEIGHT: 130 LBS | BODY MASS INDEX: 23.92 KG/M2 | HEIGHT: 62 IN

## 2023-11-30 DIAGNOSIS — Z96.1 PSEUDOPHAKIA OF BOTH EYES: ICD-10-CM

## 2023-11-30 DIAGNOSIS — H04.123 DRY EYE SYNDROME OF BOTH EYES: ICD-10-CM

## 2023-11-30 DIAGNOSIS — H35.3131 EARLY DRY STAGE NONEXUDATIVE AGE-RELATED MACULAR DEGENERATION OF BOTH EYES: Primary | ICD-10-CM

## 2023-11-30 PROCEDURE — 92134 CPTRZ OPH DX IMG PST SGM RTA: CPT | Mod: PBBFAC,PN

## 2023-11-30 PROCEDURE — 99213 OFFICE O/P EST LOW 20 MIN: CPT | Mod: PBBFAC,PN

## 2023-11-30 PROCEDURE — 92134 CPTRZ OPH DX IMG PST SGM RTA: CPT | Mod: PBBFAC,PN | Performed by: OPHTHALMOLOGY

## 2023-11-30 NOTE — PROGRESS NOTES
HPI    RTC 6 months OCT mac    Patient states that she is experiencing burning and tearing for about 1   month, she is also having trouble reading for about a month now and she   has been using the eye drops Systane at her last visit and they do not   seem to be helping     Last edited by Demetrice Chi MA on 11/30/2023  9:57 AM.            Assessment /Plan     For exam results, see Encounter Report.    There are no diagnoses linked to this encounter.  HPI     age-related macular degeneration     Additional comments: OCT MAC, DFE           Dry Eye     Additional comments: Patient states OU burning, tearing, glare bothersome   even with sunglasses, and mild pain all of the time. Using AFT OU x 6   times a day. Patient states that ointment that we gave her at last visit   helped but she is out now.            Comments    Early dry stage nonexudative age-related macular degeneration, eyes dry           Last edited by Emily Thompson MA on 7/11/2023  9:32 AM.            Assessment /Plan     For exam results, see Encounter Report.    There are no diagnoses linked to this encounter.      OCT MAC 11/30/2023  OD: , intact foveal contour, no IRF/SRF  OS: , intact foveal contour, disruption of the OS junction subfoveally       PRIOR TESTING    OCT mac 9/30/2022  OD:218 Good foveal contour with few drusen is os intact  OS: 202 Good foveal contour with few drusen; is os disruption at fovea  OCT mac 10/2021  OD:213 Good foveal contour with few drusen is os intact  OS: 206 Good foveal contour with few drusen; is os disruption at fovea  OCT MAC 07/11/2023  OD: , intact foveal contour, no IRF/SRF  OS: , intact foveal contour, disruption of the OS junction subfoveally     MRX 3/2021  -0.5 +2.50 x 6  -2.25 +3.25 x175  Add: +250    imer 3/30/21  OD: Flat SimK 40.99, steep simK 41.89, astig 1.8D at 6  OS; Flat SimK 40.22, Steep simK 42.44, astig 2.23D at 165     Assessment/Plan   1) PSK, OU  - most recently  s/p CE/IOL OS (9/24/2020)  - Likely residual astigmatism ( Patient decided to get basic lens instead of toric lens offered )  - difficult MRx previously, so imer obtained 3/2021. With residual astigmatism OU  - Patient with new MRx today 11/30/23, 20/25//20/30.  - Pt w PCO NVS, monitor    2. KIKI 2/2 blephritis OU and lower eyleid laxity OU  - artificial tears 4-6 x/daily, WC, LS  - start nighttime denise    3. ARMD OU, mild  - Amsler grid daily  - No smoking, good diet and exercise encouraged  - OCT mac stable with no IRF/SRF  - annual DFE     4. Blepharoptosis OU  - not interested in surgery at this time, consider ptosis field in future    RTC 6 months OCT mac, DFE

## 2024-01-25 RX ORDER — ALENDRONATE SODIUM 70 MG/1
70 TABLET ORAL
Qty: 12 TABLET | Refills: 0 | Status: SHIPPED | OUTPATIENT
Start: 2024-01-25

## 2024-02-06 RX ORDER — LOSARTAN POTASSIUM 100 MG/1
100 TABLET ORAL
Qty: 90 TABLET | Refills: 0 | Status: SHIPPED | OUTPATIENT
Start: 2024-02-06 | End: 2024-06-19

## 2024-03-11 ENCOUNTER — OFFICE VISIT (OUTPATIENT)
Dept: GYNECOLOGY | Facility: CLINIC | Age: 79
End: 2024-03-11
Payer: MEDICARE

## 2024-03-11 VITALS
WEIGHT: 117.81 LBS | SYSTOLIC BLOOD PRESSURE: 169 MMHG | OXYGEN SATURATION: 99 % | HEIGHT: 62 IN | DIASTOLIC BLOOD PRESSURE: 69 MMHG | BODY MASS INDEX: 21.68 KG/M2 | HEART RATE: 66 BPM

## 2024-03-11 DIAGNOSIS — Z00.00 HEALTHCARE MAINTENANCE: ICD-10-CM

## 2024-03-11 DIAGNOSIS — N81.9 FEMALE GENITAL PROLAPSE, UNSPECIFIED TYPE: Primary | Chronic | ICD-10-CM

## 2024-03-11 PROCEDURE — 99213 OFFICE O/P EST LOW 20 MIN: CPT | Mod: PBBFAC

## 2024-03-11 NOTE — PROGRESS NOTES
"  [facility] GYNECOLOGY CLINIC NOTE     Kika Farias is a 78 y.o.  presenting to GYN clinic for pessary check. She has h/o Stage III Ba prolapse managed with a #2 3/4 Gellhorn pessary. She was last seen 2023 with no issues.     She is doing well. She has been using the estrogen cream. She denies any vaginal discharge, malodor, pain or bleeding. She is not sexually active. She comes in q6 months for pessary check, cleaning and replacement.     Gynecology  OB History          4    Para   3    Term   3            AB   1    Living   3         SAB        IAB        Ectopic        Multiple        Live Births                    Past Medical History:   Diagnosis Date    GERD (gastroesophageal reflux disease)     Hyperlipidemia     Hypertension     Osteoporosis     Unspecified osteoarthritis, unspecified site       Past Surgical History:   Procedure Laterality Date    TUBAL LIGATION        Current Outpatient Medications   Medication Instructions    alendronate (FOSAMAX) 70 mg, Oral, Every 7 days    amLODIPine (NORVASC) 5 mg, Oral, Daily    calcium carbonate (OS-GERALDINE) 600 mg, Oral, Once    collagen/biotin/ascorbic acid (COLLAGEN 1500 PLUS C ORAL) Oral    hydroCHLOROthiazide (HYDRODIURIL) 12.5 mg, Oral, Daily    hydrOXYzine pamoate (VISTARIL) 25 mg, Oral, Nightly PRN    ibuprofen (ADVIL,MOTRIN) 800 mg, Oral, Every 8 hours    losartan (COZAAR) 100 mg, Oral    omeprazole (PRILOSEC) 20 mg, Oral, Daily PRN    PREMARIN 1 g, Vaginal, Daily    simvastatin (ZOCOR) 10 mg, Oral, Nightly     Social History     Tobacco Use    Smoking status: Never     Passive exposure: Never    Smokeless tobacco: Never   Substance Use Topics    Alcohol use: Never    Drug use: Never       Review of Systems  Pertinent items are noted in HPI.     Objective:     BP (!) 169/69 (BP Location: Right arm)   Pulse 66   Ht 5' 2" (1.575 m)   Wt 53.4 kg (117 lb 12.8 oz)   SpO2 99%   BMI 21.55 kg/m²   Physical Exam:  Gen: Well-nourished, " well-developed female appearing stated age. Alert, cooperative, in no acute distress.  HEENT: No thyromegaly, goiter, or masses  CV: rrr  Chest: ctabl  Abdomen: Soft, non-tender, no masses.  Extrem: Extremities normal, atraumatic, non-tender calves.  External genitalia: Normal female genetalia without lesion, discharge or tenderness. I  Speculum Exam: Vaginal vault without discharge, nonodorous, no lesions/masses seen. Atrophic vagina. Cervical os visualized as closed, no lesions/masses. Small 1cm polyp noted at the cervix stable. Not friable. No interval growth from last exam.  Bimanual Exam: No cervical motion tenderness.  Uterus freely mobile.  Small 6 week size uterus. No adnexal masses.  Nontender exam.   Of note, patient with tight levator ani, difficult removal, and re-insertion. Patient tolerated it well.  Note: RN chaperone present for entirety of exam.    Assessment/Plan:   Kika Farias is a 78 y.o.  with Stage III Ba prolapse managed with a #2 3/4 Gellhorn pessary.    1. Female genital prolapse, unspecified type        2. Healthcare maintenance          Problem List Items Addressed This Visit          Renal/    Female genital prolapse - Primary (Chronic)     Pt content with current management  Exam reassuring  Continue vaginal estrogen cream            Other    Healthcare maintenance     Pap: N/A, age > 65 with history of normal Pap smears  Mammogram 2022: Results were BIRADS 1, order in place, will make sure get scheduled.  Bone density: DXA 10/2022 with evidence of known osteoporosis            Return to clinic in 6 months for pessary check    Discussed patient and plan with Dr. Kimbrough.    Isabelle Aguilera MD PGY3  Obstetrics & Gynecology   2024

## 2024-03-11 NOTE — PROGRESS NOTES
Pt came in with high blood pressure. Pt voiced she is taking her BP meds. Pt isn't experiencing headache, chest pains, dizziness. ER precautions given pt voiced understanding. Pt voiced she has a f/u with PCP for BP.

## 2024-03-12 PROBLEM — Z00.00 HEALTHCARE MAINTENANCE: Status: ACTIVE | Noted: 2024-03-12

## 2024-03-12 PROBLEM — Z00.00 HEALTHCARE MAINTENANCE: Status: ACTIVE | Noted: 2024-03-11

## 2024-03-12 NOTE — ASSESSMENT & PLAN NOTE
Pt content with current management  Exam reassuring  Continue vaginal estrogen cream   Fluconazole Counseling:  Patient counseled regarding adverse effects of fluconazole including but not limited to headache, diarrhea, nausea, upset stomach, liver function test abnormalities, taste disturbance, and stomach pain.  There is a rare possibility of liver failure that can occur when taking fluconazole.  The patient understands that monitoring of LFTs and kidney function test may be required, especially at baseline. The patient verbalized understanding of the proper use and possible adverse effects of fluconazole.  All of the patient's questions and concerns were addressed.

## 2024-03-12 NOTE — ASSESSMENT & PLAN NOTE
Pap: N/A, age > 65 with history of normal Pap smears  Mammogram 11/2022: Results were BIRADS 1, order in place, will make sure get scheduled.  Bone density: DXA 10/2022 with evidence of known osteoporosis

## 2024-03-15 ENCOUNTER — TELEPHONE (OUTPATIENT)
Dept: GYNECOLOGY | Facility: CLINIC | Age: 79
End: 2024-03-15

## 2024-03-15 NOTE — TELEPHONE ENCOUNTER
Called pt to inform she needs to scheduled her mammogram for this year, per provider. Pt states that she can't make it to her mammogram appointment because she is caring for her recently operated . Pt states that she will call us when she is available.

## 2024-03-15 NOTE — TELEPHONE ENCOUNTER
----- Message from Shira Hood sent at 3/15/2024  2:38 PM CDT -----  Regarding: FW: Mammogram    ----- Message -----  From: Isabelle Aguilera MD  Sent: 3/12/2024   6:33 AM CDT  To: [facility] Gynecology Front Office Staff  Subject: Mammogram                                        Hello,  Can we make sure her mammogram gets scheduled for this year? Let me know if she needs a new order.  Thank you.  Isabelle Aguilera MD PGY3  Obstetrics & Gynecology

## 2024-03-28 RX ORDER — HYDROCHLOROTHIAZIDE 12.5 MG/1
12.5 TABLET ORAL
Qty: 90 TABLET | Refills: 0 | Status: SHIPPED | OUTPATIENT
Start: 2024-03-28

## 2024-04-15 ENCOUNTER — HOSPITAL ENCOUNTER (OUTPATIENT)
Dept: RADIOLOGY | Facility: HOSPITAL | Age: 79
Discharge: HOME | End: 2024-04-15
Attending: STUDENT IN AN ORGANIZED HEALTH CARE EDUCATION/TRAINING PROGRAM
Payer: MEDICARE

## 2024-04-15 DIAGNOSIS — Z12.31 ENCOUNTER FOR SCREENING MAMMOGRAM FOR MALIGNANT NEOPLASM OF BREAST: ICD-10-CM

## 2024-04-15 DIAGNOSIS — Z12.39 ENCOUNTER FOR BREAST CANCER SCREENING OTHER THAN MAMMOGRAM: ICD-10-CM

## 2024-04-15 PROCEDURE — 77063 BREAST TOMOSYNTHESIS BI: CPT | Mod: 26,,, | Performed by: RADIOLOGY

## 2024-04-15 PROCEDURE — 77063 BREAST TOMOSYNTHESIS BI: CPT | Mod: TC

## 2024-04-15 PROCEDURE — 77067 SCR MAMMO BI INCL CAD: CPT | Mod: 26,,, | Performed by: RADIOLOGY

## 2024-05-06 DIAGNOSIS — I10 HYPERTENSION, UNSPECIFIED TYPE: ICD-10-CM

## 2024-05-07 ENCOUNTER — OFFICE VISIT (OUTPATIENT)
Dept: INTERNAL MEDICINE | Facility: CLINIC | Age: 79
End: 2024-05-07
Payer: MEDICARE

## 2024-05-07 VITALS
SYSTOLIC BLOOD PRESSURE: 135 MMHG | BODY MASS INDEX: 21.13 KG/M2 | WEIGHT: 114.81 LBS | TEMPERATURE: 98 F | DIASTOLIC BLOOD PRESSURE: 66 MMHG | HEART RATE: 69 BPM | OXYGEN SATURATION: 98 % | HEIGHT: 62 IN

## 2024-05-07 DIAGNOSIS — R63.4 WEIGHT LOSS: Primary | ICD-10-CM

## 2024-05-07 PROCEDURE — 99213 OFFICE O/P EST LOW 20 MIN: CPT | Mod: S$PBB,,, | Performed by: NURSE PRACTITIONER

## 2024-05-07 PROCEDURE — 99214 OFFICE O/P EST MOD 30 MIN: CPT | Mod: PBBFAC | Performed by: NURSE PRACTITIONER

## 2024-05-07 RX ORDER — AMLODIPINE BESYLATE 5 MG/1
5 TABLET ORAL
Qty: 90 TABLET | Refills: 3 | Status: SHIPPED | OUTPATIENT
Start: 2024-05-07

## 2024-05-07 NOTE — PROGRESS NOTES
Lian Hill, ALE   [facility]  [address] [facility] [address]      PATIENT NAME: Kika Farias  : 1945  DATE: 24  MRN: [id number]      Reason for Visit / Chief Complaint: medication concern (Pt feels that the simvastatin is making her loss weight and she don't like it as well as her kids.)       History of Present Illness / Problem Focused Workflow     Kika Farias is a 78 y.o. [place] citizen female presents to the clinic for weight loss. PMH HTN, HLD, prediabetes, macular degeneration, osteoporosis, left knee OA, uterine prolapse and ureteral carbuncle. Pt followed by [facility] GYN, audiology and optho clinics.      #[id number], Barbara, used during visit today. Today, pt reports she had cut potatoes and rice out of her diet and decreased portion sizes after informed she was prediabetic in Nov. Pt has lost 15 lbs since that time. She has resumed eating regular/ low sodium diet. Pt stopped taking simvastatin as she thought that was cause of her weight loss. Informed pt she had been on med for several years and she can resume tonight; understanding voiced. Reports other med compliance. Denies chest pain, shortness of breath, cough, fever, headache, dizziness, weakness, abdominal pain, nausea, vomiting, diarrhea, constipation, dysuria, depression, anxiety, SI/HI.    Review of Systems     Review of Systems     See HPI for details    Constitutional: Denies Change in appetite. Denies Chills. Denies Fever. Denies Night sweats.  Eye: Denies Blurred vision. Denies Discharge. Denies Eye pain.  ENT: Denies Decreased hearing. Denies Sore throat. Denies Swollen glands.  Respiratory: Denies Cough. Denies Shortness of breath. Denies Shortness of breath with exertion. Denies Wheezing.  Cardiovascular: Denies Chest pain at rest. Denies Chest pain with exertion. Denies Irregular heartbeat. Denies Palpitations. Denies Edema.  Gastrointestinal: Denies  Abdominal pain. Denies Diarrhea. Denies Nausea. Denies Vomiting. Denies Hematemesis or Hematochezia.  Genitourinary: Denies Dysuria. Denies Urinary frequency. Denies Urinary urgency. Denies Blood in urine.  Endocrine: Denies Cold intolerance. Denies Excessive thirst. Denies Heat intolerance. Denies Weight loss. Denies Weight gain.  Musculoskeletal: Denies Painful joints. Denies Weakness.  Integumentary: Denies Rash. Denies Itching. Denies Dry skin.  Neurologic: Denies Dizziness. Denies Fainting. Denies Headache.  Psychiatric: Denies Depression. Denies Anxiety. Denies Suicidal/Homicidal ideations.    All Other ROS: Negative except as stated in HPI.     Medical / Surgical / Social / Family History       -------------------------------------    GERD (gastroesophageal reflux disease)    Hyperlipidemia    Hypertension    Osteoporosis    Unspecified osteoarthritis, unspecified site        Past Surgical History:   Procedure Laterality Date    TUBAL LIGATION         Social History     Socioeconomic History    Marital status: Single   Tobacco Use    Smoking status: Never     Passive exposure: Never    Smokeless tobacco: Never   Substance and Sexual Activity    Alcohol use: Never    Drug use: Never    Sexual activity: Yes     Partners: Male     Birth control/protection: See Surgical Hx     Social Determinants of Health     Food Insecurity: No Food Insecurity (9/21/2023)    Hunger Vital Sign     Worried About Running Out of Food in the Last Year: Never true     Ran Out of Food in the Last Year: Never true   Transportation Needs: No Transportation Needs (10/26/2022)    PRAPARE - Transportation     Lack of Transportation (Medical): No     Lack of Transportation (Non-Medical): No   Stress: No Stress Concern Present (9/21/2023)    [employer] - Occupational Stress Questionnaire     Feeling of Stress : Not at all   Housing Stability: Low Risk  (10/26/2022)    Housing Stability Vital Sign     Unable to  "Pay for Housing in the Last Year: No     Number of Places Lived in the Last Year: 1     Unstable Housing in the Last Year: No        No family history on file.     Medications and Allergies     Medications  Current Outpatient Medications   Medication Instructions    alendronate (FOSAMAX) 70 mg, Oral, Every 7 days    amLODIPine (NORVASC) 5 mg, Oral, Daily    calcium carbonate (OS-GERALDINE) 600 mg, Oral, Once    collagen/biotin/ascorbic acid (COLLAGEN 1500 PLUS C ORAL) Oral    hydroCHLOROthiazide (HYDRODIURIL) 12.5 mg, Oral    hydrOXYzine pamoate (VISTARIL) 25 mg, Oral, Nightly PRN    ibuprofen (ADVIL,MOTRIN) 800 mg, Oral, Every 8 hours    losartan (COZAAR) 100 mg, Oral    omeprazole (PRILOSEC) 20 mg, Oral, Daily PRN    PREMARIN 1 g, Vaginal, Daily    simvastatin (ZOCOR) 10 mg, Oral, Nightly         Allergies  Review of patient's allergies indicates:  No Known Allergies    Physical Examination     /66 (BP Location: Right arm, Patient Position: Sitting, BP Method: Medium (Automatic))   Pulse 69   Temp 98 °F (36.7 °C) (Oral)   Ht 5' 2" (1.575 m)   Wt 52.1 kg (114 lb 12.8 oz)   SpO2 98%   BMI 21.00 kg/m²     Physical Exam    General: Alert and oriented, No acute distress.  Head: Normocephalic, Atraumatic.  Eye: Pupils are equal, round and reactive to light, Extraocular movements are intact, Sclera non-icteric.  Ears/Nose/Throat: Normal, Mucosa moist,Clear.  Neck/Thyroid: Supple, Non-tender, No carotid bruit, No lymphadenopathy, No JVD, Full range of motion.  Respiratory: Clear to auscultation bilaterally; No wheezes, rales or rhonchi,Non-labored respirations, Symmetrical chest wall expansion.  Cardiovascular: Regular rate and rhythm, S1/S2 normal, No murmurs, rubs or gallops.  Gastrointestinal: Soft, Non-tender, Non-distended, Normal bowel sounds, No palpable organomegaly.  Musculoskeletal: Normal range of motion.  Integumentary: Warm, Dry, Intact, No suspicious lesions or rashes.  Extremities: No clubbing, " cyanosis or edema  Neurologic: No focal deficits, Cranial Nerves II-XII are grossly intact, Motor strength normal upper and lower extremities, Sensory exam intact.  Psychiatric: Normal interaction, Coherent speech, Appropriate affect       Results     Lab Results   Component Value Date    WBC 7.1 11/16/2022    HGB 13.2 11/16/2022    HCT 39.0 11/16/2022     11/16/2022    CHOL 191 11/16/2022    TRIG 81 11/16/2022    ALT 11 11/16/2022    AST 19 11/16/2022     (L) 11/16/2022    K 4.6 11/16/2022    CREATININE 0.78 11/16/2022    BUN 12.3 11/16/2022    CO2 29 11/16/2022    TSH 1.5322 11/16/2022    INR 0.92 11/29/2021    HGBA1C 5.8 11/16/2022         Assessment and Plan (including Health Maintenance)     Plan:     1. Weight loss  Labs, CXR and CT abd ordered.  Scheduled CT 5/7/24 at 4:00 pm with 3:30 pm arrival time.  Informed nothing to eat or drink after noon tomorrow.  Understanding voiced.     - Hepatitis Panel, Acute; Future  - HIV 1/2 Ag/Ab (4th Gen); Future  - X-Ray Chest PA And Lateral; Future  - CT Abdomen Pelvis  Without Contrast; Future      Problem List Items Addressed This Visit          Endocrine    Weight loss - Primary    Relevant Orders    Hepatitis Panel, Acute    HIV 1/2 Ag/Ab (4th Gen)    X-Ray Chest PA And Lateral    CT Abdomen Pelvis  Without Contrast         Health Maintenance Due   Topic Date Due    RSV Vaccine (Age 60+ and Pregnant patients) (1 - 1-dose 60+ series) Never done    COVID-19 Vaccine (4 - 2023-24 season) 09/01/2023       Follow up in about 8 days (around 5/15/2024) for at 1:20 pm for face to face visit, Follow up, Lab Results.        Signature:  ALE Galan  [facility]  [address] [facility] [address]

## 2024-05-08 ENCOUNTER — HOSPITAL ENCOUNTER (OUTPATIENT)
Dept: RADIOLOGY | Facility: HOSPITAL | Age: 79
Discharge: HOME | End: 2024-05-08
Attending: NURSE PRACTITIONER
Payer: MEDICARE

## 2024-05-08 DIAGNOSIS — R63.4 WEIGHT LOSS: ICD-10-CM

## 2024-05-08 PROCEDURE — 74176 CT ABD & PELVIS W/O CONTRAST: CPT | Mod: TC

## 2024-05-15 ENCOUNTER — OFFICE VISIT (OUTPATIENT)
Dept: INTERNAL MEDICINE | Facility: CLINIC | Age: 79
End: 2024-05-15
Payer: MEDICARE

## 2024-05-15 VITALS
WEIGHT: 113.81 LBS | SYSTOLIC BLOOD PRESSURE: 128 MMHG | HEART RATE: 65 BPM | TEMPERATURE: 98 F | HEIGHT: 62 IN | OXYGEN SATURATION: 100 % | BODY MASS INDEX: 20.94 KG/M2 | DIASTOLIC BLOOD PRESSURE: 64 MMHG

## 2024-05-15 DIAGNOSIS — R73.03 PREDIABETES: Chronic | ICD-10-CM

## 2024-05-15 DIAGNOSIS — R63.4 WEIGHT LOSS: ICD-10-CM

## 2024-05-15 DIAGNOSIS — I70.90 ATHEROSCLEROSIS: Primary | Chronic | ICD-10-CM

## 2024-05-15 DIAGNOSIS — I10 PRIMARY HYPERTENSION: Chronic | ICD-10-CM

## 2024-05-15 PROCEDURE — 99214 OFFICE O/P EST MOD 30 MIN: CPT | Mod: S$PBB,,, | Performed by: NURSE PRACTITIONER

## 2024-05-15 PROCEDURE — 99215 OFFICE O/P EST HI 40 MIN: CPT | Mod: PBBFAC | Performed by: NURSE PRACTITIONER

## 2024-05-15 NOTE — PROGRESS NOTES
Lian Hill, ALE   [facility]  [address] [facility] [address]      PATIENT NAME: Kika Farias  : 1945  DATE: 5/15/24  MRN: [id number]      Reason for Visit / Chief Complaint: Follow-up (Labs are done , pt states that she is worried and wants to know what her labs look like.)       History of Present Illness / Problem Focused Workflow     Kika Farisa is a 78 y.o. [place] female presents to the clinic for weight loss f/u. PMH HTN, HLD, prediabetes, macular degeneration, osteoporosis, left knee OA, uterine prolapse and ureteral carbuncle. Pt followed by [facility] GYN, audiology and optho clinics.       #[id number], Maximino Asif, used during visit today. Lab and CT results discussed with pt. Informed that likely cause of weight loss was her drastic dietary modifications as a result of being informed she was prediabetic. Since last visit, she has resumed eating some carbs and has gained a couple pounds. Reports med compliance. Has resumed taking statin since last visit. Denies chest pain, shortness of breath, cough, fever, headache, dizziness, weakness, abdominal pain, nausea, vomiting, diarrhea, constipation, dysuria, depression, anxiety, SI/HI.    Review of Systems     Review of Systems     See HPI for details    Constitutional: Denies Change in appetite. Denies Chills. Denies Fever. Denies Night sweats.  Eye: Denies Blurred vision. Denies Discharge. Denies Eye pain.  ENT: Denies Decreased hearing. Denies Sore throat. Denies Swollen glands.  Respiratory: Denies Cough. Denies Shortness of breath. Denies Shortness of breath with exertion. Denies Wheezing.  Cardiovascular: Denies Chest pain at rest. Denies Chest pain with exertion. Denies Irregular heartbeat. Denies Palpitations. Denies Edema.  Gastrointestinal: Denies Abdominal pain. Denies Diarrhea. Denies Nausea. Denies Vomiting. Denies Hematemesis or Hematochezia.  Genitourinary:  Denies Dysuria. Denies Urinary frequency. Denies Urinary urgency. Denies Blood in urine.  Endocrine: Denies Cold intolerance. Denies Excessive thirst. Denies Heat intolerance. Denies Weight loss. Denies Weight gain.  Musculoskeletal: Denies Painful joints. Denies Weakness.  Integumentary: Denies Rash. Denies Itching. Denies Dry skin.  Neurologic: Denies Dizziness. Denies Fainting. Denies Headache.  Psychiatric: Denies Depression. Denies Anxiety. Denies Suicidal/Homicidal ideations.    All Other ROS: Negative except as stated in HPI.     Medical / Surgical / Social / Family History       -------------------------------------    GERD (gastroesophageal reflux disease)    Hyperlipidemia    Hypertension    Osteoporosis    Unspecified osteoarthritis, unspecified site        Past Surgical History:   Procedure Laterality Date    TUBAL LIGATION         Social History     Socioeconomic History    Marital status: Single   Tobacco Use    Smoking status: Never     Passive exposure: Never    Smokeless tobacco: Never   Substance and Sexual Activity    Alcohol use: Never    Drug use: Never    Sexual activity: Yes     Partners: Male     Birth control/protection: See Surgical Hx     Social Determinants of Health     Food Insecurity: No Food Insecurity (9/21/2023)    Hunger Vital Sign     Worried About Running Out of Food in the Last Year: Never true     Ran Out of Food in the Last Year: Never true   Transportation Needs: No Transportation Needs (10/26/2022)    PRAPARE - Transportation     Lack of Transportation (Medical): No     Lack of Transportation (Non-Medical): No   Stress: No Stress Concern Present (9/21/2023)    [employer] - Occupational Stress Questionnaire     Feeling of Stress : Not at all   Housing Stability: Low Risk  (10/26/2022)    Housing Stability Vital Sign     Unable to Pay for Housing in the Last Year: No     Number of Places Lived in the Last Year: 1     Unstable Housing in the Last  "Year: No        No family history on file.     Medications and Allergies     Medications  Current Outpatient Medications   Medication Instructions    alendronate (FOSAMAX) 70 mg, Oral, Every 7 days    amLODIPine (NORVASC) 5 mg, Oral    calcium carbonate (OS-GERALDINE) 600 mg, Oral, Once    collagen/biotin/ascorbic acid (COLLAGEN 1500 PLUS C ORAL) Oral    hydroCHLOROthiazide (HYDRODIURIL) 12.5 mg, Oral    ibuprofen (ADVIL,MOTRIN) 800 mg, Oral, Every 8 hours    losartan (COZAAR) 100 mg, Oral    omeprazole (PRILOSEC) 20 mg, Oral, Daily PRN    PREMARIN 1 g, Vaginal, Daily    simvastatin (ZOCOR) 10 mg, Oral, Nightly         Allergies  Review of patient's allergies indicates:  No Known Allergies    Physical Examination     /64 (BP Location: Right arm, Patient Position: Sitting, BP Method: Medium (Manual))   Pulse 65   Temp 97.8 °F (36.6 °C) (Oral)   Ht 5' 2" (1.575 m)   Wt 51.6 kg (113 lb 12.8 oz)   SpO2 100%   BMI 20.81 kg/m²     Physical Exam    General: Alert and oriented, No acute distress.  Head: Normocephalic, Atraumatic.  Eye: Pupils are equal, round and reactive to light, Extraocular movements are intact, Sclera non-icteric.  Ears/Nose/Throat: Normal, Mucosa moist,Clear.  Neck/Thyroid: Supple, Non-tender, No carotid bruit, No lymphadenopathy, No JVD, Full range of motion.  Respiratory: Clear to auscultation bilaterally; No wheezes, rales or rhonchi,Non-labored respirations, Symmetrical chest wall expansion.  Cardiovascular: Regular rate and rhythm, S1/S2 normal, No murmurs, rubs or gallops.  Gastrointestinal: Soft, Non-tender, Non-distended, Normal bowel sounds, No palpable organomegaly.  Musculoskeletal: Normal range of motion.  Integumentary: Warm, Dry, Intact, No suspicious lesions or rashes.  Extremities: No clubbing, cyanosis or edema  Neurologic: No focal deficits, Cranial Nerves II-XII are grossly intact, Motor strength normal upper and lower extremities, Sensory exam intact.  Psychiatric: Normal " interaction, Coherent speech, Appropriate affect       Results     Lab Results   Component Value Date    WBC 6.85 05/08/2024    HGB 13.8 05/08/2024    HCT 40.7 05/08/2024     05/08/2024    CHOL 214 (H) 05/08/2024    TRIG 66 05/08/2024    ALT 10 05/08/2024    AST 18 05/08/2024     05/08/2024    K 4.1 05/08/2024    CREATININE 0.81 05/08/2024    BUN 13.7 05/08/2024    CO2 29 05/08/2024    TSH 1.637 05/08/2024    INR 0.92 11/29/2021    HGBA1C 5.5 05/08/2024     CT Abdomen Pelvis  Without Contrast  Order: [id number]  Status: Final result       Visible to patient: Yes (not seen)       Next appt: 06/03/2024 at 08:30 AM in Ophthalmology ([facility],  [facility])       Dx: Weight loss    0 Result Notes  Details    Reading Physician Reading Date Result Priority   Bello Nunez MD  [phone] 5/8/2024 Routine     Narrative & Impression  EXAMINATION:  CT ABDOMEN PELVIS WITHOUT CONTRAST     CLINICAL HISTORY:  Weight loss, unintended; Abnormal weight loss     TECHNIQUE:  Low dose axial images, sagittal and coronal reformations were obtained from the lung bases to the pubic symphysis.  No oral contrast was given.     Automatic exposure control (AEC) was utilized for dose reduction.     Dose: 258.07 by mGycm     COMPARISON:  03/04/2017     FINDINGS:  There are atelectatic changes in the lung bases.  Liver appears normal.  Spleen appears normal.  Pancreas is poorly delineated.  A definite abnormality is not demonstrated.  Biliary system appears normal.  The adrenals are not enlarged.  Kidneys appear normal.  Aorta shows calcification without an aneurysm present.  The appendix appears normal.  There are calcifications in the uterus most consistent with a calcified leiomyoma.  There are diverticulum in the colon without evidence of acute diverticulitis.  There is what is felt to represent a pessary device in place.     Impression:     Dense vascular calcification.     No definite abnormalities are seen on this  noncontrast exam.        Electronically signed by:Bello Nunez MD  Date:                                            05/08/2024  Time:                                           15:59       Assessment and Plan (including Health Maintenance)     Plan:     1. Weight loss  Has increased since resumed low sodium diet.  Labs and CT unremarkable.    2. Atherosclerosis  Incidental finding on CT abd on 5/8/24.  Pt with hx of HTN and HLD.  Referral to cardio to eval/treat.  Keep cardio appts/diagnostics as scheduled.  Stressed importance of adequate LDL, HA1C, and BP control.  Discussed appropriate lifestyle changes including smoking cessation, exercise, weight loss, and dietary modifications.  ED precautions reviewed including SOB, UGARTE, chest pain, dizziness, near syncope, palpitations, or jaw/back/neck discomfort.     - Ambulatory referral/consult to Cardiology; Future  - Lipid Panel; Future    Problem List Items Addressed This Visit          Cardiac/Vascular    Hypertension (Chronic)    Relevant Orders    Comprehensive Metabolic Panel    Atherosclerosis - Primary (Chronic)    Relevant Orders    Ambulatory referral/consult to Cardiology    Lipid Panel       Endocrine    Prediabetes (Chronic)    Relevant Orders    Hemoglobin A1C    Weight loss         Health Maintenance Due   Topic Date Due    RSV Vaccine (Age 60+ and Pregnant patients) (1 - 1-dose 60+ series) Never done    COVID-19 Vaccine (4 - 2023-24 season) 09/01/2023       Follow up in about 6 months (around 11/15/2024) for Follow up, Lab Results, Prediabetes, HTN.        Signature:  ALE Galan  [facility]  [address] [facility] [address]

## 2024-06-03 ENCOUNTER — OFFICE VISIT (OUTPATIENT)
Dept: OPHTHALMOLOGY | Facility: CLINIC | Age: 79
End: 2024-06-03
Payer: MEDICARE

## 2024-06-03 VITALS — WEIGHT: 114.63 LBS | BODY MASS INDEX: 21.1 KG/M2 | HEIGHT: 62 IN

## 2024-06-03 DIAGNOSIS — Z96.1 PSEUDOPHAKIA OF BOTH EYES: ICD-10-CM

## 2024-06-03 DIAGNOSIS — H26.499 POSTERIOR CAPSULAR OPACIFICATION, UNSPECIFIED LATERALITY: Primary | ICD-10-CM

## 2024-06-03 PROCEDURE — 99213 OFFICE O/P EST LOW 20 MIN: CPT | Mod: PBBFAC,PN | Performed by: STUDENT IN AN ORGANIZED HEALTH CARE EDUCATION/TRAINING PROGRAM

## 2024-06-03 NOTE — PROGRESS NOTES
HPI    RTC 6 months OCT mac, DFE    C/O tears, burning, poor near vision even with glasses  Last edited by Alejandra Disla RN on 6/3/2024  8:46 AM.            Assessment /Plan     For exam results, see Encounter Report.    There are no diagnoses linked to this encounter.  HPI     age-related macular degeneration     Additional comments: OCT MAC, DFE           Dry Eye     Additional comments: Patient states OU burning, tearing, glare bothersome   even with sunglasses, and mild pain all of the time. Using AFT OU x 6   times a day. Patient states that ointment that we gave her at last visit   helped but she is out now.            Comments    Early dry stage nonexudative age-related macular degeneration, eyes dry           Last edited by Emily Thompson MA on 7/11/2023  9:32 AM.            Assessment /Plan     For exam results, see Encounter Report.    There are no diagnoses linked to this encounter.      OCT MAC 11/30/2023  OD: , intact foveal contour, no IRF/SRF  OS: , intact foveal contour, disruption of the OS junction subfoveally       PRIOR TESTING    OCT mac  - 9/30/2022:  OD:218 Good foveal contour with few drusen is os intact  OS: 202 Good foveal contour with few drusen; is os disruption at fovea  - 10/2021:  OD:213 Good foveal contour with few drusen is os intact  OS: 206 Good foveal contour with few drusen; is os disruption at fovea  - 07/11/2023:  OD: , intact foveal contour, no IRF/SRF  OS: , intact foveal contour, disruption of the OS junction subfoveally     MRX:  - 3/2021  OD: -0.5 +2.50 x 6  OS: -2.25 +3.25 x175  Add: +250    Patricio 3/30/21  OD: Flat SimK 40.99, steep simK 41.89, astig 1.8D at 6  OS; Flat SimK 40.22, Steep simK 42.44, astig 2.23D at 165     Assessment/Plan:    1) Pseudophakic, OU  - most recently s/p CE/IOL OS (9/24/2020)  - Likely residual astigmatism ( Patient decided to get basic lens instead of toric lens offered )  - difficult MRx previously, so patricio  obtained 3/2021. With residual astigmatism OU  - Patient with new MRx 11/30/23, 20/25 // 20/30.    2. 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 Patient wishes for treatment after discussion of r/b/a. Will bring back for next available procedure day for YAG Cap OS.    3. KIKI 2/2 blephritis OU and lower eyleid laxity OU  - artificial tears 4-6 x/daily, WC, LS  - start nighttime denise    4. ARMD OU, mild  - Amsler grid daily  - No smoking, good diet and exercise encouraged  - OCT mac stable with no IRF/SRF  - annual DFE    5. Blepharoptosis OU  - not interested in surgery at this time, consider ptosis field in future        RTC 6/26/24 for YAG Cap OS

## 2024-06-17 PROBLEM — Z00.00 HEALTHCARE MAINTENANCE: Status: RESOLVED | Noted: 2024-03-11 | Resolved: 2024-06-17

## 2024-06-19 RX ORDER — LOSARTAN POTASSIUM 100 MG/1
100 TABLET ORAL
Qty: 90 TABLET | Refills: 0 | Status: SHIPPED | OUTPATIENT
Start: 2024-06-19

## 2024-06-21 RX ORDER — ALENDRONATE SODIUM 70 MG/1
70 TABLET ORAL
Qty: 12 TABLET | Refills: 0 | Status: SHIPPED | OUTPATIENT
Start: 2024-06-21

## 2024-06-27 RX ORDER — HYDROCHLOROTHIAZIDE 12.5 MG/1
12.5 TABLET ORAL
Qty: 90 TABLET | Refills: 0 | Status: SHIPPED | OUTPATIENT
Start: 2024-06-27

## 2024-06-27 NOTE — TELEPHONE ENCOUNTER
----- Message from Ayden Cast sent at 6/27/2024  9:56 AM CDT -----  Type:  RX Refill Request    Who Called: pt  Refill or New Rx:refill     RX Name and Strength:hydroCHLOROthiazide (HYDRODIURIL) 12.5 MG Tab  How is the patient currently taking it? (ex. 1XDay):  Is this a 30 day or 90 day RX:    Preferred Pharmacy with phone number:[facility] [address] [id number] [address]  Local or Mail Order:  Ordering Provider:  Would the patient rather a call back or a response via [facility]?   Best Call Back Number:  Additional Information:

## 2024-06-27 NOTE — TELEPHONE ENCOUNTER
Refills requested for the following medication(s).    hydroCHLOROthiazide (HYDRODIURIL) 12.5 MG Tab     LOV:5/15/24    NOV: 11/18/24

## 2024-07-03 NOTE — PROGRESS NOTES
CHIEF COMPLAINT:   Chief Complaint   Patient presents with    Follow-up     Fol                                                   HPI:  Kika Farias 78 y.o. female with a past medical history of hyperlipidemia, hypertension, GERD, prediabetes, insomnia, osteoarthritis, macular degeneration presents to Cardiology Clinic today to establish care.  She was referred here per PCP for incidental finding of atherosclerosis on CT scan.  There was evidence of atherosclerosis with in the aortic area with no evidence of any aortic aneurysm.  Today the patient states that she feels well from a cardiac standpoint.  She states that she has had episodes of chest pain in the past but she states that it was not extremely significant and she has a difficult time recalling the details.  She reports occasional episodes of her heart racing but it appears to be proportionate to the amount of activity that she was doing.  Otherwise she denies any further complaints such as palpitations, PND, orthopnea, lightheadedness, dizziness, syncope, peripheral edema, claudication symptoms.  She was able to complete her ADLs without any issues or ischemic symptoms.  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  She reports compliance with all her current medications.  She denies any tobacco or any other illicit drug use.                                                                                                                                                                                                                                                                                                                                                                                                                                                                                      CARDIAC TESTING:  No results found for this or any previous visit.    No results found for this or any  previous visit.     No results found for this or any previous visit.       Patient Active Problem List   Diagnosis    Early dry stage nonexudative age-related macular degeneration of both eyes    Dry eye syndrome of both eyes    Female genital prolapse    Gastroesophageal reflux disease    Hyperlipidemia    Hypertension    Osteoarthritis of left knee    Osteoporosis    Prediabetes    Primary osteoarthritis of right knee    Other insomnia    Weight loss    Atherosclerosis     Past Surgical History:   Procedure Laterality Date    TUBAL LIGATION       Social History     Socioeconomic History    Marital status: Single   Tobacco Use    Smoking status: Never     Passive exposure: Never    Smokeless tobacco: Never   Substance and Sexual Activity    Alcohol use: Never    Drug use: Never    Sexual activity: Yes     Partners: Male     Birth control/protection: See Surgical Hx     Social Determinants of Health     Food Insecurity: No Food Insecurity (9/21/2023)    Hunger Vital Sign     Worried About Running Out of Food in the Last Year: Never true     Ran Out of Food in the Last Year: Never true   Transportation Needs: No Transportation Needs (10/26/2022)    PRAPARE - Transportation     Lack of Transportation (Medical): No     Lack of Transportation (Non-Medical): No   Stress: No Stress Concern Present (9/21/2023)    [employer] - Occupational Stress Questionnaire     Feeling of Stress : Not at all   Housing Stability: Low Risk  (10/26/2022)    Housing Stability Vital Sign     Unable to Pay for Housing in the Last Year: No     Number of Places Lived in the Last Year: 1     Unstable Housing in the Last Year: No        No family history on file.  Review of patient's allergies indicates:  No Known Allergies      ROS:  Review of Systems   Constitutional: Negative.  Negative for malaise/fatigue.   HENT: Negative.     Eyes: Negative.    Respiratory: Negative.  Negative for shortness of breath.   "  Cardiovascular: Negative.  Negative for chest pain (Occasional), palpitations, orthopnea, claudication, leg swelling and PND.   Gastrointestinal: Negative.    Genitourinary: Negative.    Musculoskeletal: Negative.    Skin: Negative.    Neurological: Negative.  Negative for weakness.   Endo/Heme/Allergies: Negative.    Psychiatric/Behavioral: Negative.                                                                                                                                                                                  Negative except as stated in the history of present illness. See HPI for details.    PHYSICAL EXAM:  Visit Vitals  BP (!) 142/73   Pulse 66   Temp 97.7 °F (36.5 °C) (Oral)   Resp 18   Ht 5' 2" (1.575 m)   Wt 50.1 kg (110 lb 7.2 oz)   SpO2 100%   BMI 20.20 kg/m²       Physical Exam  Constitutional:       Appearance: She is obese. She is not ill-appearing.   HENT:      Head: Normocephalic.      Nose: Nose normal.      Mouth/Throat:      Mouth: Mucous membranes are moist.   Eyes:      Extraocular Movements: Extraocular movements intact.   Neck:      Vascular: No carotid bruit.   Cardiovascular:      Rate and Rhythm: Normal rate and regular rhythm.      Pulses: Normal pulses.      Heart sounds: Normal heart sounds. No murmur heard.  Pulmonary:      Effort: Pulmonary effort is normal.   Abdominal:      General: There is no distension.      Palpations: Abdomen is soft.   Musculoskeletal:         General: Normal range of motion.      Cervical back: Normal range of motion.      Right lower leg: No edema.      Left lower leg: No edema.   Skin:     General: Skin is warm.   Neurological:      General: No focal deficit present.      Mental Status: She is alert.   Psychiatric:         Mood and Affect: Mood normal.         Current Outpatient Medications   Medication Instructions    alendronate (FOSAMAX) 70 mg, Oral, Every 7 days    amLODIPine (NORVASC) 5 mg, Oral    calcium carbonate (OS-GERALDINE) 600 mg, Oral, " Once    collagen/biotin/ascorbic acid (COLLAGEN 1500 PLUS C ORAL) Oral    hydroCHLOROthiazide (HYDRODIURIL) 12.5 mg, Oral    ibuprofen (ADVIL,MOTRIN) 800 mg, Oral, Every 8 hours    losartan (COZAAR) 100 mg, Oral    omeprazole (PRILOSEC) 20 mg, Oral, Daily PRN    PREMARIN 1 g, Vaginal, Daily    simvastatin (ZOCOR) 10 mg, Oral, Nightly        All medications, laboratory studies, cardiac diagnostic imaging reviewed.     Lab Results   Component Value Date    .00 05/08/2024    .00 11/16/2022    TRIG 66 05/08/2024    TRIG 81 11/16/2022    CREATININE 0.81 05/08/2024    MG 2.1 05/27/2018    K 4.1 05/08/2024        ASSESSMENT/PLAN:    Atherosclerosis  - Incidental finding of atherosclerosis in aorta on CT scan-there was no evidence of aortic aneurysm on that scan   - Patient reports episodes of chest pain in the past but she states that she was unable to recall specific details and she does have a difficult time describing them  - She states that they occurred both at rest and with exertion but she was unable to give more details  - She does have several CAD risk factors including hypertension, hyperlipidemia, prediabetes  - Will have patient complete exercise nuclear stress test to rule out any underlying ischemia or infarction.  Patient states that she believes she can walk on a treadmill, however she does have osteoarthritis in her knees which may make it difficult.  If she was unable to exercise will convert to nuclear stress test with regadenoson  - EKG today with normal sinus rhythm, septal infarct, age undetermined, abnormal EKG  - We will continue with good blood pressure and cholesterol control and overall risk factor modification     HTN  - BP slightly above goal today   - Continue losartan 100 mg daily, HCTZ 12.5 mg daily, amlodipine 5 mg daily   - Counseled on the importance of following a low-sodium, heart healthy diet and exercise as tolerated    HLD  -  per labs May 2024  - Continue  Simvastatin 10 MG daily  - Repeat FLP ordered per PCP   - Counseled on the importance of following a low-cholesterol, low-fat diet and exercise as tolerated    Prediabetes  - Management per PCP        Complete stress test   EKG today   Follow up in cardiology clinic in 3 months or sooner if needed   Follow up with PCP as directed

## 2024-07-05 ENCOUNTER — OFFICE VISIT (OUTPATIENT)
Dept: OPHTHALMOLOGY | Facility: CLINIC | Age: 79
End: 2024-07-05
Payer: MEDICARE

## 2024-07-05 VITALS — HEIGHT: 62 IN | WEIGHT: 114.63 LBS | BODY MASS INDEX: 21.1 KG/M2

## 2024-07-05 DIAGNOSIS — H26.493 BILATERAL POSTERIOR CAPSULAR OPACIFICATION: Primary | ICD-10-CM

## 2024-07-05 DIAGNOSIS — H04.123 DRY EYE SYNDROME OF BOTH EYES: ICD-10-CM

## 2024-07-05 PROCEDURE — 99213 OFFICE O/P EST LOW 20 MIN: CPT | Mod: PBBFAC,PN

## 2024-07-05 NOTE — PROGRESS NOTES
OCT mac  - 11/30/2023  OD: , intact foveal contour, no IRF/SRF  OS: , intact foveal contour, disruption of the OS junction subfoveally   - 9/30/2022:  OD:218 Good foveal contour with few drusen is os intact  OS: 202 Good foveal contour with few drusen; is os disruption at fovea  - 10/2021:  OD:213 Good foveal contour with few drusen is os intact  OS: 206 Good foveal contour with few drusen; is os disruption at fovea  - 07/11/2023:  OD: , intact foveal contour, no IRF/SRF  OS: , intact foveal contour, disruption of the OS junction subfoveally     MRX:  - 3/2021  OD: -0.5 +2.50 x 6  OS: -2.25 +3.25 x175  Add: +250    Patricio 3/30/21  OD: Flat SimK 40.99, steep simK 41.89, astig 1.8D at 6  OS; Flat SimK 40.22, Steep simK 42.44, astig 2.23D at 165     Assessment/Plan:    1) Pseudophakic, OU  - most recently s/p CE/IOL OS (9/24/2020)  - Likely residual astigmatism ( Patient decided to get basic lens instead of toric lens offered )  - difficult MRx previously, so patricio obtained 3/2021. With residual astigmatism OU  - Patient with new MRx 11/30/23, 20/25 // 20/30.    2. 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 Patient wishes for treatment after discussion of r/b/a. Will bring back for next available procedure day for YAG Cap OS.  - 7/5/24: Pt rescheduled YAG OS 6/26/24 to 7/24/24. Pt's vision is stable but still blurry OS    3. KIKI 2/2 blephritis OU and lower eyleid laxity OU  - artificial tears 4-6 x/daily, WC, LS  - nighttime denise    4. ARMD OU, mild  - Amsler grid daily  - No smoking, good diet and exercise encouraged  - OCT mac stable with no IRF/SRF  - annual DFE    5. Blepharoptosis OU  - not interested in surgery at this time, consider ptosis field in future        RTC 7/24/24 AS for YAG OS

## 2024-07-10 ENCOUNTER — OFFICE VISIT (OUTPATIENT)
Dept: CARDIOLOGY | Facility: CLINIC | Age: 79
End: 2024-07-10
Payer: MEDICARE

## 2024-07-10 VITALS
RESPIRATION RATE: 18 BRPM | HEART RATE: 66 BPM | BODY MASS INDEX: 20.32 KG/M2 | OXYGEN SATURATION: 100 % | WEIGHT: 110.44 LBS | TEMPERATURE: 98 F | HEIGHT: 62 IN | SYSTOLIC BLOOD PRESSURE: 142 MMHG | DIASTOLIC BLOOD PRESSURE: 73 MMHG

## 2024-07-10 DIAGNOSIS — I70.90 ATHEROSCLEROSIS: Chronic | ICD-10-CM

## 2024-07-10 DIAGNOSIS — R07.9 CHEST PAIN, UNSPECIFIED TYPE: ICD-10-CM

## 2024-07-10 DIAGNOSIS — I10 PRIMARY HYPERTENSION: Chronic | ICD-10-CM

## 2024-07-10 DIAGNOSIS — R94.31 ABNORMAL EKG: Primary | ICD-10-CM

## 2024-07-10 DIAGNOSIS — E78.2 MIXED HYPERLIPIDEMIA: Chronic | ICD-10-CM

## 2024-07-10 LAB
OHS QRS DURATION: 80 MS
OHS QTC CALCULATION: 413 MS

## 2024-07-10 PROCEDURE — 99204 OFFICE O/P NEW MOD 45 MIN: CPT | Mod: S$PBB,,,

## 2024-07-10 PROCEDURE — 93005 ELECTROCARDIOGRAM TRACING: CPT

## 2024-07-10 PROCEDURE — 99215 OFFICE O/P EST HI 40 MIN: CPT | Mod: PBBFAC,25

## 2024-07-10 NOTE — PATIENT INSTRUCTIONS
Complete stress test   EKG today   Follow up in cardiology clinic in 3 months or sooner if needed   Follow up with PCP as directed

## 2024-07-24 ENCOUNTER — HOSPITAL ENCOUNTER (EMERGENCY)
Facility: HOSPITAL | Age: 79
Discharge: HOME | End: 2024-07-24
Attending: EMERGENCY MEDICINE
Payer: MEDICARE

## 2024-07-24 VITALS
HEART RATE: 62 BPM | SYSTOLIC BLOOD PRESSURE: 130 MMHG | OXYGEN SATURATION: 100 % | DIASTOLIC BLOOD PRESSURE: 72 MMHG | RESPIRATION RATE: 18 BRPM | WEIGHT: 111.13 LBS | TEMPERATURE: 98 F | HEIGHT: 60 IN | BODY MASS INDEX: 21.82 KG/M2

## 2024-07-24 DIAGNOSIS — R63.4 WEIGHT LOSS: Primary | ICD-10-CM

## 2024-07-24 LAB
ALBUMIN SERPL-MCNC: 3.9 G/DL (ref 3.4–4.8)
ALBUMIN/GLOB SERPL: 1.3 RATIO (ref 1.1–2)
ALP SERPL-CCNC: 70 UNIT/L (ref 40–150)
ALT SERPL-CCNC: 9 UNIT/L (ref 0–55)
ANION GAP SERPL CALC-SCNC: 7 MEQ/L
AST SERPL-CCNC: 20 UNIT/L (ref 5–34)
BACTERIA #/AREA URNS AUTO: NORMAL /HPF
BASOPHILS # BLD AUTO: 0.05 X10(3)/MCL
BASOPHILS NFR BLD AUTO: 0.8 %
BILIRUB SERPL-MCNC: 0.8 MG/DL
BILIRUB UR QL STRIP.AUTO: NEGATIVE
BUN SERPL-MCNC: 15.8 MG/DL (ref 9.8–20.1)
CALCIUM SERPL-MCNC: 9.7 MG/DL (ref 8.4–10.2)
CHLORIDE SERPL-SCNC: 99 MMOL/L (ref 98–107)
CLARITY UR: CLEAR
CO2 SERPL-SCNC: 30 MMOL/L (ref 23–31)
COLOR UR AUTO: NORMAL
CREAT SERPL-MCNC: 0.74 MG/DL (ref 0.55–1.02)
CREAT/UREA NIT SERPL: 21
EOSINOPHIL # BLD AUTO: 0.14 X10(3)/MCL (ref 0–0.9)
EOSINOPHIL NFR BLD AUTO: 2.2 %
ERYTHROCYTE [DISTWIDTH] IN BLOOD BY AUTOMATED COUNT: 12.4 % (ref 11.5–17)
GFR SERPLBLD CREATININE-BSD FMLA CKD-EPI: >60 ML/MIN/1.73/M2
GLOBULIN SER-MCNC: 3 GM/DL (ref 2.4–3.5)
GLUCOSE SERPL-MCNC: 82 MG/DL (ref 82–115)
GLUCOSE UR QL STRIP: NORMAL
HCT VFR BLD AUTO: 40.6 % (ref 37–47)
HGB BLD-MCNC: 14.1 G/DL (ref 12–16)
HGB UR QL STRIP: NEGATIVE
HOLD SPECIMEN: NORMAL
HYALINE CASTS #/AREA URNS LPF: NORMAL /LPF
IMM GRANULOCYTES # BLD AUTO: 0.02 X10(3)/MCL (ref 0–0.04)
IMM GRANULOCYTES NFR BLD AUTO: 0.3 %
KETONES UR QL STRIP: NEGATIVE
LEUKOCYTE ESTERASE UR QL STRIP: NEGATIVE
LYMPHOCYTES # BLD AUTO: 1.64 X10(3)/MCL (ref 0.6–4.6)
LYMPHOCYTES NFR BLD AUTO: 25.7 %
MCH RBC QN AUTO: 31.6 PG (ref 27–31)
MCHC RBC AUTO-ENTMCNC: 34.7 G/DL (ref 33–36)
MCV RBC AUTO: 91 FL (ref 80–94)
MONOCYTES # BLD AUTO: 0.5 X10(3)/MCL (ref 0.1–1.3)
MONOCYTES NFR BLD AUTO: 7.8 %
NEUTROPHILS # BLD AUTO: 4.03 X10(3)/MCL (ref 2.1–9.2)
NEUTROPHILS NFR BLD AUTO: 63.2 %
NITRITE UR QL STRIP: NEGATIVE
NRBC BLD AUTO-RTO: 0 %
PH UR STRIP: 7.5 [PH]
PLATELET # BLD AUTO: 303 X10(3)/MCL (ref 130–400)
PMV BLD AUTO: 9.8 FL (ref 7.4–10.4)
POCT GLUCOSE: 127 MG/DL (ref 70–110)
POTASSIUM SERPL-SCNC: 3.3 MMOL/L (ref 3.5–5.1)
PROT SERPL-MCNC: 6.9 GM/DL (ref 5.8–7.6)
PROT UR QL STRIP: NEGATIVE
RBC # BLD AUTO: 4.46 X10(6)/MCL (ref 4.2–5.4)
RBC #/AREA URNS AUTO: NORMAL /HPF
SODIUM SERPL-SCNC: 136 MMOL/L (ref 136–145)
SP GR UR STRIP.AUTO: 1.01 (ref 1–1.03)
SQUAMOUS #/AREA URNS LPF: NORMAL /HPF
TSH SERPL-ACNC: 1.14 UIU/ML (ref 0.35–4.94)
UROBILINOGEN UR STRIP-ACNC: NORMAL
WBC # BLD AUTO: 6.38 X10(3)/MCL (ref 4.5–11.5)
WBC #/AREA URNS AUTO: NORMAL /HPF

## 2024-07-24 PROCEDURE — 80053 COMPREHEN METABOLIC PANEL: CPT | Performed by: NURSE PRACTITIONER

## 2024-07-24 PROCEDURE — 99284 EMERGENCY DEPT VISIT MOD MDM: CPT | Mod: 25

## 2024-07-24 PROCEDURE — 82962 GLUCOSE BLOOD TEST: CPT

## 2024-07-24 PROCEDURE — 87177 OVA AND PARASITES SMEARS: CPT | Performed by: NURSE PRACTITIONER

## 2024-07-24 PROCEDURE — 84443 ASSAY THYROID STIM HORMONE: CPT | Performed by: NURSE PRACTITIONER

## 2024-07-24 PROCEDURE — 85025 COMPLETE CBC W/AUTO DIFF WBC: CPT | Performed by: NURSE PRACTITIONER

## 2024-07-24 PROCEDURE — 81001 URINALYSIS AUTO W/SCOPE: CPT | Performed by: NURSE PRACTITIONER

## 2024-07-24 PROCEDURE — 87209 SMEAR COMPLEX STAIN: CPT | Performed by: NURSE PRACTITIONER

## 2024-07-24 PROCEDURE — 81015 MICROSCOPIC EXAM OF URINE: CPT | Performed by: NURSE PRACTITIONER

## 2024-07-24 NOTE — ED PROVIDER NOTES
Encounter Date: 7/24/2024       History     Chief Complaint   Patient presents with    Weight Loss     Presents from home with c/o 10-15lb weight loss over the last 6-8 months without reason. No medical complaints, no reported change in diet or eating habits, reports normal energy levels, no nausea/vomiting/diarrhea. CBG in triage 127     78 year old with complaints of unintentional weight loss. She loss 10-15 lbs over the last 6-8 months. Nothing is different. She denies abdominal pain. Denies change in diet. Denies medication changes. Denies SOB or chest pain. She was wondering if her pancreas was infected due to a friend losing weight with a pancrease infection.    The history is provided by the patient. No  was used.     Review of patient's allergies indicates:  No Known Allergies  Past Medical History:   Diagnosis Date    GERD (gastroesophageal reflux disease)     Hyperlipidemia     Hypertension     Osteoporosis     Unspecified osteoarthritis, unspecified site      Past Surgical History:   Procedure Laterality Date    TUBAL LIGATION       No family history on file.  Social History     Tobacco Use    Smoking status: Never     Passive exposure: Never    Smokeless tobacco: Never   Substance Use Topics    Alcohol use: Never    Drug use: Never     Review of Systems   Constitutional:  Negative for fever.   HENT:  Negative for sore throat.    Respiratory:  Negative for shortness of breath.    Cardiovascular:  Negative for chest pain.   Gastrointestinal:  Negative for nausea.   Genitourinary:  Negative for dysuria.   Musculoskeletal:  Negative for back pain.   Skin:  Negative for rash.   Neurological:  Negative for weakness.   Hematological:  Does not bruise/bleed easily.       Physical Exam     Initial Vitals [07/24/24 0749]   BP Pulse Resp Temp SpO2   132/71 69 16 97.3 °F (36.3 °C) 97 %      MAP       --         Physical Exam    Nursing note and vitals reviewed.  Constitutional: She appears  well-developed and well-nourished.   HENT:   Head: Normocephalic and atraumatic.   Eyes: Conjunctivae and EOM are normal. Pupils are equal, round, and reactive to light.   Neck: Neck supple.   Normal range of motion.  Cardiovascular:  Normal rate and regular rhythm.           Pulmonary/Chest: Breath sounds normal. No respiratory distress.   Abdominal: Abdomen is soft. Bowel sounds are normal. She exhibits no distension. There is no abdominal tenderness.   Musculoskeletal:         General: No edema. Normal range of motion.      Cervical back: Normal range of motion and neck supple.     Neurological: She is alert and oriented to person, place, and time. She has normal strength.   Skin: Skin is warm and dry.   Psychiatric: Thought content normal.         ED Course   Procedures  Labs Reviewed   COMPREHENSIVE METABOLIC PANEL - Abnormal       Result Value    Sodium 136      Potassium 3.3 (*)     Chloride 99      CO2 30      Glucose 82      Blood Urea Nitrogen 15.8      Creatinine 0.74      Calcium 9.7      Protein Total 6.9      Albumin 3.9      Globulin 3.0      Albumin/Globulin Ratio 1.3      Bilirubin Total 0.8      ALP 70      ALT 9      AST 20      eGFR >60      Anion Gap 7.0      BUN/Creatinine Ratio 21     CBC WITH DIFFERENTIAL - Abnormal    WBC 6.38      RBC 4.46      Hgb 14.1      Hct 40.6      MCV 91.0      MCH 31.6 (*)     MCHC 34.7      RDW 12.4      Platelet 303      MPV 9.8      Neut % 63.2      Lymph % 25.7      Mono % 7.8      Eos % 2.2      Basophil % 0.8      Lymph # 1.64      Neut # 4.03      Mono # 0.50      Eos # 0.14      Baso # 0.05      IG# 0.02      IG% 0.3      NRBC% 0.0     POCT GLUCOSE - Abnormal    POCT Glucose 127 (*)    TSH - Normal    TSH 1.139     URINALYSIS - Normal    Color, UA Light-Yellow      Appearance, UA Clear      Specific Gravity, UA 1.012      pH, UA 7.5      Protein, UA Negative      Glucose, UA Normal      Ketones, UA Negative      Blood, UA Negative      Bilirubin, UA  Negative      Urobilinogen, UA Normal      Nitrites, UA Negative      Leukocyte Esterase, UA Negative      RBC, UA 0-5      WBC, UA 0-5      Bacteria, UA None Seen      Squamous Epithelial Cells, UA None Seen      Hyaline Casts, UA None Seen     CBC W/ AUTO DIFFERENTIAL    Narrative:     The following orders were created for panel order CBC auto differential.  Procedure                               Abnormality         Status                     ---------                               -----------         ------                     CBC with Differential[[id number]]       Abnormal            Final result                 Please view results for these tests on the individual orders.   STOOL EXAM-OVA,CYSTS,PARASITES   EXTRA TUBES    Narrative:     The following orders were created for panel order EXTRA TUBES.  Procedure                               Abnormality         Status                     ---------                               -----------         ------                     Light Blue Top Hold[[id number]]                             In process                 Gold Top Hold[[id number]]                                   In process                 Pink Top Hold[[id number]]                                   In process                   Please view results for these tests on the individual orders.   LIGHT BLUE TOP HOLD   GOLD TOP HOLD   PINK TOP HOLD          Imaging Results              X-Ray Chest PA And Lateral (Final result)  Result time 07/24/24 09:06:30      Final result by Sreekanth Farooq MD (07/24/24 09:06:30)                   Impression:      No acute cardiopulmonary abnormality.      Electronically signed by: Sreekanth Farooq  Date:    07/24/2024  Time:    09:06               Narrative:    EXAMINATION:  XR CHEST PA AND LATERAL    CLINICAL HISTORY:  unintentional weight loss;    COMPARISON:  18 June 2020    FINDINGS:  Frontal and lateral views of the chest were obtained. Heart is not enlarged.  There is aortic  atherosclerosis.  The lungs are clear.  No pneumothorax or sizable pleural effusion.                                       Medications - No data to display  Medical Decision Making  78 year old with complaints of unintentional weight loss. She loss 10-15 lbs over the last 6-8 months. Nothing is different. She denies abdominal pain. Denies change in diet. Denies medication changes. Denies SOB or chest pain. She was wondering if her pancreas was infected due to a friend losing weight with a pancrease infection.    Labs, xray reassuring. Follow up with PCP. Parasite screen will call with results      Amount and/or Complexity of Data Reviewed  External Data Reviewed: labs, radiology, ECG and notes.     Details: Internal medicine note from 5/15/24 concerning unintentional weight loss    CT Abdomen Pelvis  Without Contrast  Order: [id number]  Status: Final result       Visible to patient: Yes (not seen)       Next appt: 08/02/2024 at 07:30 AM in Cardiology ([facility])       Dx: Weight loss    0 Result Notes  Details      Reading Physician Reading Date Result Priority  Bello Nunez MD  [phone] 5/8/2024 Routine    Narrative & Impression  EXAMINATION:  CT ABDOMEN PELVIS WITHOUT CONTRAST     CLINICAL HISTORY:  Weight loss, unintended; Abnormal weight loss     TECHNIQUE:  Low dose axial images, sagittal and coronal reformations were obtained from the lung bases to the pubic symphysis.  No oral contrast was given.     Automatic exposure control (AEC) was utilized for dose reduction.     Dose: 258.07 by mGycm     COMPARISON:  03/04/2017     FINDINGS:  There are atelectatic changes in the lung bases.  Liver appears normal.  Spleen appears normal.  Pancreas is poorly delineated.  A definite abnormality is not demonstrated.  Biliary system appears normal.  The adrenals are not enlarged.  Kidneys appear normal.  Aorta shows calcification without an aneurysm present.  The appendix appears  normal.  There are calcifications in the uterus most consistent with a calcified leiomyoma.  There are diverticulum in the colon without evidence of acute diverticulitis.  There is what is felt to represent a pessary device in place.     Impression:     Dense vascular calcification.     No definite abnormalities are seen on this noncontrast exam.        Electronically signed by:Bello Nunez MD  Date:                                            05/08/2024  Time:                                           15:59      05/08/24 07:24  WBC: 6.85  RBC: 4.42  Hemoglobin: 13.8  Hematocrit: 40.7  MCV: 92.1  MCH: 31.2 (H)  MCHC: 33.9  RDW: 12.8  Platelet Count: 283  MPV: 9.8  Neut %: 59.5  LYMPH %: 27.9  Mono %: 7.4  Eos %: 3.9  Basophil %: 1.0  Immature Granulocytes: 0.3  Neut #: 4.07  Lymph #: 1.91  Mono #: 0.51  Eos #: 0.27  Baso #: 0.07  Immature Grans (Abs): 0.02  nRBC: 0.0  Sodium: 137  Potassium: 4.1  Chloride: 102  CO2: 29  BUN: 13.7  Creatinine: 0.81  eGFR: >60  Glucose: 93  Calcium: 9.4  ALP: 70  PROTEIN TOTAL: 6.8  Albumin: 3.7  Albumin/Globulin Ratio: 1.2  BILIRUBIN TOTAL: 0.7  AST: 18  ALT: 10  Globulin, Total: 3.1  Cholesterol Total: 214 (H)  HDL: 65 (H)  Total Cholesterol/HDL Ratio: 3  Triglycerides: 66  LDL Cholesterol: 136.00  Very Low Density Lipoprotein: 13  Hemoglobin A1C External: 5.5  Estimated Avg Glucose: 111.2  TSH: 1.637  Hep A IgM: Nonreactive  Hep B C IgM: Nonreactive  Hepatitis B Surface Ag: Nonreactive  Hepatitis C Ab: Nonreactive  HIV: Nonreactive      (H): Data is abnormally high  05/08/24 07:38  Color, UA: Light-Yellow  Appearance, UA: Clear  Specific Gravity,UA: 1.015  pH, UA: 7.5  Protein, UA: Negative  Glucose, UA: Normal  Ketones, UA: Negative  Blood, UA: Negative  NITRITE UA: Negative  Bilirubin (UA): Negative  Urobilinogen, UA: Normal  Leukocyte Esterase, UA: Negative  RBC, UA: 0-5  WBC, UA: 0-5  Bacteria, UA: Trace !  Squamous Epithelial Cells, UA: Occ !  Hyaline Casts, UA: None  Seen  Mucous, UA: Trace !  Urine Creatinine: 78.9  Urine Microalbumin: <5.0  MICROALB/CREAT RATIO: See Comments      !: Data is abnormal    Mammo Digital Screening Bilat w/ Nicolle  Order: [id number]  Status: Final result       Visible to patient: Yes (not seen)       Next appt: 08/02/2024 at 07:30 AM in Cardiology ([facility])       Dx: Encounter for screening mammogram for...    0 Result Notes  Details      Reading Physician Reading Date Result Priority  Julio Kaye MD  [phone] 4/15/2024       Physician Responsible for MQSA Outcome Reason   Julio Kaye MD Signed     Narrative & Impression      - MAMMO DIGITAL SCREENING BILAT WITH NICOLLE     BILATERAL DIGITAL SCREENING MAMMOGRAM 3D/2D WITH CAD: 4/15/2024  HISTORY: 78-year-old woman presents for screening mammogram.       COMPARISONS: Comparison is made to exams dated:  11/10/2022 mammogram, 7/27/2020 mammogram, 7/23/2019 mammogram, 5/1/2018 mammogram - [facility], 4/18/2017 mammogram, and 4/8/2016 mammogram - [facility].       TECHNIQUE: Digital mammography views were performed with tomosynthesis. Current study was evaluated with a Computer Aided Detection (CAD) system.      BREAST COMPOSITION: The breasts are heterogeneously dense, which may obscure small masses.       FINDINGS:   No suspicious mass, asymmetry, distortion, or calcification is identified.         IMPRESSION: NEGATIVE  Right Breast: Negative (BI-RADS 1)  Left Breast: Negative (BI-RADS 1)     Recommendations:  Recommend continued annual screening mammography (with Digital Breast Tomosynthesis), according to American College [employer] Rad[employer] guidelines.           Julio Kaye M.D.            lm/:4/15/2024 08:12:45          letter sent: Mammography Normal    Mammogram BI-RADS: 1 Negative          Labs: ordered. Decision-making details documented in ED Course.  Radiology: ordered. Decision-making details documented in ED  Course.    Risk  Risk Details: Risk Details: Given strict ED return precautions. I have spoken with the patient and/or caregivers. I have explained the patient's condition, diagnoses and treatment plan based on the information available to me at this time. I have answered the patient's and/or caregiver's questions and addressed any concerns. The patient and/or caregivers have as good an understanding of the patient's diagnosis, condition and treatment plan as can be expected at this point. The vital signs have been stable. The patient's condition is stable and appropriate for discharge from the emergency department.      The patient will pursue further outpatient evaluation with the primary care physician or other designated or consulting physician as outlined in the discharge instructions. The patient and/or caregivers are agreeable to this plan of care and follow-up instructions have been explained in detail. The patient and/or caregivers have received these instructions in written format and have expressed an understanding of the discharge instructions. The patient and/or caregivers are aware that any significant change in condition or worsening of symptoms should prompt an immediate return to this or the closest emergency department or a call to 911.           Additional MDM:   Differential Diagnosis:   Malignancy, hyperthyroid, deficient calories, psych disorder             ED Course as of 07/24/24 1020   Wed Jul 24, 2024   0910 X-Ray Chest PA And Lateral [LS]      ED Course User Index  [LS] Rosibel Wheatley, ALE                   It is important that you follow up with your primary care provider or specialist if indicated for further evaluation, workup, and treatment as necessary. The exam and treatment you received in Emergency Department was for an urgent problem and NOT INTENDED AS COMPLETE CARE. It is important that you FOLLOW UP with a doctor for ongoing care. If your symptoms become WORSE or you DO NOT  IMPROVE and you are unable to reach your health care provider, you should RETURN to the Emergency Department          Clinical Impression:  Final diagnoses:  [R63.4] Weight loss (Primary)          ED Disposition Condition    Discharge Stable          ED Prescriptions    None       Follow-up Information       Follow up With Specialties Details Why Contact Info    Lian Hill FNP Internal Medicine Schedule an appointment as soon as possible for a visit  As needed [id number] [facility]  Internal Medicine Clinic  [facility] [address]  [phone]               Rosibel Wheatley, ALE  07/24/24 1020

## 2024-07-29 LAB — O+P STL MICRO: NORMAL

## 2024-08-26 ENCOUNTER — HOSPITAL ENCOUNTER (OUTPATIENT)
Dept: RADIOLOGY | Facility: HOSPITAL | Age: 79
Discharge: HOME | End: 2024-08-26
Payer: MEDICARE

## 2024-08-26 ENCOUNTER — HOSPITAL ENCOUNTER (OUTPATIENT)
Dept: CARDIOLOGY | Facility: HOSPITAL | Age: 79
Discharge: HOME | End: 2024-08-26
Payer: MEDICARE

## 2024-08-26 VITALS
BODY MASS INDEX: 21.82 KG/M2 | WEIGHT: 111.13 LBS | DIASTOLIC BLOOD PRESSURE: 65 MMHG | SYSTOLIC BLOOD PRESSURE: 149 MMHG | HEART RATE: 64 BPM | RESPIRATION RATE: 20 BRPM | HEIGHT: 60 IN

## 2024-08-26 DIAGNOSIS — I70.90 ATHEROSCLEROSIS: Chronic | ICD-10-CM

## 2024-08-26 DIAGNOSIS — R94.31 ABNORMAL EKG: ICD-10-CM

## 2024-08-26 DIAGNOSIS — R07.9 CHEST PAIN, UNSPECIFIED TYPE: ICD-10-CM

## 2024-08-26 LAB
CV STRESS BASE HR: 64 BPM
DIASTOLIC BLOOD PRESSURE: 65 MMHG
NUC REST EJECTION FRACTION: 84
NUC STRESS EJECTION FRACTION: 94 %
OHS CV CPX 85 PERCENT MAX PREDICTED HEART RATE MALE: 119
OHS CV CPX ESTIMATED METS: 7
OHS CV CPX MAX PREDICTED HEART RATE: 140
OHS CV CPX PATIENT IS FEMALE: 1
OHS CV CPX PATIENT IS MALE: 0
OHS CV CPX PEAK DIASTOLIC BLOOD PRESSURE: 63 MMHG
OHS CV CPX PEAK HEAR RATE: 123 BPM
OHS CV CPX PEAK RATE PRESSURE PRODUCT: NORMAL
OHS CV CPX PEAK SYSTOLIC BLOOD PRESSURE: 210 MMHG
OHS CV CPX PERCENT MAX PREDICTED HEART RATE ACHIEVED: 91
OHS CV CPX RATE PRESSURE PRODUCT PRESENTING: 9536
OHS QRS DURATION: 76 MS
OHS QTC CALCULATION: 422 MS
STRESS ECHO POST EXERCISE DUR MIN: 6 MINUTES
STRESS ECHO POST EXERCISE DUR SEC: 1 SECONDS
SYSTOLIC BLOOD PRESSURE: 149 MMHG

## 2024-08-26 PROCEDURE — 93017 CV STRESS TEST TRACING ONLY: CPT

## 2024-08-26 PROCEDURE — A9502 TC99M TETROFOSMIN: HCPCS

## 2024-08-26 PROCEDURE — 78452 HT MUSCLE IMAGE SPECT MULT: CPT

## 2024-08-26 RX ADMIN — TETROFOSMIN 10.8 MILLICURIE: 1.38 INJECTION, POWDER, LYOPHILIZED, FOR SOLUTION INTRAVENOUS at 07:08

## 2024-08-26 RX ADMIN — TETROFOSMIN 31.1 MILLICURIE: 1.38 INJECTION, POWDER, LYOPHILIZED, FOR SOLUTION INTRAVENOUS at 08:08

## 2024-09-26 ENCOUNTER — OFFICE VISIT (OUTPATIENT)
Dept: OPHTHALMOLOGY | Facility: CLINIC | Age: 79
End: 2024-09-26
Payer: MEDICARE

## 2024-09-26 VITALS — WEIGHT: 110.25 LBS | HEIGHT: 60 IN | BODY MASS INDEX: 21.65 KG/M2

## 2024-09-26 DIAGNOSIS — H04.123 DRY EYE SYNDROME OF BOTH EYES: ICD-10-CM

## 2024-09-26 DIAGNOSIS — Z96.1 PSEUDOPHAKIA OF BOTH EYES: ICD-10-CM

## 2024-09-26 DIAGNOSIS — H26.493 BILATERAL POSTERIOR CAPSULAR OPACIFICATION: Primary | ICD-10-CM

## 2024-09-26 DIAGNOSIS — H35.3131 EARLY DRY STAGE NONEXUDATIVE AGE-RELATED MACULAR DEGENERATION OF BOTH EYES: ICD-10-CM

## 2024-09-26 PROCEDURE — 99213 OFFICE O/P EST LOW 20 MIN: CPT | Mod: PBBFAC,PN

## 2024-09-26 NOTE — PROGRESS NOTES
HPI    YAG FU? , do not see that patient had a ys, last visit 7/5/24    C/O tearing, burning  Last edited by Alejandra Disla RN on 9/26/2024  7:41 AM.            Assessment /Plan     For exam results, see Encounter Report.    There are no diagnoses linked to this encounter.    OCT mac  - 11/30/2023  OD: , intact foveal contour, no IRF/SRF  OS: , intact foveal contour, disruption of the OS junction subfoveally   - 07/11/2023:  OD: , intact foveal contour, no IRF/SRF  OS: , intact foveal contour, disruption of the OS junction subfoveally   - 9/30/2022:  OD:218 Good foveal contour with few drusen is os intact  OS: 202 Good foveal contour with few drusen; is os disruption at fovea  - 10/2021:  OD:213 Good foveal contour with few drusen is os intact  OS: 206 Good foveal contour with few drusen; is os disruption at fovea    MRX:  - 3/2021  OD: -0.5 +2.50 x 6  OS: -2.25 +3.25 x175  Add: +250    Patricio 3/30/21  OD: Flat SimK 40.99, steep simK 41.89, astig 1.8D at 6  OS; Flat SimK 40.22, Steep simK 42.44, astig 2.23D at 165      Assessment/Plan:     1) Pseudophakic, OU  - most recently s/p CE/IOL OS (9/24/2020)  - Likely residual astigmatism ( Patient decided to get basic lens instead of toric lens offered )  - difficult MRx previously, so patricio obtained 3/2021. With residual astigmatism OU  - Patient with new MRx 11/30/23, 20/25 // 20/30.     2. 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 Patient wishes for treatment after discussion of r/b/a. Will bring back for next available procedure day for YAG Cap OS.  - 7/5/24: Pt rescheduled YAG OS 6/26/24 to 7/24/24. Pt's vision is stable but still blurry OS  - 9/26/24: Patient never had YAG, missed appointments 2x, will bring back on procedure day for YAG, patient wants to start with OS then OD. Tentative plan for 2 months procedure day, patient says she has had trouble scheduling around work and will let us know.    3. KIKI 2/2  blephritis OU and lower eyleid laxity OU  - artificial tears 4-6 x/daily, WC, LS  - patient using TID, told to increase to 6x/day PFATs  - nighttime denise, discussed restarting gel or ointment    4. ARMD OU, mild  - Amsler grid daily  - No smoking, good diet and exercise encouraged  - OCT mac stable with no IRF/SRF  - annual DFE    5. Blepharoptosis OU  - not interested in surgery at this time, consider ptosis field in future    RTC in 2 months for procedure day YAG CAP OS

## 2024-09-27 RX ORDER — HYDROCHLOROTHIAZIDE 12.5 MG/1
12.5 TABLET ORAL DAILY
Qty: 60 TABLET | Refills: 0 | Status: SHIPPED | OUTPATIENT
Start: 2024-09-27

## 2024-09-27 RX ORDER — ALENDRONATE SODIUM 70 MG/1
70 TABLET ORAL
Qty: 8 TABLET | Refills: 0 | Status: SHIPPED | OUTPATIENT
Start: 2024-09-27

## 2024-09-27 NOTE — TELEPHONE ENCOUNTER
----- Message from Huong Soni sent at 9/27/2024  9:23 AM CDT -----  Regarding: refill  Pt need refill on Hydrochlorothiazide

## 2024-10-10 DIAGNOSIS — E78.2 MIXED HYPERLIPIDEMIA: Chronic | ICD-10-CM

## 2024-10-10 RX ORDER — SIMVASTATIN 10 MG/1
10 TABLET, FILM COATED ORAL NIGHTLY
Qty: 90 TABLET | Refills: 0 | Status: SHIPPED | OUTPATIENT
Start: 2024-10-10

## 2024-10-21 RX ORDER — LOSARTAN POTASSIUM 100 MG/1
100 TABLET ORAL DAILY
Qty: 90 TABLET | Refills: 0 | Status: SHIPPED | OUTPATIENT
Start: 2024-10-21

## 2024-10-21 NOTE — TELEPHONE ENCOUNTER
----- Message from Marybel sent at 10/21/2024  9:44 AM CDT -----  Regarding: med refill           Preferred Pharmacy: [facility]    Last Visit:  Next Visit: 11/18/24 Ladan  Patient's Contact Number:        1. Name of Medication: Losartan  Dosage:

## 2024-10-24 ENCOUNTER — OFFICE VISIT (OUTPATIENT)
Dept: CARDIOLOGY | Facility: CLINIC | Age: 79
End: 2024-10-24
Payer: MEDICARE

## 2024-10-24 VITALS
TEMPERATURE: 98 F | HEART RATE: 64 BPM | DIASTOLIC BLOOD PRESSURE: 59 MMHG | RESPIRATION RATE: 20 BRPM | HEIGHT: 60 IN | BODY MASS INDEX: 21.52 KG/M2 | SYSTOLIC BLOOD PRESSURE: 139 MMHG | OXYGEN SATURATION: 99 % | WEIGHT: 109.63 LBS

## 2024-10-24 DIAGNOSIS — E78.2 MIXED HYPERLIPIDEMIA: Chronic | ICD-10-CM

## 2024-10-24 DIAGNOSIS — I70.90 ATHEROSCLEROSIS: Chronic | ICD-10-CM

## 2024-10-24 DIAGNOSIS — I10 PRIMARY HYPERTENSION: Primary | Chronic | ICD-10-CM

## 2024-10-24 PROCEDURE — 99215 OFFICE O/P EST HI 40 MIN: CPT | Mod: PBBFAC

## 2024-10-24 PROCEDURE — 99214 OFFICE O/P EST MOD 30 MIN: CPT | Mod: S$PBB,,,

## 2024-10-24 NOTE — PROGRESS NOTES
CHIEF COMPLAINT:   Chief Complaint   Patient presents with    Follow-up     Denies Complaints  Denies chest pains or SOB                                                  HPI:  Kika Farias 78 y.o. female with a past medical history of hyperlipidemia, hypertension, GERD, prediabetes, insomnia, osteoarthritis, macular degeneration presents to Cardiology Clinic today for follow up and ongoing care.  She was referred here per PCP for incidental finding of atherosclerosis on CT scan.  There was evidence of atherosclerosis with in the aortic area with no evidence of any aortic aneurysm.  At initial visit, patient stated that she felt well from a cardiac standpoint, but stress test was ordered for further evaluation given the evidence of atherosclerosis on the CT scan.  Nuclear stress test revealed a normal myocardial perfusion scan, there was no evidence of myocardial ischemia or infarction.  See full report below.    Today the patient states she feels very well from a cardiac standpoint.  She denies any cardiac complaints today such as chest pain, SOB, UGARTE cough palpitations, PND, orthopnea, lightheadedness, dizziness, syncope, lower extremity edema, or claudication symptoms.  She is able to complete her ADLs without any issues or ischemic symptoms.  She was very active in her day-to-day life and she walks frequently throughout the day and for exercise.  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  She reports compliance with all her current medications and is tolerating them well.  She follows a heart healthy diet.  She denies any tobacco or other illicit drug use.                                                                                                                                                                                                                                                                               CARDIAC TESTING:  Nuclear Stress Test 8.26.24    Normal  myocardial perfusion scan. There is no evidence of myocardial ischemia or infarction.    The gated perfusion images showed an ejection fraction of 84% at rest. The gated perfusion images showed an ejection fraction of 94% post stress.    The patient reported no chest pain during the stress test.    There were no arrhythmias during stress.    The patient exercised for 6 minutes 1 seconds on a Evert protocol, corresponding to a functional capacity of 7METS, achieving a peak heart rate of 123 bpm, which is 91% of the age predicted maximum heart rate.  The nuclear stress test is  good quality.    On the nuclear stress test the EF is supranormal.  If the patient has an echo, the EF on the echo is considered more accurate.     The patient has a low risk nuclear stress test   Nuclear stress test indicates no ischemia.       Patient Active Problem List   Diagnosis    Early dry stage nonexudative age-related macular degeneration of both eyes    Dry eye syndrome of both eyes    Female genital prolapse    Gastroesophageal reflux disease    Hyperlipidemia    Hypertension    Osteoarthritis of left knee    Osteoporosis    Prediabetes    Primary osteoarthritis of right knee    Other insomnia    Weight loss    Atherosclerosis     Past Surgical History:   Procedure Laterality Date    TUBAL LIGATION       Social History     Socioeconomic History    Marital status: Single   Tobacco Use    Smoking status: Never     Passive exposure: Never    Smokeless tobacco: Never   Substance and Sexual Activity    Alcohol use: Never    Drug use: Never    Sexual activity: Yes     Partners: Male     Birth control/protection: See Surgical Hx     Social Drivers of Health     Food Insecurity: No Food Insecurity (9/21/2023)    Hunger Vital Sign     Worried About Running Out of Food in the Last Year: Never true     Ran Out of Food in the Last Year: Never true   Transportation Needs: No Transportation Needs (10/26/2022)    PRAPARE - Transportation     Lack  of Transportation (Medical): No     Lack of Transportation (Non-Medical): No   Stress: No Stress Concern Present (9/21/2023)    [employer] - Occupational Stress Questionnaire     Feeling of Stress : Not at all   Housing Stability: Low Risk  (10/26/2022)    Housing Stability Vital Sign     Unable to Pay for Housing in the Last Year: No     Number of Places Lived in the Last Year: 1     Unstable Housing in the Last Year: No        No family history on file.  Review of patient's allergies indicates:  No Known Allergies      ROS:  Review of Systems   Constitutional: Negative.  Negative for malaise/fatigue.   HENT: Negative.     Eyes: Negative.    Respiratory: Negative.  Negative for shortness of breath.    Cardiovascular: Negative.  Negative for chest pain (Occasional), palpitations, orthopnea, claudication, leg swelling and PND.   Gastrointestinal: Negative.    Genitourinary: Negative.    Musculoskeletal: Negative.    Skin: Negative.    Neurological: Negative.  Negative for weakness.   Endo/Heme/Allergies: Negative.    Psychiatric/Behavioral: Negative.                                                                                                                                                                                  Negative except as stated in the history of present illness. See HPI for details.    PHYSICAL EXAM:  Visit Vitals  BP (!) 139/59 (BP Location: Right arm, Patient Position: Sitting)   Pulse 64   Temp 97.9 °F (36.6 °C) (Oral)   Resp 20   Ht 5' (1.524 m)   Wt 49.7 kg (109 lb 9.6 oz)   SpO2 99%   BMI 21.40 kg/m²       Physical Exam  Constitutional:       Appearance: Normal appearance. She is not ill-appearing.   HENT:      Head: Normocephalic.      Nose: Nose normal.      Mouth/Throat:      Mouth: Mucous membranes are moist.   Eyes:      Extraocular Movements: Extraocular movements intact.   Neck:      Vascular: No carotid bruit.   Cardiovascular:      Rate and Rhythm:  Normal rate and regular rhythm.      Pulses: Normal pulses.      Heart sounds: Normal heart sounds. No murmur heard.  Pulmonary:      Effort: Pulmonary effort is normal.   Abdominal:      General: There is no distension.      Palpations: Abdomen is soft.   Musculoskeletal:         General: Normal range of motion.      Cervical back: Normal range of motion.      Right lower leg: No edema.      Left lower leg: No edema.   Skin:     General: Skin is warm.   Neurological:      General: No focal deficit present.      Mental Status: She is alert.   Psychiatric:         Mood and Affect: Mood normal.         Current Outpatient Medications   Medication Instructions    alendronate (FOSAMAX) 70 mg, Oral, Every 7 days    amLODIPine (NORVASC) 5 mg, Oral    calcium carbonate (OS-GERALDINE) 600 mg, Once    collagen/biotin/ascorbic acid (COLLAGEN 1500 PLUS C ORAL) Take by mouth.    hydroCHLOROthiazide (HYDRODIURIL) 12.5 mg, Oral, Daily    ibuprofen (ADVIL,MOTRIN) 800 mg, Every 8 hours    losartan (COZAAR) 100 mg, Oral, Daily    omeprazole (PRILOSEC) 20 mg, Oral, Daily PRN    PREMARIN 1 g, Vaginal, Daily    simvastatin (ZOCOR) 10 mg, Oral, Nightly        All medications, laboratory studies, cardiac diagnostic imaging reviewed.     Lab Results   Component Value Date    .00 05/08/2024    .00 11/16/2022    TRIG 66 05/08/2024    TRIG 81 11/16/2022    CREATININE 0.74 07/24/2024    MG 2.1 05/27/2018    K 3.3 (L) 07/24/2024        ASSESSMENT/PLAN:    Atherosclerosis  Asymptomatic  NORMAL Nuclear Stress Test  - Completely asymptomatic, denies all cardiac complaints and symptoms today   - Incidental finding of atherosclerosis in aorta on CT scan-there was no evidence of aortic aneurysm on that scan   - She does have several CAD risk factors including hypertension, hyperlipidemia, prediabetes  - Exercise nuclear stress test revealed an underlying sinus rhythm, no evidence of myocardial ischemia or infarction.  See full report above  -  Given that patient was completely asymptomatic, no indication for further testing at this time  - We will continue with good blood pressure and cholesterol control and overall risk factor modification   - Advised patient to notify clinic if any new concerns or any change in symptoms    HTN  - BP at goal 139/59  - Continue losartan 100 mg daily, HCTZ 12.5 mg daily, amlodipine 5 mg daily   - Counseled on the importance of following a low-sodium, heart healthy diet and exercise as tolerated    HLD  -  per labs May 2024  - Continue Simvastatin 10 MG daily  - Repeat FLP ordered per PCP - will defer to them for follow up   - Counseled on the importance of following a low-cholesterol, low-fat diet and exercise as tolerated    Prediabetes  - Management per PCP      Follow up in cardiology clinic in 9 months or sooner if needed   Follow up with PCP as directed   Please notify clinic if any new concerns or any change in symptoms

## 2024-11-06 ENCOUNTER — HOSPITAL ENCOUNTER (EMERGENCY)
Facility: HOSPITAL | Age: 79
Discharge: HOME | End: 2024-11-06
Attending: STUDENT IN AN ORGANIZED HEALTH CARE EDUCATION/TRAINING PROGRAM
Payer: MEDICARE

## 2024-11-06 VITALS
WEIGHT: 112.88 LBS | DIASTOLIC BLOOD PRESSURE: 68 MMHG | SYSTOLIC BLOOD PRESSURE: 143 MMHG | OXYGEN SATURATION: 98 % | BODY MASS INDEX: 22.16 KG/M2 | HEIGHT: 60 IN | HEART RATE: 63 BPM | TEMPERATURE: 98 F | RESPIRATION RATE: 18 BRPM

## 2024-11-06 DIAGNOSIS — R42 DIZZINESS: Primary | ICD-10-CM

## 2024-11-06 LAB
ALBUMIN SERPL-MCNC: 3.7 G/DL (ref 3.4–4.8)
ALBUMIN/GLOB SERPL: 1.3 RATIO (ref 1.1–2)
ALP SERPL-CCNC: 59 UNIT/L (ref 40–150)
ALT SERPL-CCNC: 7 UNIT/L (ref 0–55)
ANION GAP SERPL CALC-SCNC: 5 MEQ/L
AST SERPL-CCNC: 17 UNIT/L (ref 5–34)
BASOPHILS # BLD AUTO: 0.05 X10(3)/MCL
BASOPHILS NFR BLD AUTO: 0.6 %
BILIRUB SERPL-MCNC: 0.7 MG/DL
BUN SERPL-MCNC: 14 MG/DL (ref 9.8–20.1)
CALCIUM SERPL-MCNC: 9.5 MG/DL (ref 8.4–10.2)
CHLORIDE SERPL-SCNC: 97 MMOL/L (ref 98–107)
CO2 SERPL-SCNC: 31 MMOL/L (ref 23–31)
CREAT SERPL-MCNC: 0.84 MG/DL (ref 0.55–1.02)
CREAT/UREA NIT SERPL: 17
EOSINOPHIL # BLD AUTO: 0.05 X10(3)/MCL (ref 0–0.9)
EOSINOPHIL NFR BLD AUTO: 0.6 %
ERYTHROCYTE [DISTWIDTH] IN BLOOD BY AUTOMATED COUNT: 12.1 % (ref 11.5–17)
GFR SERPLBLD CREATININE-BSD FMLA CKD-EPI: >60 ML/MIN/1.73/M2
GLOBULIN SER-MCNC: 2.8 GM/DL (ref 2.4–3.5)
GLUCOSE SERPL-MCNC: 119 MG/DL (ref 82–115)
HCT VFR BLD AUTO: 41.4 % (ref 37–47)
HGB BLD-MCNC: 14.5 G/DL (ref 12–16)
HOLD SPECIMEN: NORMAL
IMM GRANULOCYTES # BLD AUTO: 0.02 X10(3)/MCL (ref 0–0.04)
IMM GRANULOCYTES NFR BLD AUTO: 0.2 %
LYMPHOCYTES # BLD AUTO: 1.17 X10(3)/MCL (ref 0.6–4.6)
LYMPHOCYTES NFR BLD AUTO: 14.5 %
MAGNESIUM SERPL-MCNC: 2.1 MG/DL (ref 1.6–2.6)
MCH RBC QN AUTO: 32.1 PG (ref 27–31)
MCHC RBC AUTO-ENTMCNC: 35 G/DL (ref 33–36)
MCV RBC AUTO: 91.6 FL (ref 80–94)
MONOCYTES # BLD AUTO: 0.62 X10(3)/MCL (ref 0.1–1.3)
MONOCYTES NFR BLD AUTO: 7.7 %
NEUTROPHILS # BLD AUTO: 6.15 X10(3)/MCL (ref 2.1–9.2)
NEUTROPHILS NFR BLD AUTO: 76.4 %
NRBC BLD AUTO-RTO: 0 %
OHS QRS DURATION: 82 MS
OHS QTC CALCULATION: 402 MS
PLATELET # BLD AUTO: 298 X10(3)/MCL (ref 130–400)
PMV BLD AUTO: 9.8 FL (ref 7.4–10.4)
POTASSIUM SERPL-SCNC: 4 MMOL/L (ref 3.5–5.1)
PROT SERPL-MCNC: 6.5 GM/DL (ref 5.8–7.6)
RBC # BLD AUTO: 4.52 X10(6)/MCL (ref 4.2–5.4)
SODIUM SERPL-SCNC: 133 MMOL/L (ref 136–145)
TROPONIN I SERPL-MCNC: <0.01 NG/ML (ref 0–0.04)
WBC # BLD AUTO: 8.06 X10(3)/MCL (ref 4.5–11.5)

## 2024-11-06 PROCEDURE — 99285 EMERGENCY DEPT VISIT HI MDM: CPT | Mod: 25

## 2024-11-06 PROCEDURE — 85025 COMPLETE CBC W/AUTO DIFF WBC: CPT | Performed by: STUDENT IN AN ORGANIZED HEALTH CARE EDUCATION/TRAINING PROGRAM

## 2024-11-06 PROCEDURE — 93005 ELECTROCARDIOGRAM TRACING: CPT

## 2024-11-06 PROCEDURE — 84484 ASSAY OF TROPONIN QUANT: CPT | Performed by: STUDENT IN AN ORGANIZED HEALTH CARE EDUCATION/TRAINING PROGRAM

## 2024-11-06 PROCEDURE — 83735 ASSAY OF MAGNESIUM: CPT | Performed by: STUDENT IN AN ORGANIZED HEALTH CARE EDUCATION/TRAINING PROGRAM

## 2024-11-06 PROCEDURE — 80053 COMPREHEN METABOLIC PANEL: CPT | Performed by: STUDENT IN AN ORGANIZED HEALTH CARE EDUCATION/TRAINING PROGRAM

## 2024-11-06 NOTE — ED PROVIDER NOTES
Encounter Date: 11/6/2024       History     Chief Complaint   Patient presents with    Dizziness     Pt states approx 45 PTA she was making breakfastbecame dizzy and fell hitting her head on the table. - Blood thinner; pt is AAO x 4, not in distress, no obvious injuries.      Patient presents to the emergency department after a fall.  She states she was stood up, became lightheaded and fell over.  No loss of consciousness but she did strike her head.  On no anticoagulation.  Currently complaining of a headache.  No current chest pain shortness of breath.    The history is provided by the patient.     Review of patient's allergies indicates:  No Known Allergies  Past Medical History:   Diagnosis Date    GERD (gastroesophageal reflux disease)     Hyperlipidemia     Hypertension     Osteoporosis     Unspecified osteoarthritis, unspecified site      Past Surgical History:   Procedure Laterality Date    TUBAL LIGATION       No family history on file.  Social History     Tobacco Use    Smoking status: Never     Passive exposure: Never    Smokeless tobacco: Never   Substance Use Topics    Alcohol use: Never    Drug use: Never     Review of Systems   Constitutional:  Negative for chills and fever.   HENT:  Negative for congestion and sore throat.    Respiratory:  Negative for cough and shortness of breath.    Cardiovascular:  Negative for chest pain and palpitations.   Gastrointestinal:  Negative for abdominal pain and nausea.   Genitourinary:  Negative for dysuria and hematuria.   Musculoskeletal:  Negative for arthralgias and myalgias.   Neurological:  Positive for headaches. Negative for dizziness and weakness.       Physical Exam     Initial Vitals [11/06/24 1145]   BP Pulse Resp Temp SpO2   138/64 71 18 97.9 °F (36.6 °C) 97 %      MAP       --         Physical Exam    Nursing note and vitals reviewed.  Constitutional: She appears well-developed and well-nourished.   HENT:   Head: Normocephalic and atraumatic.   Eyes:  EOM are normal. Pupils are equal, round, and reactive to light.   Neck: Neck supple.   Normal range of motion.  Cardiovascular:  Normal rate, regular rhythm and normal heart sounds.           Pulmonary/Chest: Breath sounds normal. No respiratory distress.   Abdominal: Abdomen is soft. There is no abdominal tenderness.   Musculoskeletal:         General: No edema. Normal range of motion.      Cervical back: Normal range of motion and neck supple.     Neurological: She is alert and oriented to person, place, and time.   Skin: Skin is warm and dry.         ED Course   Procedures  Labs Reviewed   COMPREHENSIVE METABOLIC PANEL - Abnormal       Result Value    Sodium 133 (*)     Potassium 4.0      Chloride 97 (*)     CO2 31      Glucose 119 (*)     Blood Urea Nitrogen 14.0      Creatinine 0.84      Calcium 9.5      Protein Total 6.5      Albumin 3.7      Globulin 2.8      Albumin/Globulin Ratio 1.3      Bilirubin Total 0.7      ALP 59      ALT 7      AST 17      eGFR >60      Anion Gap 5.0      BUN/Creatinine Ratio 17     CBC WITH DIFFERENTIAL - Abnormal    WBC 8.06      RBC 4.52      Hgb 14.5      Hct 41.4      MCV 91.6      MCH 32.1 (*)     MCHC 35.0      RDW 12.1      Platelet 298      MPV 9.8      Neut % 76.4      Lymph % 14.5      Mono % 7.7      Eos % 0.6      Basophil % 0.6      Lymph # 1.17      Neut # 6.15      Mono # 0.62      Eos # 0.05      Baso # 0.05      IG# 0.02      IG% 0.2      NRBC% 0.0     MAGNESIUM - Normal    Magnesium Level 2.10     TROPONIN I - Normal    Troponin-I <0.010     CBC W/ AUTO DIFFERENTIAL    Narrative:     The following orders were created for panel order CBC auto differential.  Procedure                               Abnormality         Status                     ---------                               -----------         ------                     CBC with Differential[[id number]]       Abnormal            Final result                 Please view results for these tests on the  individual orders.   EXTRA TUBES    Narrative:     The following orders were created for panel order EXTRA TUBES.  Procedure                               Abnormality         Status                     ---------                               -----------         ------                     Light Blue Top Hold[[id number]]                             Final result               Lavender Top Hold[[id number]]                               Final result               Gold Top Hold[[id number]]                                   Final result                 Please view results for these tests on the individual orders.   LIGHT BLUE TOP HOLD    Extra Tube Hold for add-ons.     LAVENDER TOP HOLD    Extra Tube Hold for add-ons.     GOLD TOP HOLD    Extra Tube Hold for add-ons.       EKG Readings: (Independently Interpreted)   Initial Reading: No STEMI. Rhythm: Normal Sinus Rhythm. Heart Rate: 61. Ectopy: No Ectopy. Conduction: Normal. Axis: Normal.     ECG Results              EKG 12-lead (Final result)        Collection Time Result Time QRS Duration OHS QTC Calculation    11/06/24 12:05:12 11/06/24 17:10:05 82 402                     Final result by Interface, Lab In [facility] (11/06/24 17:10:12)                   Narrative:    Test Reason : R55,    Vent. Rate : 061 BPM     Atrial Rate : 061 BPM     P-R Int : 164 ms          QRS Dur : 082 ms      QT Int : 400 ms       P-R-T Axes : 039 028 051 degrees     QTc Int : 402 ms    Normal sinus rhythm  Septal infarct (cited on or before 10-JUL-2024)  Abnormal ECG    Confirmed by Enoc Gomez MD ([id number]) on 11/6/2024 5:10:03 PM    Referred By:             Confirmed By:Enoc Gomez MD                                     EKG 12-lead (Final result)  Result time 11/18/24 10:11:08      Final result by Unknown User (11/18/24 10:11:08)                                      Imaging Results              X-Ray Chest AP Portable (Final result)  Result time 11/06/24 12:53:13      Final result by  Boo Brown MD (11/06/24 12:53:13)                   Impression:      No acute cardiopulmonary process.      Electronically signed by: Boo Brown  Date:    11/06/2024  Time:    12:53               Narrative:    EXAMINATION:  XR CHEST AP PORTABLE    CLINICAL HISTORY:  Weakness;    TECHNIQUE:  Single view of the chest    COMPARISON:  07/24/2024    FINDINGS:  No focal opacification, pleural effusion, or pneumothorax.    The cardiomediastinal silhouette is within normal limits.    No acute osseous abnormality.                                       CT Head Without Contrast (Final result)  Result time 11/06/24 12:50:53      Final result by Cristal Tran MD (11/06/24 12:50:53)                   Impression:      1. No acute intracranial abnormality.  2. Chronic microvascular ischemic changes.  3. Nondisplaced right nasal bone fracture.      Electronically signed by: Cristal Tran  Date:    11/06/2024  Time:    12:50               Narrative:    EXAMINATION:  CT HEAD WITHOUT CONTRAST    CLINICAL HISTORY:  Head trauma, minor (Age >= 65y);    TECHNIQUE:  Axial scans were obtained from skull base to the vertex.    Coronal and sagittal reconstructions obtained from the axial data.    Automatic exposure control was utilized to limit radiation dose.    Contrast: None    Radiation Dose:    Total DLP: 12 14 mGy*cm    COMPARISON:  CT head dated 04/13/2023    FINDINGS:  There is no acute intracranial hemorrhage or edema. The gray-white matter differentiation is preserved.  Scattered hypodensities in the subcortical and periventricular white matter likely represent chronic microvascular ischemic changes.    There is no mass effect or midline shift.  There is diffuse parenchymal volume loss.  The basal cisterns are patent. There is no abnormal extra-axial fluid collection.  Carotid artery calcifications are noted.    The calvarium and skull base are intact.  There is a nondisplaced right nasal bone fracture.  The  mastoid air cells are clear.                                       CT Cervical Spine Without Contrast (Final result)  Result time 11/06/24 12:48:22      Final result by Cristal Tran MD (11/06/24 12:48:22)                   Impression:      No acute fracture identified.      Electronically signed by: Cristal Tran  Date:    11/06/2024  Time:    12:48               Narrative:    EXAMINATION:  CT CERVICAL SPINE WITHOUT CONTRAST    CLINICAL HISTORY:  Neck trauma (Age >= 65y);    TECHNIQUE:  Noncontrast CT images of the cervical spine. Axial, coronal, and sagittal reformatted images were obtained. Dose length product is 12 14 mGycm. Automatic exposure control, adjustment of mA/kV or iterative reconstruction technique was used to limit radiation dose.    COMPARISON:  CT cervical spine dated 04/13/2024    FINDINGS:  The cervical spine is visualized through the level of T2.    There is no acute fracture identified.  There are multilevel degenerative changes with disc height loss, marginal osteophyte formation and facet arthropathy.  There is no paraspinal hematoma.                                       Medications - No data to display  Medical Decision Making  Vital signs are stable.  CT imaging without significant injuries.  EKG without acute ischemic changes.  CBC, CMP were reassuring overall reassuring workup, will discharge to follow up closely with the primary care physician    Amount and/or Complexity of Data Reviewed  Labs: ordered. Decision-making details documented in ED Course.  Radiology: ordered. Decision-making details documented in ED Course.  ECG/medicine tests: ordered and independent interpretation performed. Decision-making details documented in ED Course.                                      Clinical Impression:  Final diagnoses:  [R42] Dizziness (Primary)          ED Disposition Condition    Discharge Stable          ED Prescriptions    None       Follow-up Information       Follow up With  Specialties Details Why Contact Info    [facility] Emergency Dept Emergency Medicine Go to  If symptoms worsen [address] [facility] [address] [phone]             Wan Cummings MD  12/10/24 [id number]

## 2024-11-08 DIAGNOSIS — K21.9 GASTROESOPHAGEAL REFLUX DISEASE WITHOUT ESOPHAGITIS: ICD-10-CM

## 2024-11-11 RX ORDER — OMEPRAZOLE 20 MG/1
CAPSULE, DELAYED RELEASE ORAL
Qty: 90 CAPSULE | Refills: 0 | Status: SHIPPED | OUTPATIENT
Start: 2024-11-11

## 2024-11-14 NOTE — PROGRESS NOTES
Latoya Pickering, ALE   [facility]  [address] [address]      PATIENT NAME: Kika Farias  : 1945  DATE: 24  MRN: [id number]      Patient PCP Information       None on File            Reason for Visit / Chief Complaint: Hypertension (Per  Jose ID # [id number],  pt stated she is here for f/ u on HTN.)       History of Present Illness / Problem Focused Workflow     Kika Farias presents to the clinic with Hypertension (Per  Jose ID # [id number],  pt stated she is here for f/ u on HTN.)     78 y.o. [place] female presents to the clinic. Medical problems include HTN, HLD, prediabetes, macular degeneration, osteoporosis, left knee OA, uterine prolapse and ureteral carbuncle. Pt followed by [facility] GYN, audiology and optho clinics.     24   201318 Jose used throughout visit. Pt presents for HTN, pre DM and Hld follow up, labs incomplete. Blood pressure at goal. She is compliant with medications. She denies acute complaints. Pt with recent ED visit for dizziness and fall. Head CT and cardio work up WNL, no issues since. Agreeable to flu vaccine. RTC 6 months with labs          Review of Systems     Review of Systems   Constitutional:  Negative for fatigue, fever and unexpected weight change.   HENT:  Negative for ear pain, hearing loss, trouble swallowing and voice change.    Respiratory:  Negative for cough and shortness of breath.    Cardiovascular:  Negative for chest pain and palpitations.   Gastrointestinal:  Negative for abdominal pain, diarrhea and vomiting.   Genitourinary:  Negative for dysuria.   Musculoskeletal:  Negative for gait problem.   Skin:  Negative for rash and wound.   Neurological:  Negative for weakness.   Psychiatric/Behavioral:  Negative for suicidal ideas.          Medications and Allergies     Medications  Current Outpatient Medications   Medication Instructions     alendronate (FOSAMAX) 70 mg, Oral, Every 7 days    amLODIPine (NORVASC) 5 mg, Oral, Daily    calcium carbonate (OS-GERALDINE) 600 mg, Once    collagen/biotin/ascorbic acid (COLLAGEN 1500 PLUS C ORAL) Take by mouth.    hydroCHLOROthiazide (HYDRODIURIL) 12.5 mg, Oral, Daily    ibuprofen (ADVIL,MOTRIN) 800 mg, Every 8 hours    losartan (COZAAR) 100 mg, Oral, Daily    omeprazole (PRILOSEC) 20 mg, Oral, Daily    PREMARIN 1 g, Vaginal, Daily    simvastatin (ZOCOR) 10 mg, Oral, Nightly         Allergies  Review of patient's allergies indicates:  No Known Allergies    Physical Examination     Visit Vitals  /67 (BP Location: Right arm, Patient Position: Sitting)   Pulse 64   Resp 18   Ht 5' (1.524 m)   Wt 50.4 kg (111 lb 1.6 oz)   SpO2 99%   BMI 21.70 kg/m²       Physical Exam  Vitals and nursing note reviewed.   Constitutional:       General: She is not in acute distress.     Appearance: She is not ill-appearing.   HENT:      Head: Normocephalic and atraumatic.      Mouth/Throat:      Mouth: Mucous membranes are moist.      Pharynx: Oropharynx is clear.   Eyes:      General: No scleral icterus.     Extraocular Movements: Extraocular movements intact.      Conjunctiva/sclera: Conjunctivae normal.      Pupils: Pupils are equal, round, and reactive to light.   Neck:      Vascular: No carotid bruit.   Cardiovascular:      Rate and Rhythm: Normal rate and regular rhythm.      Heart sounds: No murmur heard.     No friction rub. No gallop.   Pulmonary:      Effort: Pulmonary effort is normal. No respiratory distress.      Breath sounds: Normal breath sounds. No wheezing, rhonchi or rales.   Abdominal:      General: Abdomen is flat. Bowel sounds are normal. There is no distension.      Palpations: Abdomen is soft. There is no mass.      Tenderness: There is no abdominal tenderness.   Musculoskeletal:         General: Normal range of motion.      Cervical back: Normal range of motion and neck supple.   Skin:     General: Skin is  warm and dry.   Neurological:      General: No focal deficit present.      Mental Status: She is alert.   Psychiatric:         Mood and Affect: Mood normal.           Results     Lab Results   Component Value Date    WBC 8.06 11/06/2024    RBC 4.52 11/06/2024    HGB 14.5 11/06/2024    HCT 41.4 11/06/2024    MCV 91.6 11/06/2024    MCH 32.1 (H) 11/06/2024    MCHC 35.0 11/06/2024    RDW 12.1 11/06/2024     11/06/2024    MPV 9.8 11/06/2024     CMP  Sodium   Date Value Ref Range Status   11/06/2024 133 (L) 136 - 145 mmol/L Final     Potassium   Date Value Ref Range Status   11/06/2024 4.0 3.5 - 5.1 mmol/L Final     Chloride   Date Value Ref Range Status   11/06/2024 97 (L) 98 - 107 mmol/L Final     CO2   Date Value Ref Range Status   11/06/2024 31 23 - 31 mmol/L Final     Blood Urea Nitrogen   Date Value Ref Range Status   11/06/2024 14.0 9.8 - 20.1 mg/dL Final     Creatinine   Date Value Ref Range Status   11/06/2024 0.84 0.55 - 1.02 mg/dL Final     Calcium   Date Value Ref Range Status   11/06/2024 9.5 8.4 - 10.2 mg/dL Final     Albumin   Date Value Ref Range Status   11/06/2024 3.7 3.4 - 4.8 g/dL Final     Bilirubin Total   Date Value Ref Range Status   11/06/2024 0.7 <=1.5 mg/dL Final     ALP   Date Value Ref Range Status   11/06/2024 59 40 - 150 unit/L Final     AST   Date Value Ref Range Status   11/06/2024 17 5 - 34 unit/L Final     ALT   Date Value Ref Range Status   11/06/2024 7 0 - 55 unit/L Final     Estimated GFR-Non    Date Value Ref Range Status   11/29/2021 89 (L) >>=90 mL/min/1.73 m2 Final     Lab Results   Component Value Date    CHOL 214 (H) 05/08/2024     Lab Results   Component Value Date    HDL 65 (H) 05/08/2024     Lab Results   Component Value Date    TRIG 66 05/08/2024     Lab Results   Component Value Date    VLDL 13 05/08/2024     Lab Results   Component Value Date    .00 05/08/2024     Lab Results   Component Value Date    TSH 1.139 07/24/2024         Assessment         ICD-10-CM ICD-9-CM   1. Mixed hyperlipidemia  E78.2 272.2   2. Primary hypertension  I10 401.9   3. Prediabetes  R73.03 790.29   4. Influenza vaccine needed  Z23 V04.81   5. Hypertension, unspecified type  I10 401.9   6. Gastroesophageal reflux disease without esophagitis  K21.9 530.81   7. Age-related osteoporosis without current pathological fracture  M81.0 733.01        Plan      Problem List Items Addressed This Visit       Gastroesophageal reflux disease (Chronic)    Current Assessment & Plan     Omeprazole refilled  Avoid spicy, acidic, fried foods and alcohol.  Eat 2-3 hours before going to bed.  Avoid tight clothing, chew food thoroughly.  Reduce caffeine intake, avoid soda.  Stressed importance of Smoking/Tobacco Cessation.           Relevant Medications    omeprazole (PRILOSEC) 20 MG capsule    Hyperlipidemia - Primary (Chronic)    Current Assessment & Plan     Flp ordered  Continue simvastatin 10mg  Stressed importance of dietary modifications. Follow a low cholesterol, low saturated fat diet with less that 200mg of cholesterol a day.  Avoid fried foods and high saturated fats (high saturated fats less than 7% of calories).  Add Flax Seed/Fish Oil supplements to diet. Increase dietary fiber.  Regular exercise can reduce LDL and raise HDL. Stressed importance of physical activity 5 times per week for 30 minutes per day.            Relevant Medications    simvastatin (ZOCOR) 10 MG tablet    Other Relevant Orders    Lipid Panel    Hypertension (Chronic)    Current Assessment & Plan     At goal.  Continue HCTZ, amlodipine, losartan  Low Sodium Diet (DASH Diet - Less than 2 grams of sodium per day).  Monitor blood pressure daily and log. Report consistent numbers greater than 140/90.  Maintain healthy weight with goal BMI <30. Exercise 30 minutes per day, 5 days per week.  Smoking cessation encouraged to aid in BP reduction.           Relevant Medications    hydroCHLOROthiazide (HYDRODIURIL) 12.5 MG Tab     amLODIPine (NORVASC) 5 MG tablet    losartan (COZAAR) 100 MG tablet    Other Relevant Orders    Urinalysis, Reflex to Urine Culture    Comprehensive Metabolic Panel    Osteoporosis (Chronic)    Current Assessment & Plan     Continue RX Fosamax weekly  Continue OTC Vitamin D & Calcium  Take alone on an empty stomach first thing in the morning with at least 240 mL (8 oz) of water. After administration,no food, drink, medications, or supplements for at least one half-hour.  The reason for taking 8 oz of water is to minimize the risk of the tablet getting stuck in the esophagus. The reason for taking the medication while fasting and waiting one half-hour until eating or drinking is that bioavailability may be seriously impaired by ingestion with liquids other than plain water, such as mineral water, coffee, or juice; by retained gastric contents, as with insufficient fasting time or gastroparesis; or by eating or drinking too soon afterwards.  Patients should remain upright (sitting or standing) for at least 30 minutes after administration to minimize the risk of reflux.  Contact clinic with any signs of unresolved GI symptoms or Jaw pain.            Relevant Medications    alendronate (FOSAMAX) 70 MG tablet    Prediabetes (Chronic)    Current Assessment & Plan     A1C ordered  Follow ADA Diet. Avoid soda, simple sweets, and limit rice/pasta/breads/starches (no more than 45-50 grams per meal).  Maintain healthy weight with goal BMI <30.  Exercise 5 times per week for 30 minutes per day.         Relevant Orders    Urinalysis, Reflex to Urine Culture    Comprehensive Metabolic Panel    Hemoglobin A1C     Other Visit Diagnoses       Influenza vaccine needed        Relevant Medications    influenza (Flulaval, Fluzone, Fluarix) 45 mcg/0.5 mL IM vaccine (> or = 6 mo) 0.5 mL (Start on 11/18/2024  8:30 AM)            Future Appointments   Date Time Provider Department Center   11/18/2024 10:30 AM [facility], [facility]  [facility]   11/27/2024  7:30 AM [facility]   12/4/2024 12:30 PM [facility], [facility] [place] [facility]   5/19/2025  8:20 AM Latoya Pickering, ALE [facility]   7/24/2025  8:45 AM Ya Villegas PA-C [facility]        Follow up in about 6 months (around 5/18/2025) for HTN, HLD, prediabetes, With labwork prior to visit.      Signature:      [facility]  [address] [address]    Date of encounter: 11/18/24

## 2024-11-18 ENCOUNTER — OFFICE VISIT (OUTPATIENT)
Dept: GYNECOLOGY | Facility: CLINIC | Age: 79
End: 2024-11-18
Payer: MEDICARE

## 2024-11-18 ENCOUNTER — OFFICE VISIT (OUTPATIENT)
Dept: INTERNAL MEDICINE | Facility: CLINIC | Age: 79
End: 2024-11-18
Payer: MEDICARE

## 2024-11-18 VITALS
WEIGHT: 110.19 LBS | OXYGEN SATURATION: 100 % | HEIGHT: 61 IN | RESPIRATION RATE: 18 BRPM | TEMPERATURE: 98 F | SYSTOLIC BLOOD PRESSURE: 171 MMHG | DIASTOLIC BLOOD PRESSURE: 75 MMHG | BODY MASS INDEX: 20.8 KG/M2 | HEART RATE: 61 BPM

## 2024-11-18 VITALS
SYSTOLIC BLOOD PRESSURE: 136 MMHG | OXYGEN SATURATION: 99 % | BODY MASS INDEX: 21.82 KG/M2 | DIASTOLIC BLOOD PRESSURE: 67 MMHG | WEIGHT: 111.13 LBS | HEIGHT: 60 IN | RESPIRATION RATE: 18 BRPM | HEART RATE: 64 BPM

## 2024-11-18 DIAGNOSIS — I10 HYPERTENSION, UNSPECIFIED TYPE: ICD-10-CM

## 2024-11-18 DIAGNOSIS — E78.2 MIXED HYPERLIPIDEMIA: Primary | Chronic | ICD-10-CM

## 2024-11-18 DIAGNOSIS — K21.9 GASTROESOPHAGEAL REFLUX DISEASE WITHOUT ESOPHAGITIS: ICD-10-CM

## 2024-11-18 DIAGNOSIS — M81.0 AGE-RELATED OSTEOPOROSIS WITHOUT CURRENT PATHOLOGICAL FRACTURE: Chronic | ICD-10-CM

## 2024-11-18 DIAGNOSIS — Z23 INFLUENZA VACCINE NEEDED: ICD-10-CM

## 2024-11-18 DIAGNOSIS — N81.9 FEMALE GENITAL PROLAPSE, UNSPECIFIED TYPE: Primary | Chronic | ICD-10-CM

## 2024-11-18 DIAGNOSIS — Z46.89 ENCOUNTER FOR FITTING AND ADJUSTMENT OF PESSARY: ICD-10-CM

## 2024-11-18 DIAGNOSIS — I10 PRIMARY HYPERTENSION: Chronic | ICD-10-CM

## 2024-11-18 DIAGNOSIS — R73.03 PREDIABETES: Chronic | ICD-10-CM

## 2024-11-18 PROCEDURE — 99214 OFFICE O/P EST MOD 30 MIN: CPT | Mod: PBBFAC

## 2024-11-18 PROCEDURE — 90656 IIV3 VACC NO PRSV 0.5 ML IM: CPT | Mod: PBBFAC

## 2024-11-18 PROCEDURE — G0008 ADMIN INFLUENZA VIRUS VAC: HCPCS | Mod: PBBFAC

## 2024-11-18 PROCEDURE — 99214 OFFICE O/P EST MOD 30 MIN: CPT | Mod: S$PBB,,,

## 2024-11-18 PROCEDURE — 99214 OFFICE O/P EST MOD 30 MIN: CPT | Mod: PBBFAC,27

## 2024-11-18 RX ORDER — OMEPRAZOLE 20 MG/1
20 CAPSULE, DELAYED RELEASE ORAL DAILY
Qty: 90 CAPSULE | Refills: 2 | Status: SHIPPED | OUTPATIENT
Start: 2024-11-18

## 2024-11-18 RX ORDER — LOSARTAN POTASSIUM 100 MG/1
100 TABLET ORAL DAILY
Qty: 90 TABLET | Refills: 2 | Status: SHIPPED | OUTPATIENT
Start: 2024-11-18

## 2024-11-18 RX ORDER — SIMVASTATIN 10 MG/1
10 TABLET, FILM COATED ORAL NIGHTLY
Qty: 90 TABLET | Refills: 0 | Status: SHIPPED | OUTPATIENT
Start: 2024-11-18

## 2024-11-18 RX ORDER — ALENDRONATE SODIUM 70 MG/1
70 TABLET ORAL
Qty: 12 TABLET | Refills: 2 | Status: SHIPPED | OUTPATIENT
Start: 2024-11-18

## 2024-11-18 RX ORDER — HYDROCHLOROTHIAZIDE 12.5 MG/1
12.5 TABLET ORAL DAILY
Qty: 90 TABLET | Refills: 2 | Status: SHIPPED | OUTPATIENT
Start: 2024-11-18

## 2024-11-18 RX ORDER — AMLODIPINE BESYLATE 5 MG/1
5 TABLET ORAL DAILY
Qty: 90 TABLET | Refills: 2 | Status: SHIPPED | OUTPATIENT
Start: 2024-11-18

## 2024-11-18 RX ADMIN — INFLUENZA VIRUS VACCINE 0.5 ML: 15; 15; 15 SUSPENSION INTRAMUSCULAR at 08:11

## 2024-11-18 NOTE — ASSESSMENT & PLAN NOTE
Omeprazole refilled  Avoid spicy, acidic, fried foods and alcohol.  Eat 2-3 hours before going to bed.  Avoid tight clothing, chew food thoroughly.  Reduce caffeine intake, avoid soda.  Stressed importance of Smoking/Tobacco Cessation.

## 2024-11-18 NOTE — PROGRESS NOTES
"  [facility] GYNECOLOGY CLINIC NOTE     Kika Farias is a 78 y.o.  presenting to GYN clinic for pessary check. She has h/o Stage III Ba prolapse managed with a #2 3/4 Gellhorn pessary. She was last seen 3/2024 with no issues. History obtained via .     She is doing well. She has been using the estrogen cream twice weekly. She denies any vaginal discharge, malodor, pain or bleeding. She is not sexually active. Denies any issues with voiding of urine or stool She comes in q6 months for pessary check, cleaning and replacement. States she has had this pessary > 2 years.     Past Medical History:   Diagnosis Date    GERD (gastroesophageal reflux disease)     Hyperlipidemia     Hypertension     Osteoporosis     Unspecified osteoarthritis, unspecified site       Past Surgical History:   Procedure Laterality Date    TUBAL LIGATION        Current Outpatient Medications   Medication Instructions    alendronate (FOSAMAX) 70 mg, Oral, Every 7 days    amLODIPine (NORVASC) 5 mg, Oral, Daily    calcium carbonate (OS-GERALDINE) 600 mg, Once    collagen/biotin/ascorbic acid (COLLAGEN 1500 PLUS C ORAL) Take by mouth.    hydroCHLOROthiazide (HYDRODIURIL) 12.5 mg, Oral, Daily    ibuprofen (ADVIL,MOTRIN) 800 mg, Every 8 hours    losartan (COZAAR) 100 mg, Oral, Daily    omeprazole (PRILOSEC) 20 mg, Oral, Daily    PREMARIN 1 g, Vaginal, Daily    simvastatin (ZOCOR) 10 mg, Oral, Nightly     Social History     Tobacco Use    Smoking status: Never     Passive exposure: Never    Smokeless tobacco: Never   Substance Use Topics    Alcohol use: Never    Drug use: Never       Review of Systems  Pertinent items are noted in HPI.     Objective:     BP (!) 171/75 (BP Location: Right arm, Patient Position: Sitting)   Pulse 61   Temp 97.6 °F (36.4 °C) (Oral)   Resp 18   Ht 5' 1" (1.549 m)   Wt 50 kg (110 lb 3.2 oz)   SpO2 100%   BMI 20.82 kg/m²   Physical Exam:  Gen: Well-nourished, well-developed female appearing stated age. " "Alert, cooperative, in no acute distress.  CV: rrr  Chest: no conversational dyspnea  Abdomen: Soft, non-tender, no masses.  Extrem: Extremities normal, atraumatic, non-tender calves.  External genitalia: Normal female genetalia without lesion, discharge or tenderness.  Speculum Exam: Vaginal vault without discharge, nonodorous, no lesions/masses seen. Atrophic vagina. Cervical os visualized as closed, no lesions/masses. Small 1cm polyp noted at the cervix stable. Not friable. No interval growth from last exam.  Bimanual Exam: No cervical motion tenderness.  Uterus freely mobile.  Small 6 week size uterus. No adnexal masses.  Nontender exam.     Long-stem 2 3/4" gellhorn removed at today's visit and exchanged for short-stem 2 3/4" Gellhorn. Pt reports comfort with new pessary, able to walk and void without difficulty.    Note: RN chaperone present for entirety of exam.    Assessment/Plan:   Kika Farias is a 78 y.o.  with Stage III Ba prolapse managed with a #2 3/4 Gellhorn pessary w/ long stem, exchanged today for the short stem 2 3/4" gellhorn.     1. Female genital prolapse, unspecified type        2. Encounter for fitting and adjustment of pessary          Problem List Items Addressed This Visit       Female genital prolapse - Primary (Chronic)     -Due to wear on prior 2 3/4" gellhorn with long-stem, pessary exchanged for 2 3/4" gellhorn with short stem  - pt encouraged to continue estrogen cream  - will have pt return to office in 2 weeks to reassess fit           Other Visit Diagnoses       Encounter for fitting and adjustment of pessary              Return to clinic in 2 weeks for pessary check    Discussed patient and plan with Dr. Chayito Guadarrama MD   [employer] [facility] PGY-4      "

## 2024-11-18 NOTE — ASSESSMENT & PLAN NOTE
Continue RX Fosamax weekly  Continue OTC Vitamin D & Calcium  Take alone on an empty stomach first thing in the morning with at least 240 mL (8 oz) of water. After administration,no food, drink, medications, or supplements for at least one half-hour.  The reason for taking 8 oz of water is to minimize the risk of the tablet getting stuck in the esophagus. The reason for taking the medication while fasting and waiting one half-hour until eating or drinking is that bioavailability may be seriously impaired by ingestion with liquids other than plain water, such as mineral water, coffee, or juice; by retained gastric contents, as with insufficient fasting time or gastroparesis; or by eating or drinking too soon afterwards.  Patients should remain upright (sitting or standing) for at least 30 minutes after administration to minimize the risk of reflux.  Contact clinic with any signs of unresolved GI symptoms or Jaw pain.

## 2024-11-18 NOTE — ASSESSMENT & PLAN NOTE
At goal.  Continue HCTZ, amlodipine, losartan  Low Sodium Diet (DASH Diet - Less than 2 grams of sodium per day).  Monitor blood pressure daily and log. Report consistent numbers greater than 140/90.  Maintain healthy weight with goal BMI <30. Exercise 30 minutes per day, 5 days per week.  Smoking cessation encouraged to aid in BP reduction.

## 2024-11-18 NOTE — ASSESSMENT & PLAN NOTE
A1C ordered  Follow ADA Diet. Avoid soda, simple sweets, and limit rice/pasta/breads/starches (no more than 45-50 grams per meal).  Maintain healthy weight with goal BMI <30.  Exercise 5 times per week for 30 minutes per day.

## 2024-11-18 NOTE — ASSESSMENT & PLAN NOTE
Flp ordered  Continue simvastatin 10mg  Stressed importance of dietary modifications. Follow a low cholesterol, low saturated fat diet with less that 200mg of cholesterol a day.  Avoid fried foods and high saturated fats (high saturated fats less than 7% of calories).  Add Flax Seed/Fish Oil supplements to diet. Increase dietary fiber.  Regular exercise can reduce LDL and raise HDL. Stressed importance of physical activity 5 times per week for 30 minutes per day.

## 2024-11-19 NOTE — ASSESSMENT & PLAN NOTE
"-Due to wear on prior 2 3/4" gellhorn with long-stem, pessary exchanged for 2 3/4" gellhorn with short stem  - pt encouraged to continue estrogen cream  - will have pt return to office in 2 weeks to reassess fit   "

## 2024-11-25 ENCOUNTER — OFFICE VISIT (OUTPATIENT)
Dept: GYNECOLOGY | Facility: CLINIC | Age: 79
End: 2024-11-25
Payer: MEDICARE

## 2024-11-25 VITALS
TEMPERATURE: 98 F | WEIGHT: 111 LBS | RESPIRATION RATE: 18 BRPM | HEIGHT: 61 IN | OXYGEN SATURATION: 100 % | BODY MASS INDEX: 20.96 KG/M2 | HEART RATE: 72 BPM | DIASTOLIC BLOOD PRESSURE: 67 MMHG | SYSTOLIC BLOOD PRESSURE: 135 MMHG

## 2024-11-25 DIAGNOSIS — N81.9 FEMALE GENITAL PROLAPSE, UNSPECIFIED TYPE: Primary | Chronic | ICD-10-CM

## 2024-11-25 PROCEDURE — 99213 OFFICE O/P EST LOW 20 MIN: CPT | Mod: PBBFAC

## 2024-11-25 NOTE — PROGRESS NOTES
"  [facility] GYNECOLOGY CLINIC NOTE     Kika Farias is a 78 y.o.  presenting to GYN clinic for pessary check. She has h/o Stage III Ba prolapse initially managed with a #2 3/4 long-stem Gellhorn pessary exchanged for a #2 3/4 inch short-stem gellhorn pessary at her last visit 2 weeks ago. History obtained via  #[id number].      She is doing well. Denies any issues with her current pessary. Able to void bowel and bladder without difficulty. No issues with unusual vaginal discharge or bleeding. Stopped using premarin cream as she was no longer having any issues with vaginal dryness. No other complaints at this time.      Past Medical History:   Diagnosis Date    GERD (gastroesophageal reflux disease)     Hyperlipidemia     Hypertension     Osteoporosis     Unspecified osteoarthritis, unspecified site       Past Surgical History:   Procedure Laterality Date    TUBAL LIGATION        Current Outpatient Medications   Medication Instructions    alendronate (FOSAMAX) 70 mg, Oral, Every 7 days    amLODIPine (NORVASC) 5 mg, Oral, Daily    calcium carbonate (OS-GERALDINE) 600 mg, Once    collagen/biotin/ascorbic acid (COLLAGEN 1500 PLUS C ORAL) Take by mouth.    hydroCHLOROthiazide (HYDRODIURIL) 12.5 mg, Oral, Daily    ibuprofen (ADVIL,MOTRIN) 800 mg, Every 8 hours    losartan (COZAAR) 100 mg, Oral, Daily    omeprazole (PRILOSEC) 20 mg, Oral, Daily    PREMARIN 1 g, Vaginal, Daily    simvastatin (ZOCOR) 10 mg, Oral, Nightly     Social History     Tobacco Use    Smoking status: Never     Passive exposure: Never    Smokeless tobacco: Never   Substance Use Topics    Alcohol use: Never    Drug use: Never       Review of Systems  Pertinent items are noted in HPI.     Objective:     /67 (BP Location: Right arm, Patient Position: Sitting)   Pulse 72   Temp 97.7 °F (36.5 °C) (Oral)   Resp 18   Ht 5' 1" (1.549 m)   Wt 50.3 kg (111 lb)   SpO2 100%   BMI 20.97 kg/m²   Physical Exam:  Gen: Well-nourished, " "well-developed female appearing stated age. Alert, cooperative, in no acute distress.  CV: rrr  Chest: no conversational dyspnea  Abdomen: Soft, non-tender, no masses.  Extrem: Extremities normal, atraumatic, non-tender calves.  External genitalia: 2 3/4" short stem Gellhorn in place well-seated within vaginal vault. Non-tender vaginal exam.     Of note, pt deferring pessary removal and cleaning today. Unable to assess vaginal mucosa 2/2 pessary in place as pt declining removal.     Note: RN chaperone present for entirety of exam.    Assessment/Plan:   Kika Farias is a 78 y.o.  with Stage III Ba prolapse managed with a #2 3/4 Gellhorn pessary w/ short stem, doing well today at pessary check.     1. Female genital prolapse, unspecified type          Problem List Items Addressed This Visit       Female genital prolapse - Primary (Chronic)     - 2 3/4" gellhorn with short stem in place without issue. Pt declined removal of pessary today for cleaning, okay with having this done at next visit.   - pt encouraged to continue estrogen cream  - will have pt return to office in 2 months for pessary check           Return to clinic in 2 months for pessary check     Discussed patient and plan with Dr. Chayito Guadarrama MD   [employer] [facility] PGY-4        "

## 2024-11-25 NOTE — ASSESSMENT & PLAN NOTE
"- 2 3/4" gellhorn with short stem in place without issue. Pt declined removal of pessary today for cleaning, okay with having this done at next visit.   - pt encouraged to continue estrogen cream  - will have pt return to office in 2 months for pessary check   "

## 2025-02-10 ENCOUNTER — OFFICE VISIT (OUTPATIENT)
Dept: GYNECOLOGY | Facility: CLINIC | Age: 80
End: 2025-02-10
Payer: MEDICARE

## 2025-02-10 VITALS
HEART RATE: 66 BPM | RESPIRATION RATE: 18 BRPM | OXYGEN SATURATION: 99 % | BODY MASS INDEX: 21.97 KG/M2 | HEIGHT: 61 IN | TEMPERATURE: 98 F | SYSTOLIC BLOOD PRESSURE: 137 MMHG | WEIGHT: 116.38 LBS | DIASTOLIC BLOOD PRESSURE: 69 MMHG

## 2025-02-10 DIAGNOSIS — Z46.89 PESSARY MAINTENANCE: ICD-10-CM

## 2025-02-10 DIAGNOSIS — N81.9 FEMALE GENITAL PROLAPSE, UNSPECIFIED TYPE: ICD-10-CM

## 2025-02-10 PROCEDURE — 99213 OFFICE O/P EST LOW 20 MIN: CPT | Mod: PBBFAC

## 2025-02-10 RX ORDER — CONJUGATED ESTROGENS 0.62 MG/G
1 CREAM VAGINAL DAILY
Qty: 30 G | Refills: 6 | Status: SHIPPED | OUTPATIENT
Start: 2025-02-10

## 2025-02-10 NOTE — PROGRESS NOTES
"  [facility] GYNECOLOGY CLINIC NOTE     Kika Farias is a 79 y.o.  presenting to GYN clinic for pessary check. She has h/o Stage III Ba prolapse initially managed with a #2 3/4 long-stem Gellhorn pessary exchanged for a #2 3/4 inch short-stem gellhorn pessary in 2024.       She is doing well. Denies any issues with her current pessary. Able to void bowel and bladder without difficulty. No issues with unusual vaginal discharge or bleeding. Stopped using premarin cream 2 weeks ago. Reports she has run out of cream and is requesting a refill. No other complaints at this time.      Past Medical History:   Diagnosis Date    GERD (gastroesophageal reflux disease)     Hyperlipidemia     Hypertension     Osteoporosis     Unspecified osteoarthritis, unspecified site       Past Surgical History:   Procedure Laterality Date    TUBAL LIGATION        Current Outpatient Medications   Medication Instructions    alendronate (FOSAMAX) 70 mg, Oral, Every 7 days    amLODIPine (NORVASC) 5 mg, Oral, Daily    calcium carbonate (OS-GERALDINE) 600 mg, Once    collagen/biotin/ascorbic acid (COLLAGEN 1500 PLUS C ORAL) Take by mouth.    hydroCHLOROthiazide 12.5 mg, Oral, Daily    ibuprofen (ADVIL,MOTRIN) 800 mg, Every 8 hours    losartan (COZAAR) 100 mg, Oral, Daily    omeprazole (PRILOSEC) 20 mg, Oral, Daily    PREMARIN 1 g, Vaginal, Daily    simvastatin (ZOCOR) 10 mg, Oral, Nightly     Social History     Tobacco Use    Smoking status: Never     Passive exposure: Never    Smokeless tobacco: Never   Substance Use Topics    Alcohol use: Never    Drug use: Never       Review of Systems  Pertinent items are noted in HPI.     Objective:     /69 (BP Location: Right arm, Patient Position: Sitting)   Pulse 66   Temp 97.9 °F (36.6 °C) (Oral)   Resp 18   Ht 5' 1" (1.549 m)   Wt 52.8 kg (116 lb 6.4 oz)   SpO2 99%   BMI 21.99 kg/m²   Physical Exam:  Gen: Well-nourished, well-developed female appearing stated age. Alert, cooperative, in no " "acute distress.  CV: rrr  Chest: no conversational dyspnea  Abdomen: Soft, non-tender, no masses.  Extrem: Extremities normal, atraumatic, non-tender calves.  Pelvic exam: 2 3/4" short stem Gellhorn in place well-seated within vaginal vault. Removed with assistance of Ring forceps. Vaginal vault with scant white discharge, micro abrasion along right vaginal sidewall 2/2 trauma from pessary removal. Cervix well visualized with small, approximately 1cm polyp at 5 o'clock on external cervix, not friable, no bleeding, stable from prior exams. Pessary cleaned and replaced.     Note: RN chaperone present for entirety of exam.    Assessment/Plan:   Kika Farias is a 79 y.o.  with Stage III Ba prolapse managed with a #2 3/4 Gellhorn pessary w/ short stem, doing well today at pessary check.     Pessary check  Pessary removed, cleaned, and replaced  No concern for infection or erosion on pelvic exam  Premarin cream refilled   RTC in 6 months for pessary check       1. Female genital prolapse, unspecified type  PREMARIN vaginal cream      2. Pessary maintenance  PREMARIN vaginal cream          Problem List Items Addressed This Visit          Renal/    Female genital prolapse (Chronic)    Relevant Medications    PREMARIN vaginal cream     Other Visit Diagnoses       Pessary maintenance        Relevant Medications    PREMARIN vaginal cream            Discussed patient and plan with Dr. Provost Ce Dowd MD  [employer] [facility] - PGY-3  8:23 AM 02/10/2025     "

## 2025-02-11 NOTE — PROGRESS NOTES
[facility]    RTC in 2 months for procedure day YAG CAP OS    Increased tearing OU  Last edited by Alejandra Disla RN on 2/12/2025  7:57 AM.            Assessment /Plan     For exam results, see Encounter Report.    Posterior capsular opacification, unspecified laterality  -     tropicamide /PHENYLephrine opthalmic solution 1 drop        OCT mac  - 11/30/2023  OD: , intact foveal contour, no IRF/SRF  OS: , intact foveal contour, disruption of the OS junction subfoveally   - 07/11/2023:  OD: , intact foveal contour, no IRF/SRF  OS: , intact foveal contour, disruption of the OS junction subfoveally   - 9/30/2022:  OD:218 Good foveal contour with few drusen is os intact  OS: 202 Good foveal contour with few drusen; is os disruption at fovea  - 10/2021:  OD:213 Good foveal contour with few drusen is os intact  OS: 206 Good foveal contour with few drusen; is os disruption at fovea    MRX:  - 3/2021  OD: -0.5 +2.50 x 6  OS: -2.25 +3.25 x175  Add: +2.50    Patricio 3/30/21  OD: Flat SimK 40.99, steep simK 41.89, astig 1.8D at 6  OS; Flat SimK 40.22, Steep simK 42.44, astig 2.23D at 165      Assessment/Plan:     1) Pseudophakic, OU  - most recently s/p CE/IOL OS (9/24/2020)  - Likely residual astigmatism ( Patient decided to get basic lens instead of toric lens offered )  - difficult MRx previously, so patricio obtained 3/2021. With residual astigmatism OU  - Patient with new MRx 11/30/23, 20/25 // 20/30.     2. 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 Patient wishes for treatment after discussion of r/b/a. Will bring back for next available procedure day for YAG Cap OS.  - 7/5/24: Pt rescheduled YAG OS 6/26/24 to 7/24/24. Pt's vision is stable but still blurry OS  - 9/26/24: Patient never had YAG, missed appointments 2x, will bring back on procedure day for YAG, patient wants to start with OS then OD. Tentative plan for 2 months procedure day, patient says she has had trouble  scheduling around work and will let us know.  - 2/12/25: Plan for YAG OS, r/b/a explained. Patient wishes to proceed. Consent signed and witnessed.    3. KIKI 2/2 blephritis OU and lower eyleid laxity OU  - artificial tears 4-6 x/daily, WC, LS  - patient using TID, told to increase to 6x/day PFATs  - nighttime denise, discussed restarting gel or ointment  - 2/12/2025: Patient endorsing increased tearing, discussed restarting eye drops more frequently    4. ARMD OU, mild  - Amsler grid daily  - No smoking, good diet and exercise encouraged  - OCT mac stable with no IRF/SRF  - annual DFE    5. Blepharoptosis OU  - not interested in surgery at this time, consider ptosis field in future    RTC in 1 week for Mrx and AC check (already scheduled)           Procedure Note:     Procedure Note: YAG Capsulotomy, Left Eye  Date: 02/12/2025  Pre-operative Diagnosis: Visually significant posterior capsule opacification, Left eye  Post-operative Diagnosis: Same as above  Surgeon: Vijay Burciaga MD  Staff: Ro Alfredo MD  Anesthesia: 0.5% proparacaine drops  Antisepsis: None  Blood loss: None  Specimens: None  Implants: None  Complications: None    Procedure in Detail:   The indication, risks, benefits, and alternatives of the procedure were discussed with the patient in full, including but not limited to inadvertent damage to surrounding eye structures, loss of vision, loss of eye, persistent inflammation, rise in intraocular pressure, retinal detachment, and/or visual obscurations. The patient voiced understanding and written informed consent was obtained, witnessed, and placed in the patient's chart. The above indicated pupil was dilated using phenylephrine and tropicamide. Apraclopidine instilled 15-30 minutes prior to procedure    The patient was positioned comfortably at the laser. A timeout was performed to confirm the correct patient, procedure, and laterality. A drop of proparacaine was given in the eye. Gonak was used and the  Jamari YAP capsulotomy lens was placed on the eye. Treatment was performed with the following settings in an horseshoe pattern until the capsule was noted to fall posteriorly:    Energy per shot:  2.2 mJ  Total # of shots: 68 shots  Total energy:  163 mJ    The patient tolerated the procedure well without complications. IOP was checked 15-20 minutes after the procedure measuring 17 mm Hg via TonoPen.     The patient was instructed to start prednisolone forte 1 drop four times a day in the operative eye for one week. The patient will follow up in 1 week with IOP check, MRx, DFE -  sooner as needed.  Return precautions including retinal detachment precautions, pain, or change in vision were discussed with the patient.

## 2025-02-12 ENCOUNTER — CLINICAL SUPPORT (OUTPATIENT)
Dept: OPHTHALMOLOGY | Facility: CLINIC | Age: 80
End: 2025-02-12
Payer: MEDICARE

## 2025-02-12 VITALS — BODY MASS INDEX: 22.07 KG/M2 | HEIGHT: 61 IN | WEIGHT: 116.88 LBS

## 2025-02-12 DIAGNOSIS — H26.492 LEFT POSTERIOR CAPSULAR OPACIFICATION: Primary | ICD-10-CM

## 2025-02-12 PROCEDURE — 99213 OFFICE O/P EST LOW 20 MIN: CPT | Mod: PBBFAC,PN

## 2025-02-12 RX ORDER — PHENYLEPH/TROPICAMIDE IN WATER 2.5 %-1 %
1 DROPS OPHTHALMIC (EYE) ONCE
Status: COMPLETED | OUTPATIENT
Start: 2025-02-12 | End: 2025-02-12

## 2025-02-12 RX ORDER — PREDNISOLONE ACETATE 10 MG/ML
1 SUSPENSION/ DROPS OPHTHALMIC 4 TIMES DAILY
Qty: 5 ML | Refills: 0 | Status: SHIPPED | OUTPATIENT
Start: 2025-02-12 | End: 2026-02-12

## 2025-02-12 RX ADMIN — Medication 1 DROP: at 08:02

## 2025-02-13 ENCOUNTER — TELEPHONE (OUTPATIENT)
Dept: OPHTHALMOLOGY | Facility: CLINIC | Age: 80
End: 2025-02-13
Payer: MEDICARE

## 2025-02-18 ENCOUNTER — HOSPITAL ENCOUNTER (EMERGENCY)
Facility: HOSPITAL | Age: 80
Discharge: HOME | End: 2025-02-18
Attending: EMERGENCY MEDICINE
Payer: MEDICARE

## 2025-02-18 VITALS
OXYGEN SATURATION: 99 % | WEIGHT: 115.5 LBS | BODY MASS INDEX: 18.56 KG/M2 | HEART RATE: 73 BPM | TEMPERATURE: 98 F | DIASTOLIC BLOOD PRESSURE: 66 MMHG | SYSTOLIC BLOOD PRESSURE: 175 MMHG | HEIGHT: 66 IN | RESPIRATION RATE: 21 BRPM

## 2025-02-18 DIAGNOSIS — R42 DIZZINESS: ICD-10-CM

## 2025-02-18 LAB
ALBUMIN SERPL-MCNC: 3.9 G/DL (ref 3.4–4.8)
ALBUMIN/GLOB SERPL: 1.2 RATIO (ref 1.1–2)
ALP SERPL-CCNC: 63 UNIT/L (ref 40–150)
ALT SERPL-CCNC: 8 UNIT/L (ref 0–55)
ANION GAP SERPL CALC-SCNC: 7 MEQ/L
AST SERPL-CCNC: 17 UNIT/L (ref 5–34)
BACTERIA #/AREA URNS AUTO: ABNORMAL /HPF
BASOPHILS # BLD AUTO: 0.06 X10(3)/MCL
BASOPHILS NFR BLD AUTO: 1.1 %
BILIRUB SERPL-MCNC: 0.8 MG/DL
BILIRUB UR QL STRIP.AUTO: NEGATIVE
BNP BLD-MCNC: 42.7 PG/ML
BUN SERPL-MCNC: 15 MG/DL (ref 9.8–20.1)
CALCIUM SERPL-MCNC: 9.3 MG/DL (ref 8.4–10.2)
CHLORIDE SERPL-SCNC: 98 MMOL/L (ref 98–107)
CLARITY UR: CLEAR
CO2 SERPL-SCNC: 27 MMOL/L (ref 23–31)
COLOR UR AUTO: ABNORMAL
CREAT SERPL-MCNC: 0.71 MG/DL (ref 0.55–1.02)
CREAT/UREA NIT SERPL: 21
EOSINOPHIL # BLD AUTO: 0.17 X10(3)/MCL (ref 0–0.9)
EOSINOPHIL NFR BLD AUTO: 3.1 %
ERYTHROCYTE [DISTWIDTH] IN BLOOD BY AUTOMATED COUNT: 12 % (ref 11.5–17)
GFR SERPLBLD CREATININE-BSD FMLA CKD-EPI: >60 ML/MIN/1.73/M2
GLOBULIN SER-MCNC: 3.2 GM/DL (ref 2.4–3.5)
GLUCOSE SERPL-MCNC: 104 MG/DL (ref 82–115)
GLUCOSE UR QL STRIP: NORMAL
HCT VFR BLD AUTO: 39.5 % (ref 37–47)
HGB BLD-MCNC: 13.4 G/DL (ref 12–16)
HGB UR QL STRIP: NEGATIVE
HOLD SPECIMEN: NORMAL
HYALINE CASTS #/AREA URNS LPF: ABNORMAL /LPF
IMM GRANULOCYTES # BLD AUTO: 0.02 X10(3)/MCL (ref 0–0.04)
IMM GRANULOCYTES NFR BLD AUTO: 0.4 %
KETONES UR QL STRIP: ABNORMAL
LEUKOCYTE ESTERASE UR QL STRIP: NEGATIVE
LYMPHOCYTES # BLD AUTO: 1.57 X10(3)/MCL (ref 0.6–4.6)
LYMPHOCYTES NFR BLD AUTO: 28.5 %
MCH RBC QN AUTO: 30.8 PG (ref 27–31)
MCHC RBC AUTO-ENTMCNC: 33.9 G/DL (ref 33–36)
MCV RBC AUTO: 90.8 FL (ref 80–94)
MONOCYTES # BLD AUTO: 0.48 X10(3)/MCL (ref 0.1–1.3)
MONOCYTES NFR BLD AUTO: 8.7 %
MUCOUS THREADS URNS QL MICRO: ABNORMAL /LPF
NEUTROPHILS # BLD AUTO: 3.21 X10(3)/MCL (ref 2.1–9.2)
NEUTROPHILS NFR BLD AUTO: 58.2 %
NITRITE UR QL STRIP: NEGATIVE
NRBC BLD AUTO-RTO: 0 %
OHS QRS DURATION: 72 MS
OHS QTC CALCULATION: 425 MS
PH UR STRIP: 8.5 [PH]
PLATELET # BLD AUTO: 320 X10(3)/MCL (ref 130–400)
PMV BLD AUTO: 9.3 FL (ref 7.4–10.4)
POTASSIUM SERPL-SCNC: 3.8 MMOL/L (ref 3.5–5.1)
PROT SERPL-MCNC: 7.1 GM/DL (ref 5.8–7.6)
PROT UR QL STRIP: NEGATIVE
RBC # BLD AUTO: 4.35 X10(6)/MCL (ref 4.2–5.4)
RBC #/AREA URNS AUTO: ABNORMAL /HPF
SODIUM SERPL-SCNC: 132 MMOL/L (ref 136–145)
SP GR UR STRIP.AUTO: 1.01 (ref 1–1.03)
SQUAMOUS #/AREA URNS LPF: ABNORMAL /HPF
TROPONIN I SERPL-MCNC: <0.01 NG/ML (ref 0–0.04)
UROBILINOGEN UR STRIP-ACNC: NORMAL
WBC # BLD AUTO: 5.51 X10(3)/MCL (ref 4.5–11.5)
WBC #/AREA URNS AUTO: ABNORMAL /HPF

## 2025-02-18 PROCEDURE — 83880 ASSAY OF NATRIURETIC PEPTIDE: CPT | Performed by: NURSE PRACTITIONER

## 2025-02-18 PROCEDURE — 81001 URINALYSIS AUTO W/SCOPE: CPT | Performed by: NURSE PRACTITIONER

## 2025-02-18 PROCEDURE — 96360 HYDRATION IV INFUSION INIT: CPT

## 2025-02-18 PROCEDURE — 25000003 PHARM REV CODE 250: Performed by: NURSE PRACTITIONER

## 2025-02-18 PROCEDURE — 99285 EMERGENCY DEPT VISIT HI MDM: CPT | Mod: 25

## 2025-02-18 PROCEDURE — 93005 ELECTROCARDIOGRAM TRACING: CPT

## 2025-02-18 PROCEDURE — 80053 COMPREHEN METABOLIC PANEL: CPT | Performed by: NURSE PRACTITIONER

## 2025-02-18 PROCEDURE — 84484 ASSAY OF TROPONIN QUANT: CPT | Performed by: NURSE PRACTITIONER

## 2025-02-18 PROCEDURE — 85025 COMPLETE CBC W/AUTO DIFF WBC: CPT | Performed by: NURSE PRACTITIONER

## 2025-02-18 RX ADMIN — SODIUM CHLORIDE 500 ML: 9 INJECTION, SOLUTION INTRAVENOUS at 09:02

## 2025-02-18 NOTE — ED PROVIDER NOTES
Encounter Date: 2/18/2025       History     Chief Complaint   Patient presents with    Dizziness     Pt reports she woke up with dizziness upon standing at 8 am this morning and the middle of her lower lip was trembling. No other symptoms reported at this time. Pt is VAN negative and provide is at bedside assessing the patient.      The patient presents with dizziness. The onset was this morning upon getting out of bed.  The symptoms are episodic. The exacerbating factor is going from sitting to standing. The relieving factor is lying or sitting down. PMHx of  HTN, HLD, prediabetes, macular degeneration, osteoporosis, left knee OA, uterine prolapse and ureteral carbuncle. Pt followed by [facility] medicine, GYN, audiology and optho clinics.   Prior episodes: occasional. Therapy today: none. Preceding symptoms: none. Associated symptoms: she reports mild numbness to small area middle lower lip.  Associated symptoms: none, denies shortness of breath, denies chest pain, denies abdominal pain, denies nausea, denies vomiting, denies dysuria, denies loss of consciousness, denies altered level of consciousness, denies fever, and denies chills. Video  used for encounter. She is here with a friend.        Review of patient's allergies indicates:  No Known Allergies  Past Medical History:   Diagnosis Date    GERD (gastroesophageal reflux disease)     Hyperlipidemia     Hypertension     Osteoporosis     Unspecified osteoarthritis, unspecified site      Past Surgical History:   Procedure Laterality Date    TUBAL LIGATION       No family history on file.  Social History[1]  Review of Systems   Constitutional:  Negative for fever.   HENT:  Negative for sore throat.    Respiratory:  Negative for shortness of breath.    Cardiovascular:  Negative for chest pain.   Gastrointestinal:  Negative for nausea.   Genitourinary:  Negative for dysuria.   Musculoskeletal:  Negative for back pain.   Skin:  Negative for rash.   Neurological:   Positive for dizziness. Negative for weakness.   Hematological:  Does not bruise/bleed easily.   All other systems reviewed and are negative.      Physical Exam     Initial Vitals [02/18/25 0910]   BP Pulse Resp Temp SpO2   (!) 174/82 74 18 98.2 °F (36.8 °C) 99 %      MAP       --         Physical Exam    Nursing note and vitals reviewed.  Constitutional: She appears well-developed and well-nourished.   HENT:   Head: Normocephalic and atraumatic.   Right Ear: Tympanic membrane normal.   Left Ear: Tympanic membrane normal.   Nose: Nose normal. Mouth/Throat: Uvula is midline, oropharynx is clear and moist and mucous membranes are normal.   Neck: Neck supple.   Normal range of motion.  Cardiovascular:  Normal rate, regular rhythm, normal heart sounds and intact distal pulses.           Pulmonary/Chest: Effort normal and breath sounds normal. She has no decreased breath sounds.   Abdominal: Abdomen is soft and flat. Bowel sounds are normal. There is no abdominal tenderness.   Musculoskeletal:         General: Normal range of motion.      Cervical back: Normal range of motion and neck supple.     Neurological: She is alert. She has normal strength. No cranial nerve deficit.   Skin: Skin is warm and dry.   Psychiatric: She has a normal mood and affect.         ED Course   Procedures  Labs Reviewed   COMPREHENSIVE METABOLIC PANEL - Abnormal       Result Value    Sodium 132 (*)     Potassium 3.8      Chloride 98      CO2 27      Glucose 104      Blood Urea Nitrogen 15.0      Creatinine 0.71      Calcium 9.3      Protein Total 7.1      Albumin 3.9      Globulin 3.2      Albumin/Globulin Ratio 1.2      Bilirubin Total 0.8      ALP 63      ALT 8      AST 17      eGFR >60      Anion Gap 7.0      BUN/Creatinine Ratio 21     URINALYSIS, REFLEX TO URINE CULTURE - Abnormal    Color, UA Light-Yellow      Appearance, UA Clear      Specific Gravity, UA 1.013      pH, UA 8.5      Protein, UA Negative      Glucose, UA Normal       Ketones, UA Trace (*)     Blood, UA Negative      Bilirubin, UA Negative      Urobilinogen, UA Normal      Nitrites, UA Negative      Leukocyte Esterase, UA Negative      RBC, UA 0-5      WBC, UA 0-5      Bacteria, UA Trace (*)     Squamous Epithelial Cells, UA Occasional (*)     Mucous, UA Trace (*)     Hyaline Casts, UA None Seen     TROPONIN I - Normal    Troponin-I <0.010     B-TYPE NATRIURETIC PEPTIDE - Normal    Natriuretic Peptide 42.7     CBC W/ AUTO DIFFERENTIAL    Narrative:     The following orders were created for panel order CBC auto differential.  Procedure                               Abnormality         Status                     ---------                               -----------         ------                     CBC with Differential[[id number]]                           Final result                 Please view results for these tests on the individual orders.   CBC WITH DIFFERENTIAL    WBC 5.51      RBC 4.35      Hgb 13.4      Hct 39.5      MCV 90.8      MCH 30.8      MCHC 33.9      RDW 12.0      Platelet 320      MPV 9.3      Neut % 58.2      Lymph % 28.5      Mono % 8.7      Eos % 3.1      Basophil % 1.1      Imm Grans % 0.4      Neut # 3.21      Lymph # 1.57      Mono # 0.48      Eos # 0.17      Baso # 0.06      Imm Gran # 0.02      NRBC% 0.0     EXTRA TUBES    Narrative:     The following orders were created for panel order EXTRA TUBES.  Procedure                               Abnormality         Status                     ---------                               -----------         ------                     Light Blue Top Hold[[id number]]                             Final result               Red Top Hold[[id number]]                                    Final result               Light Green Top Hold[[id number]]                            Final result               Lavender Top Hold[[id number]]                               Final result               Gold Top Hold[[id number]]                                    Final result               Pink Top Hold[[id number]]                                   Final result                 Please view results for these tests on the individual orders.   LIGHT BLUE TOP HOLD    Extra Tube Hold for add-ons.     RED TOP HOLD    Extra Tube Hold for add-ons.     LIGHT GREEN TOP HOLD    Extra Tube Hold for add-ons.     LAVENDER TOP HOLD    Extra Tube Hold for add-ons.     GOLD TOP HOLD    Extra Tube Hold for add-ons.     PINK TOP HOLD    Extra Tube Hold for add-ons.       EKG Readings: (Independently Interpreted)   Initial Reading: No STEMI. Rhythm: Normal Sinus Rhythm. Heart Rate: 70. Ectopy: No Ectopy. Conduction: Normal. Axis: Normal.   Reviewed by Dr Mendoza (ER staff)     ECG Results              EKG 12-lead (Final result)        Collection Time Result Time QRS Duration OHS QTC Calculation    02/18/25 09:21:56 02/18/25 10:30:36 72 425                     Final result by Interface, Lab In [facility] (02/18/25 10:30:39)                   Narrative:    Test Reason : R42,    Vent. Rate :  70 BPM     Atrial Rate :  70 BPM     P-R Int : 164 ms          QRS Dur :  72 ms      QT Int : 394 ms       P-R-T Axes :  70  75  73 degrees    QTcB Int : 425 ms    Normal sinus rhythm  Normal ECG  When compared with ECG of 06-Nov-2024 12:05,  T wave amplitude has increased in Anterior leads  Confirmed by Enoc Gomez ([id number]) on 2/18/2025 10:30:33 AM    Referred By: ASHLYERRAL SELF           Confirmed By: Enoc Gomez                                  Imaging Results              CT Head Without Contrast (Final result)  Result time 02/18/25 10:32:17      Final result by Lio Loredo MD (02/18/25 10:32:17)                   Impression:      Chronic age-related changes.  No acute process      Electronically signed by: Rj Loredo  Date:    02/18/2025  Time:    10:32               Narrative:    EXAMINATION:  CT HEAD WITHOUT CONTRAST    CLINICAL  HISTORY:  dizziness;    TECHNIQUE:  Multiple axial images were obtained from the base of the brain to the vertex without contrast administration.  Sagittal and coronal reconstructions were performed..Automatic exposure control is utilized to reduce patient radiation exposure.    COMPARISON:  None    FINDINGS:  There is no intracranial mass or lesion seen.  No hemorrhage is seen.  No acute infarct is seen.  There is some cerebral atrophy seen.  There is some compensatory ventricular dilatation and periventricular white matter changes consistent with the patient's age.  Calvarium is intact.  The posterior fossa is unremarkable..  The visualized portions of the paranasal sinuses show no acute abnormality.                                       X-Ray Chest AP Portable (Final result)  Result time 02/18/25 10:13:09      Final result by Junior Irving MD (02/18/25 10:13:09)                   Impression:      No acute chest disease is identified.      Electronically signed by: Junior Irving  Date:    02/18/2025  Time:    10:13               Narrative:    EXAMINATION:  XR CHEST AP PORTABLE    CLINICAL HISTORY:  , Dizziness and giddiness.    FINDINGS:  No alveolar consolidation, effusion, or pneumothorax is seen.   The thoracic aorta is normal  cardiac silhouette, central pulmonary vessels and mediastinum are normal in size and are grossly unremarkable.   visualized osseous structures are grossly unremarkable.                                       Medications   sodium chloride 0.9% bolus 500 mL 500 mL (0 mLs Intravenous Stopped 2/18/25 1027)     Medical Decision Making  The patient presents with dizziness. The onset was this morning upon getting out of bed.  The symptoms are episodic. The exacerbating factor is going from sitting to standing. The relieving factor is lying or sitting down. PMHx of  HTN, HLD, prediabetes, macular degeneration, osteoporosis, left knee OA, uterine prolapse and ureteral carbuncle. Pt  followed by [facility] medicine, GYN, audiology and optho clinics. Prior episodes: occasional. Therapy today: none. Preceding symptoms: none. Associated symptoms: she reports mild numbness to small area middle lower lip.  Associated symptoms: none, denies shortness of breath, denies chest pain, denies abdominal pain, denies nausea, denies vomiting, denies dysuria, denies loss of consciousness, denies altered level of consciousness, denies fever, and denies chills. Video  used for encounter. She is here with a friend.    Re-eval at 1110: she reports she feels much better after ivf and requests to go home, workup unremarkable, she is encouraged to stay hydrated, she will f/u with her pcp. Strict return to ER precautions given.11:14 AM DISPOSITION: The patient is resting comfortably in no acute distress.  She is hemodynamically stable and is without objective evidence for acute process requiring urgent intervention or hospitalization. I provided counseling to patient with regard to condition, the treatment plan, specific conditions for return, and the importance of follow up. Detailed written and verbal instructions provided to patient and she expressed a verbal understanding of the discharge instructions and overall management plan. Reiterated the importance of medication administration and safety and advised patient to follow up with primary care provider in 3-5 days or sooner if needed.  Answered questions at this time. The patient is stable for discharge.         Amount and/or Complexity of Data Reviewed  Labs: ordered.  Radiology: ordered. Decision-making details documented in ED Course.  Discussion of management or test interpretation with external provider(s): Dr Mendoza (ER staff) examined patient       Additional MDM:   Differential Diagnosis:   At this time differential diagnosis is but not limited to dizziness, cva, arrhythmias, electrolyte imbalance, acs                ED Course as of 02/18/25 1116    Tue Feb 18, 2025   1058 CT Head Without Contrast  Impression:     Chronic age-related changes.  No acute process   [RB]   1059 X-Ray Chest AP Portable  Impression:     No acute chest disease is identified.   [RB]   1112 Given strict ED return precautions. I have spoken with the patient and/or caregivers. I have explained the patient's condition, diagnoses and treatment plan based on the information available to me at this time. I have answered the patient's and/or caregiver's questions and addressed any concerns. The patient and/or caregivers have as good an understanding of the patient's diagnosis, condition and treatment plan as can be expected at this point. The vital signs have been stable. The patient's condition is stable and appropriate for discharge from the emergency department.      The patient will pursue further outpatient evaluation with the primary care physician or other designated or consulting physician as outlined in the discharge instructions. The patient and/or caregivers are agreeable to this plan of care and follow-up instructions have been explained in detail. The patient and/or caregivers have received these instructions in written format and have expressed an understanding of the discharge instructions. The patient and/or caregivers are aware that any significant change in condition or worsening of symptoms should prompt an immediate return to this or the closest emergency department or a call to 911.      [RB]      ED Course User Index  [RB] Roel Lyn, ACNP                           Clinical Impression:  Final diagnoses:  [R42] Dizziness          ED Disposition Condition    Discharge Stable          ED Prescriptions    None       Follow-up Information       Follow up With Specialties Details Why Contact Info    Latoya Pickering, FNP Internal Medicine In 3 days  [address] [facility] [id number] [phone]      [facility] Emergency Medicine  If symptoms  worsen [address], [place] [address] [phone]                 [1]   Social History  Tobacco Use    Smoking status: Never     Passive exposure: Never    Smokeless tobacco: Never   Substance Use Topics    Alcohol use: Never    Drug use: Never        Roel Lyn ACNP  02/18/25 1116

## 2025-02-18 NOTE — PROGRESS NOTES
Assessment /Plan     For exam results, see Encounter Report.    There are no diagnoses linked to this encounter.      OCT mac  - 11/30/2023  OD: , intact foveal contour, no IRF/SRF  OS: , intact foveal contour, disruption of the OS junction subfoveally   - 07/11/2023:  OD: , intact foveal contour, no IRF/SRF  OS: , intact foveal contour, disruption of the OS junction subfoveally   - 9/30/2022:  OD:218 Good foveal contour with few drusen is os intact  OS: 202 Good foveal contour with few drusen; is os disruption at fovea  - 10/2021:  OD:213 Good foveal contour with few drusen is os intact  OS: 206 Good foveal contour with few drusen; is os disruption at fovea        Patricio 3/30/21  OD: Flat SimK 40.99, steep simK 41.89, astig 1.8D at 6  OS; Flat SimK 40.22, Steep simK 42.44, astig 2.23D at 165      Assessment/Plan:     1) Pseudophakic, OU  - most recently s/p CE/IOL OS (9/24/2020)  - Likely residual astigmatism ( Patient decided to get basic lens instead of toric lens offered )  - difficult MRx previously, so patricio obtained 3/2021. With residual astigmatism OU  - Patient with new MRx 11/30/23, 20/25 // 20/30.     2. Pseudophakia OS s/p YAG OS  3. PCO, OD  - 6/3/24: OS dropped to 20/50 from progression of PCO. Patient wishes for treatment after discussion of r/b/a. Will bring back for next available procedure day for YAG Cap OS.  - 7/5/24: Pt rescheduled YAG OS 6/26/24 to 7/24/24. Pt's vision is stable but still blurry OS  - 9/26/24: Patient never had YAG, missed appointments 2x, will bring back on procedure day for YAG, patient wants to start with OS then OD. Tentative plan for 2 months procedure day, patient says she has had trouble scheduling around work and will let us know.  - 2/12/25: Plan for YAG OS, r/b/a explained. Patient wishes to proceed. Consent signed and witnessed.  - 2/19/25: Patient here for POW1 YAH, doing well, Stop PF and Mrx given.    3. KIKI 2/2 blephritis OU and  lower eyleid laxity OU  - artificial tears 4-6 x/daily, WC, LS  - patient using TID, told to increase to 6x/day PFATs  - nighttime denise, discussed restarting gel or ointment  - 2/12/2025: Patient endorsing increased tearing, discussed restarting eye drops more frequently    4. ARMD OU, mild  - Amsler grid daily  - No smoking, good diet and exercise encouraged  - OCT mac stable with no IRF/SRF  - annual DFE    5. Blepharoptosis OU  - not interested in surgery at this time, consider ptosis field in future    RTC 4 months DFE, OCTmac

## 2025-02-19 ENCOUNTER — OFFICE VISIT (OUTPATIENT)
Dept: OPHTHALMOLOGY | Facility: CLINIC | Age: 80
End: 2025-02-19
Payer: MEDICARE

## 2025-02-19 VITALS — BODY MASS INDEX: 18.56 KG/M2 | HEIGHT: 66 IN | WEIGHT: 115.5 LBS

## 2025-02-19 DIAGNOSIS — Z98.890 POSTOPERATIVE EYE STATE: Primary | ICD-10-CM

## 2025-02-19 PROCEDURE — 99213 OFFICE O/P EST LOW 20 MIN: CPT | Mod: PBBFAC,PN | Performed by: STUDENT IN AN ORGANIZED HEALTH CARE EDUCATION/TRAINING PROGRAM

## 2025-03-26 ENCOUNTER — TELEPHONE (OUTPATIENT)
Dept: GYNECOLOGY | Facility: CLINIC | Age: 80
End: 2025-03-26
Payer: MEDICARE

## 2025-03-26 NOTE — TELEPHONE ENCOUNTER
Pt pharmacy called asking if we received the PA for the premarin cream. I asked pharmacy to fax us this request due to we don't have it in pt media. They voiced understanding.

## 2025-04-17 DIAGNOSIS — E78.2 MIXED HYPERLIPIDEMIA: Chronic | ICD-10-CM

## 2025-04-17 RX ORDER — SIMVASTATIN 10 MG/1
10 TABLET, FILM COATED ORAL NIGHTLY
Qty: 90 TABLET | Refills: 0 | Status: SHIPPED | OUTPATIENT
Start: 2025-04-17

## 2025-05-20 ENCOUNTER — RESULTS FOLLOW-UP (OUTPATIENT)
Dept: INTERNAL MEDICINE | Facility: CLINIC | Age: 80
End: 2025-05-20

## 2025-05-20 ENCOUNTER — LAB VISIT (OUTPATIENT)
Dept: LAB | Facility: HOSPITAL | Age: 80
End: 2025-05-20
Payer: MEDICARE

## 2025-05-20 DIAGNOSIS — E78.2 MIXED HYPERLIPIDEMIA: Chronic | ICD-10-CM

## 2025-05-20 DIAGNOSIS — I10 PRIMARY HYPERTENSION: Chronic | ICD-10-CM

## 2025-05-20 DIAGNOSIS — R73.03 PREDIABETES: Chronic | ICD-10-CM

## 2025-05-20 LAB
ALBUMIN SERPL-MCNC: 3.7 G/DL (ref 3.4–4.8)
ALBUMIN/GLOB SERPL: 1.2 RATIO (ref 1.1–2)
ALP SERPL-CCNC: 60 UNIT/L (ref 40–150)
ALT SERPL-CCNC: 8 UNIT/L (ref 0–55)
ANION GAP SERPL CALC-SCNC: 5 MEQ/L
AST SERPL-CCNC: 17 UNIT/L (ref 11–45)
BACTERIA #/AREA URNS AUTO: ABNORMAL /HPF
BILIRUB SERPL-MCNC: 0.9 MG/DL
BILIRUB UR QL STRIP.AUTO: NEGATIVE
BUN SERPL-MCNC: 16.8 MG/DL (ref 9.8–20.1)
CALCIUM SERPL-MCNC: 9.2 MG/DL (ref 8.4–10.2)
CHLORIDE SERPL-SCNC: 103 MMOL/L (ref 98–107)
CHOLEST SERPL-MCNC: 185 MG/DL
CHOLEST/HDLC SERPL: 3 {RATIO} (ref 0–5)
CLARITY UR: CLEAR
CO2 SERPL-SCNC: 30 MMOL/L (ref 23–31)
COLOR UR AUTO: ABNORMAL
CREAT SERPL-MCNC: 0.77 MG/DL (ref 0.55–1.02)
CREAT/UREA NIT SERPL: 22
EST. AVERAGE GLUCOSE BLD GHB EST-MCNC: 114 MG/DL
GFR SERPLBLD CREATININE-BSD FMLA CKD-EPI: >60 ML/MIN/1.73/M2
GLOBULIN SER-MCNC: 3.2 GM/DL (ref 2.4–3.5)
GLUCOSE SERPL-MCNC: 99 MG/DL (ref 82–115)
GLUCOSE UR QL STRIP: NORMAL
HBA1C MFR BLD: 5.6 %
HDLC SERPL-MCNC: 67 MG/DL (ref 35–60)
HGB UR QL STRIP: NEGATIVE
HYALINE CASTS #/AREA URNS LPF: ABNORMAL /LPF
KETONES UR QL STRIP: NEGATIVE
LDLC SERPL CALC-MCNC: 103 MG/DL (ref 50–140)
LEUKOCYTE ESTERASE UR QL STRIP: 500
NITRITE UR QL STRIP: NEGATIVE
PH UR STRIP: 6.5 [PH]
POTASSIUM SERPL-SCNC: 3.7 MMOL/L (ref 3.5–5.1)
PROT SERPL-MCNC: 6.9 GM/DL (ref 5.8–7.6)
PROT UR QL STRIP: NEGATIVE
RBC #/AREA URNS AUTO: ABNORMAL /HPF
SODIUM SERPL-SCNC: 138 MMOL/L (ref 136–145)
SP GR UR STRIP.AUTO: 1.01 (ref 1–1.03)
SQUAMOUS #/AREA URNS LPF: ABNORMAL /HPF
TRIGL SERPL-MCNC: 77 MG/DL (ref 37–140)
UROBILINOGEN UR STRIP-ACNC: NORMAL
VLDLC SERPL CALC-MCNC: 15 MG/DL
WBC #/AREA URNS AUTO: ABNORMAL /HPF

## 2025-05-20 PROCEDURE — 81001 URINALYSIS AUTO W/SCOPE: CPT

## 2025-05-20 PROCEDURE — 80053 COMPREHEN METABOLIC PANEL: CPT

## 2025-05-20 PROCEDURE — 83036 HEMOGLOBIN GLYCOSYLATED A1C: CPT

## 2025-05-20 PROCEDURE — 80061 LIPID PANEL: CPT

## 2025-05-20 PROCEDURE — 36415 COLL VENOUS BLD VENIPUNCTURE: CPT

## 2025-07-16 DIAGNOSIS — E78.2 MIXED HYPERLIPIDEMIA: Chronic | ICD-10-CM

## 2025-07-16 RX ORDER — SIMVASTATIN 10 MG/1
10 TABLET, FILM COATED ORAL NIGHTLY
Qty: 30 TABLET | Refills: 3 | Status: SHIPPED | OUTPATIENT
Start: 2025-07-16 | End: 2025-11-13

## 2025-07-17 DIAGNOSIS — M81.0 AGE-RELATED OSTEOPOROSIS WITHOUT CURRENT PATHOLOGICAL FRACTURE: Chronic | ICD-10-CM

## 2025-07-17 RX ORDER — ALENDRONATE SODIUM 70 MG/1
70 TABLET ORAL
Qty: 12 TABLET | Refills: 2 | Status: SHIPPED | OUTPATIENT
Start: 2025-07-17

## 2025-07-30 ENCOUNTER — OFFICE VISIT (OUTPATIENT)
Dept: CARDIOLOGY | Facility: CLINIC | Age: 80
End: 2025-07-30
Payer: MEDICARE

## 2025-07-30 VITALS
WEIGHT: 118.63 LBS | DIASTOLIC BLOOD PRESSURE: 58 MMHG | OXYGEN SATURATION: 96 % | RESPIRATION RATE: 18 BRPM | TEMPERATURE: 98 F | SYSTOLIC BLOOD PRESSURE: 126 MMHG | BODY MASS INDEX: 22.4 KG/M2 | HEIGHT: 61 IN | HEART RATE: 67 BPM

## 2025-07-30 DIAGNOSIS — R73.03 PREDIABETES: Chronic | ICD-10-CM

## 2025-07-30 DIAGNOSIS — I70.90 ATHEROSCLEROSIS: Chronic | ICD-10-CM

## 2025-07-30 DIAGNOSIS — E78.2 MIXED HYPERLIPIDEMIA: Chronic | ICD-10-CM

## 2025-07-30 DIAGNOSIS — I10 PRIMARY HYPERTENSION: Primary | Chronic | ICD-10-CM

## 2025-07-30 PROCEDURE — 99215 OFFICE O/P EST HI 40 MIN: CPT | Mod: PBBFAC

## 2025-07-30 PROCEDURE — 99214 OFFICE O/P EST MOD 30 MIN: CPT | Mod: S$PBB,,,

## 2025-07-30 NOTE — PATIENT INSTRUCTIONS
Follow up in cardiology clinic in 9 months or sooner if needed   Follow up with PCP as directed  Please notify clinic if any new concerns or any change in symptoms

## 2025-07-30 NOTE — PROGRESS NOTES
CHIEF COMPLAINT:   Chief Complaint   Patient presents with    Follow-up     9 mos f/u denies cardiac targets                                                  HPI:  Kika Farias 79 y.o. female with a past medical history of hyperlipidemia, hypertension, GERD, prediabetes, insomnia, osteoarthritis, macular degeneration presents to Cardiology Clinic today for follow up and ongoing care.  She was referred here per PCP for incidental finding of atherosclerosis on CT scan.  There was evidence of atherosclerosis with in the aortic area with no evidence of any aortic aneurysm.  At initial visit, patient stated that she felt well from a cardiac standpoint, but stress test was ordered for further evaluation given the evidence of atherosclerosis on the CT scan.  Nuclear stress test revealed a normal myocardial perfusion scan, there was no evidence of myocardial ischemia or infarction.  See full report below.    Today the patient states that she feels very well overall.  She denies any cardiac complaints today such as chest pain, SOB, UGARTE, palpitations, PND, orthopnea, lower extremity edema, claudication symptoms, near-syncope, or syncope.  She has occasional lightheadedness or dizziness.  She is able to complete her ADLs without any issues or ischemic symptoms.  She states that she is very active in her house throughout the day.  She states that she walks for exercise.  She follows a heart healthy diet.  She reports compliance with all her current medications and she is tolerating them well.  She denies any tobacco or other illicit drug use.                                                                                                                                                                 CARDIAC TESTING:  Nuclear Stress Test 8.26.24    Normal myocardial perfusion scan. There is no evidence of myocardial ischemia or infarction.    The gated perfusion images showed an ejection fraction of 84% at rest. The gated  perfusion images showed an ejection fraction of 94% post stress.    The patient reported no chest pain during the stress test.    There were no arrhythmias during stress.    The patient exercised for 6 minutes 1 seconds on a Evert protocol, corresponding to a functional capacity of 7METS, achieving a peak heart rate of 123 bpm, which is 91% of the age predicted maximum heart rate.  The nuclear stress test is  good quality.    On the nuclear stress test the EF is supranormal.  If the patient has an echo, the EF on the echo is considered more accurate.     The patient has a low risk nuclear stress test   Nuclear stress test indicates no ischemia.       Patient Active Problem List   Diagnosis    Early dry stage nonexudative age-related macular degeneration of both eyes    Dry eye syndrome of both eyes    Female genital prolapse    Gastroesophageal reflux disease    Hyperlipidemia    Hypertension    Osteoarthritis of left knee    Osteoporosis    Prediabetes    Primary osteoarthritis of right knee    Other insomnia    Weight loss    Atherosclerosis     Past Surgical History:   Procedure Laterality Date    TUBAL LIGATION       Social History     Socioeconomic History    Marital status: Single   Tobacco Use    Smoking status: Never     Passive exposure: Never    Smokeless tobacco: Never   Substance and Sexual Activity    Alcohol use: Never    Drug use: Never    Sexual activity: Yes     Partners: Male     Birth control/protection: See Surgical Hx     Social Drivers of Health     Food Insecurity: No Food Insecurity (9/21/2023)    Hunger Vital Sign     Worried About Running Out of Food in the Last Year: Never true     Ran Out of Food in the Last Year: Never true   Transportation Needs: No Transportation Needs (10/26/2022)    PRAPARE - Transportation     Lack of Transportation (Medical): No     Lack of Transportation (Non-Medical): No   Stress: No Stress Concern Present (9/21/2023)    [facility] -  "Occupational Stress Questionnaire     Feeling of Stress : Not at all   Housing Stability: Low Risk  (10/26/2022)    Housing Stability Vital Sign     Unable to Pay for Housing in the Last Year: No     Number of Places Lived in the Last Year: 1     Unstable Housing in the Last Year: No        No family history on file.  Review of patient's allergies indicates:  No Known Allergies      ROS:  Review of Systems   Constitutional: Negative.  Negative for malaise/fatigue.   HENT: Negative.     Eyes: Negative.    Respiratory: Negative.  Negative for shortness of breath.    Cardiovascular: Negative.  Negative for chest pain (Occasional), palpitations, orthopnea, claudication, leg swelling and PND.   Gastrointestinal: Negative.    Genitourinary: Negative.    Musculoskeletal: Negative.    Skin: Negative.    Neurological: Negative.  Negative for weakness.   Endo/Heme/Allergies: Negative.    Psychiatric/Behavioral: Negative.     All other systems reviewed and are negative.                                                                                                                                                                               Negative except as stated in the history of present illness. See HPI for details.    PHYSICAL EXAM:  Visit Vitals  BP (!) 126/58 (BP Location: Right arm, Patient Position: Sitting)   Pulse 67   Temp 97.7 °F (36.5 °C) (Oral)   Resp 18   Ht 5' 1" (1.549 m)   Wt 53.8 kg (118 lb 9.6 oz)   SpO2 96%   BMI 22.41 kg/m²         Physical Exam  Vitals reviewed.   Constitutional:       Appearance: Normal appearance. She is normal weight. She is not ill-appearing.   HENT:      Head: Normocephalic.      Nose: Nose normal.      Mouth/Throat:      Mouth: Mucous membranes are moist.   Eyes:      Extraocular Movements: Extraocular movements intact.   Neck:      Vascular: No carotid bruit.   Cardiovascular:      Rate and Rhythm: Normal rate and regular rhythm.      Pulses: Normal pulses.      Heart sounds: " Normal heart sounds. No murmur heard.  Pulmonary:      Effort: Pulmonary effort is normal.   Abdominal:      General: There is no distension.      Palpations: Abdomen is soft.   Musculoskeletal:         General: Normal range of motion.      Cervical back: Normal range of motion.      Right lower leg: No edema.      Left lower leg: No edema.   Skin:     General: Skin is warm.   Neurological:      General: No focal deficit present.      Mental Status: She is alert.   Psychiatric:         Mood and Affect: Mood normal.         Current Outpatient Medications   Medication Instructions    alendronate (FOSAMAX) 70 mg, Oral, Every 7 days    amLODIPine (NORVASC) 5 mg, Oral, Daily    calcium carbonate (OS-GERALDINE) 600 mg, Once    collagen/biotin/ascorbic acid (COLLAGEN 1500 PLUS C ORAL) Take by mouth.    hydroCHLOROthiazide 12.5 mg, Oral, Daily    ibuprofen (ADVIL,MOTRIN) 800 mg, Every 8 hours    losartan (COZAAR) 100 mg, Oral, Daily    omeprazole (PRILOSEC) 20 mg, Oral, Daily    prednisoLONE acetate (PRED FORTE) 1 % DrpS 1 drop, Left Eye, 4 times daily    PREMARIN 1 g, Vaginal, Daily    simvastatin (ZOCOR) 10 mg, Oral, Nightly        All medications, laboratory studies, cardiac diagnostic imaging reviewed.     Lab Results   Component Value Date    TRIG 77 05/20/2025    TRIG 66 05/08/2024    CREATININE 0.77 05/20/2025    MG 2.10 11/06/2024    K 3.7 05/20/2025        ASSESSMENT/PLAN:    Atherosclerosis  Asymptomatic  NORMAL Nuclear Stress Test  - Completely asymptomatic- see HPI   - Denies all cardiac complaints and symptoms today such as CP, SOB, UGARTE, palpitations   - Incidental finding of atherosclerosis in aorta on CT scan-there was no evidence of aortic aneurysm on that scan   - She does have several CAD risk factors including hypertension, hyperlipidemia, prediabetes  - Exercise nuclear stress test revealed an underlying sinus rhythm, no evidence of myocardial ischemia or infarction.  See full report above  - Given that  patient was completely asymptomatic, no indication for further testing at this time  - We will continue with good blood pressure and cholesterol control and overall risk factor modification   - Advised patient to notify clinic if any new concerns or any change in symptoms    HTN  - BP at goal 126/58  - Continue losartan 100 mg daily, HCTZ 12.5 mg daily, amlodipine 5 mg daily   - Counseled on the importance of following a low-sodium, heart healthy diet and exercise as tolerated    HLD  -  was made 2025  - Continue Simvastatin 10 MG daily  - Management per PCP   - Counseled on the importance of following a low-cholesterol, low-fat diet and exercise as tolerated    Prediabetes  - Management per PCP      Follow up in cardiology clinic in 9 months or sooner if needed   Follow up with PCP as directed  Please notify clinic if any new concerns or any change in symptoms

## 2025-08-11 ENCOUNTER — OFFICE VISIT (OUTPATIENT)
Dept: GYNECOLOGY | Facility: CLINIC | Age: 80
End: 2025-08-11
Payer: MEDICARE

## 2025-08-11 VITALS
WEIGHT: 119.81 LBS | HEIGHT: 61 IN | HEART RATE: 70 BPM | BODY MASS INDEX: 22.62 KG/M2 | SYSTOLIC BLOOD PRESSURE: 125 MMHG | DIASTOLIC BLOOD PRESSURE: 67 MMHG | OXYGEN SATURATION: 100 % | TEMPERATURE: 98 F

## 2025-08-11 DIAGNOSIS — Z46.89 PESSARY MAINTENANCE: ICD-10-CM

## 2025-08-11 DIAGNOSIS — N81.9 FEMALE GENITAL PROLAPSE, UNSPECIFIED TYPE: Primary | ICD-10-CM

## 2025-08-11 PROCEDURE — 99213 OFFICE O/P EST LOW 20 MIN: CPT | Mod: PBBFAC
